# Patient Record
Sex: FEMALE | Race: BLACK OR AFRICAN AMERICAN | Employment: UNEMPLOYED | URBAN - METROPOLITAN AREA
[De-identification: names, ages, dates, MRNs, and addresses within clinical notes are randomized per-mention and may not be internally consistent; named-entity substitution may affect disease eponyms.]

---

## 2018-05-31 ENCOUNTER — TELEPHONE (OUTPATIENT)
Dept: OBGYN CLINIC | Facility: CLINIC | Age: 33
End: 2018-05-31

## 2018-07-24 ENCOUNTER — TELEPHONE (OUTPATIENT)
Dept: OBGYN CLINIC | Facility: CLINIC | Age: 33
End: 2018-07-24

## 2018-07-24 ENCOUNTER — OFFICE VISIT (OUTPATIENT)
Dept: OBGYN CLINIC | Facility: CLINIC | Age: 33
End: 2018-07-24
Payer: MEDICAID

## 2018-07-24 VITALS
BODY MASS INDEX: 36.82 KG/M2 | HEIGHT: 65 IN | WEIGHT: 221 LBS | TEMPERATURE: 98 F | SYSTOLIC BLOOD PRESSURE: 120 MMHG | DIASTOLIC BLOOD PRESSURE: 80 MMHG

## 2018-07-24 DIAGNOSIS — Z12.39 ENCOUNTER FOR SCREENING BREAST EXAMINATION: ICD-10-CM

## 2018-07-24 DIAGNOSIS — Z11.3 SCREEN FOR SEXUALLY TRANSMITTED DISEASES: ICD-10-CM

## 2018-07-24 DIAGNOSIS — L76.82 INCISIONAL PAIN: ICD-10-CM

## 2018-07-24 DIAGNOSIS — N84.0 UTERINE POLYP: ICD-10-CM

## 2018-07-24 DIAGNOSIS — Z12.4 SCREENING FOR MALIGNANT NEOPLASM OF CERVIX: ICD-10-CM

## 2018-07-24 DIAGNOSIS — N92.6 IRREGULAR MENSES: ICD-10-CM

## 2018-07-24 DIAGNOSIS — Z01.419 WELL WOMAN EXAM WITH ROUTINE GYNECOLOGICAL EXAM: Primary | ICD-10-CM

## 2018-07-24 PROCEDURE — 88175 CYTOPATH C/V AUTO FLUID REDO: CPT | Performed by: NURSE PRACTITIONER

## 2018-07-24 PROCEDURE — 87491 CHLMYD TRACH DNA AMP PROBE: CPT | Performed by: NURSE PRACTITIONER

## 2018-07-24 PROCEDURE — 87624 HPV HI-RISK TYP POOLED RSLT: CPT | Performed by: NURSE PRACTITIONER

## 2018-07-24 PROCEDURE — 99385 PREV VISIT NEW AGE 18-39: CPT | Performed by: NURSE PRACTITIONER

## 2018-07-24 PROCEDURE — 87591 N.GONORRHOEAE DNA AMP PROB: CPT | Performed by: NURSE PRACTITIONER

## 2018-07-24 NOTE — TELEPHONE ENCOUNTER
Submitted prior auth for transvaginal and pelvic ultrasounds     [id number] approved [id number]  [id number] approved [id number]    Please schedule ultrasound

## 2018-07-24 NOTE — PROGRESS NOTES
HPI:   Jared Turcios is a 34year old  who presents for an annual gynecological exam.   Patient has h/o 1  and 3 c-sections. Had uterine prolapse after last c/s with repair at hospital. (states OB MD \"pushed her uterus back in her body\").  Had tub heartburn  : denies dysuria, vaginal discharge or itching,   MUSCULOSKELETAL: denies back pain, muscle or joint aches  NEUROLOGIC: denies headaches  PSYCHIATRIC: denies depression, mood is good  HEMATOLOGIC: denies history of anemia  ENDOCRINE: denies th her diet to ensure adequate calcium intake, specifically with yogurt, milk, and cheeses.   - I encouraged pt to maintain taking a daily women's vitamin.  - I encouraged monthly self breast exams; pt was told to perform these in the shower; she was instructe

## 2018-07-25 LAB
C TRACH DNA SPEC QL NAA+PROBE: NEGATIVE
N GONORRHOEA DNA SPEC QL NAA+PROBE: NEGATIVE

## 2018-07-25 NOTE — TELEPHONE ENCOUNTER
Appt. Scheduled    Future Appointments  Date Time Provider Farhan Jeanie   8/1/2018 9:30 AM [facility] Yovani Villasenor

## 2018-07-31 LAB — HPV I/H RISK 1 DNA SPEC QL NAA+PROBE: NEGATIVE

## 2020-02-23 ENCOUNTER — APPOINTMENT (OUTPATIENT)
Dept: CT IMAGING | Age: 35
End: 2020-02-23
Attending: EMERGENCY MEDICINE

## 2020-02-23 ENCOUNTER — HOSPITAL ENCOUNTER (EMERGENCY)
Age: 35
Discharge: HOME | End: 2020-02-23
Attending: EMERGENCY MEDICINE

## 2020-02-23 VITALS
RESPIRATION RATE: 16 BRPM | DIASTOLIC BLOOD PRESSURE: 59 MMHG | HEIGHT: 66 IN | BODY MASS INDEX: 38.12 KG/M2 | OXYGEN SATURATION: 100 % | SYSTOLIC BLOOD PRESSURE: 113 MMHG | TEMPERATURE: 98.2 F | WEIGHT: 237.22 LBS | HEART RATE: 87 BPM

## 2020-02-23 DIAGNOSIS — N83.202 OVARIAN CYST, LEFT: ICD-10-CM

## 2020-02-23 DIAGNOSIS — R11.2 NON-INTRACTABLE VOMITING WITH NAUSEA, UNSPECIFIED VOMITING TYPE: ICD-10-CM

## 2020-02-23 DIAGNOSIS — R10.32 LEFT LOWER QUADRANT ABDOMINAL PAIN: Primary | ICD-10-CM

## 2020-02-23 LAB
ALBUMIN SERPL-MCNC: 3.5 G/DL (ref 3.6–5.1)
ALBUMIN/GLOB SERPL: 0.8 {RATIO} (ref 1–2.4)
ALP SERPL-CCNC: 107 UNITS/L (ref 45–117)
ALT SERPL-CCNC: 17 UNITS/L
ANION GAP SERPL CALC-SCNC: 9 MMOL/L (ref 10–20)
APPEARANCE UR: ABNORMAL
APPEARANCE UR: ABNORMAL
AST SERPL-CCNC: 10 UNITS/L
BASOPHILS # BLD AUTO: 0 K/MCL (ref 0–0.3)
BASOPHILS NFR BLD AUTO: 0 %
BILIRUB SERPL-MCNC: 0.3 MG/DL (ref 0.2–1)
BILIRUB UR QL STRIP: NEGATIVE
BILIRUB UR QL STRIP: NEGATIVE
BUN SERPL-MCNC: 6 MG/DL (ref 6–20)
BUN/CREAT SERPL: 11 (ref 7–25)
CALCIUM SERPL-MCNC: 9 MG/DL (ref 8.4–10.2)
CHLORIDE SERPL-SCNC: 105 MMOL/L (ref 98–107)
CO2 SERPL-SCNC: 26 MMOL/L (ref 21–32)
COLOR UR: YELLOW
COLOR UR: YELLOW
CREAT SERPL-MCNC: 0.56 MG/DL (ref 0.51–0.95)
DIFFERENTIAL METHOD BLD: ABNORMAL
EOSINOPHIL # BLD AUTO: 0 K/MCL (ref 0.1–0.5)
EOSINOPHIL NFR SPEC: 0 %
ERYTHROCYTE [DISTWIDTH] IN BLOOD: 17.7 % (ref 11–15)
GLOBULIN SER-MCNC: 4.3 G/DL (ref 2–4)
GLUCOSE SERPL-MCNC: 104 MG/DL (ref 65–99)
GLUCOSE UR STRIP-MCNC: NEGATIVE MG/DL
GLUCOSE UR STRIP-MCNC: NEGATIVE MG/DL
HCG UR QL: NEGATIVE
HCT VFR BLD CALC: 33.9 % (ref 36–46.5)
HGB BLD-MCNC: 9.9 G/DL (ref 12–15.5)
HGB UR QL STRIP: ABNORMAL
HGB UR QL STRIP: NEGATIVE
IMM GRANULOCYTES # BLD AUTO: 0.1 K/MCL (ref 0–0.2)
IMM GRANULOCYTES NFR BLD: 1 %
KETONES UR STRIP-MCNC: NEGATIVE MG/DL
KETONES UR STRIP-MCNC: NEGATIVE MG/DL
LEUKOCYTE ESTERASE UR QL STRIP: NEGATIVE
LEUKOCYTE ESTERASE UR QL STRIP: NEGATIVE
LIPASE SERPL-CCNC: 59 UNITS/L (ref 73–393)
LYMPHOCYTES # BLD MANUAL: 2.2 K/MCL (ref 1–4.8)
LYMPHOCYTES NFR BLD MANUAL: 15 %
MCH RBC QN AUTO: 21.8 PG (ref 26–34)
MCHC RBC AUTO-ENTMCNC: 29.2 G/DL (ref 32–36.5)
MCV RBC AUTO: 74.7 FL (ref 78–100)
MONOCYTES # BLD MANUAL: 0.5 K/MCL (ref 0.3–0.9)
MONOCYTES NFR BLD MANUAL: 4 %
NEUTROPHILS # BLD: 11.3 K/MCL (ref 1.8–7.7)
NEUTROPHILS NFR BLD AUTO: 80 %
NITRITE UR QL STRIP: NEGATIVE
NITRITE UR QL STRIP: NEGATIVE
NRBC BLD MANUAL-RTO: 0 /100 WBC
PH UR STRIP: 8 UNITS (ref 5–7)
PH UR STRIP: 8.5 UNITS (ref 5–7)
PLATELET # BLD: 403 K/MCL (ref 140–450)
POTASSIUM SERPL-SCNC: 3.7 MMOL/L (ref 3.4–5.1)
PROT SERPL-MCNC: 7.8 G/DL (ref 6.4–8.2)
PROT UR STRIP-MCNC: ABNORMAL MG/DL
PROT UR STRIP-MCNC: NEGATIVE MG/DL
RBC # BLD: 4.54 MIL/MCL (ref 4–5.2)
SODIUM SERPL-SCNC: 136 MMOL/L (ref 135–145)
SP GR UR STRIP: 1.02 (ref 1–1.03)
SP GR UR STRIP: >1.03 (ref 1–1.03)
SPECIMEN SOURCE: ABNORMAL
UROBILINOGEN UR STRIP-MCNC: 0.2 MG/DL (ref 0–1)
UROBILINOGEN UR STRIP-MCNC: 1 MG/DL (ref 0–1)
WBC # BLD: 14 K/MCL (ref 4.2–11)

## 2020-02-23 PROCEDURE — 83690 ASSAY OF LIPASE: CPT

## 2020-02-23 PROCEDURE — 74177 CT ABD & PELVIS W/CONTRAST: CPT

## 2020-02-23 PROCEDURE — 10002807 HB RX 258: Performed by: EMERGENCY MEDICINE

## 2020-02-23 PROCEDURE — 96375 TX/PRO/DX INJ NEW DRUG ADDON: CPT

## 2020-02-23 PROCEDURE — 96374 THER/PROPH/DIAG INJ IV PUSH: CPT

## 2020-02-23 PROCEDURE — 85025 COMPLETE CBC W/AUTO DIFF WBC: CPT

## 2020-02-23 PROCEDURE — 87491 CHLMYD TRACH DNA AMP PROBE: CPT

## 2020-02-23 PROCEDURE — 84703 CHORIONIC GONADOTROPIN ASSAY: CPT

## 2020-02-23 PROCEDURE — 99284 EMERGENCY DEPT VISIT MOD MDM: CPT

## 2020-02-23 PROCEDURE — 10002805 HB CONTRAST AGENT: Performed by: EMERGENCY MEDICINE

## 2020-02-23 PROCEDURE — 81003 URINALYSIS AUTO W/O SCOPE: CPT

## 2020-02-23 PROCEDURE — 10002800 HB RX 250 W HCPCS: Performed by: EMERGENCY MEDICINE

## 2020-02-23 PROCEDURE — 10002803 HB RX 637: Performed by: EMERGENCY MEDICINE

## 2020-02-23 PROCEDURE — 80053 COMPREHEN METABOLIC PANEL: CPT

## 2020-02-23 RX ORDER — KETOROLAC TROMETHAMINE 10 MG/1
10 TABLET, FILM COATED ORAL EVERY 6 HOURS PRN
Qty: 15 TABLET | Refills: 0 | Status: SHIPPED | OUTPATIENT
Start: 2020-02-23

## 2020-02-23 RX ORDER — HYOSCYAMINE SULFATE 0.12 MG/1
0.12 TABLET SUBLINGUAL 3 TIMES DAILY PRN
Qty: 20 TABLET | Refills: 1 | Status: SHIPPED | OUTPATIENT
Start: 2020-02-23

## 2020-02-23 RX ORDER — ONDANSETRON 8 MG/1
8 TABLET, ORALLY DISINTEGRATING ORAL EVERY 8 HOURS PRN
Qty: 12 TABLET | Refills: 1 | Status: SHIPPED | OUTPATIENT
Start: 2020-02-23

## 2020-02-23 RX ORDER — KETOROLAC TROMETHAMINE 10 MG/1
10 TABLET, FILM COATED ORAL EVERY 6 HOURS PRN
Qty: 15 TABLET | Refills: 0 | Status: SHIPPED | OUTPATIENT
Start: 2020-02-23 | End: 2020-02-23 | Stop reason: SDUPTHER

## 2020-02-23 RX ORDER — ONDANSETRON 2 MG/ML
4 INJECTION INTRAMUSCULAR; INTRAVENOUS ONCE
Status: COMPLETED | OUTPATIENT
Start: 2020-02-23 | End: 2020-02-23

## 2020-02-23 RX ADMIN — ONDANSETRON 4 MG: 2 INJECTION INTRAMUSCULAR; INTRAVENOUS at 21:45

## 2020-02-23 RX ADMIN — MORPHINE SULFATE 4 MG: 4 INJECTION INTRAVENOUS at 21:45

## 2020-02-23 RX ADMIN — IOHEXOL 80 ML: 350 INJECTION, SOLUTION INTRAVENOUS at 20:30

## 2020-02-23 RX ADMIN — HYOSCYAMINE SULFATE 0.12 MG: 0.12 TABLET SUBLINGUAL at 22:22

## 2020-02-23 RX ADMIN — KETOROLAC TROMETHAMINE 30 MG: 30 INJECTION, SOLUTION INTRAMUSCULAR; INTRAVENOUS at 21:45

## 2020-02-23 RX ADMIN — SODIUM CHLORIDE 1000 ML: 0.9 INJECTION, SOLUTION INTRAVENOUS at 21:46

## 2020-02-23 ASSESSMENT — PAIN DESCRIPTION - PAIN TYPE: TYPE: ACUTE PAIN

## 2020-02-23 ASSESSMENT — PAIN SCALES - GENERAL
PAINLEVEL_OUTOF10: 4
PAINLEVEL_OUTOF10: 10
PAINLEVEL_OUTOF10: 10

## 2020-02-24 LAB
C TRACH RRNA SPEC QL NAA+PROBE: NEGATIVE
N GONORRHOEA RRNA SPEC QL NAA+PROBE: NEGATIVE
SPECIMEN SOURCE: NORMAL

## 2020-03-08 ENCOUNTER — HOSPITAL ENCOUNTER (EMERGENCY)
Facility: HOSPITAL | Age: 35
Discharge: HOME | End: 2020-03-08
Attending: EMERGENCY MEDICINE
Payer: MEDICAID

## 2020-03-08 VITALS
HEART RATE: 85 BPM | RESPIRATION RATE: 16 BRPM | OXYGEN SATURATION: 100 % | BODY MASS INDEX: 37 KG/M2 | TEMPERATURE: 98 F | WEIGHT: 220.44 LBS | SYSTOLIC BLOOD PRESSURE: 135 MMHG | DIASTOLIC BLOOD PRESSURE: 83 MMHG

## 2020-03-08 DIAGNOSIS — J01.00 ACUTE MAXILLARY SINUSITIS, RECURRENCE NOT SPECIFIED: Primary | ICD-10-CM

## 2020-03-08 PROCEDURE — 99283 EMERGENCY DEPT VISIT LOW MDM: CPT

## 2020-03-08 RX ORDER — AZITHROMYCIN 250 MG/1
250 TABLET, FILM COATED ORAL DAILY
Qty: 1 PACKAGE | Refills: 0 | Status: SHIPPED | OUTPATIENT
Start: 2020-03-08 | End: 2021-03-25

## 2020-03-08 RX ORDER — AZITHROMYCIN 250 MG/1
500 TABLET, FILM COATED ORAL ONCE
Status: COMPLETED | OUTPATIENT
Start: 2020-03-08 | End: 2020-03-08

## 2020-03-09 NOTE — ED PROVIDER NOTES
Patient Seen in: [facility] Emergency Department      History   Patient presents with:  Cough/URI    Stated Complaint: c/o nasal congestion, \"think it's sinus infection. \" Denies cough, no GEE    HPI    72-year-old female with a history of depressi clinical history is consistent with acute sinusitis. Patient will be discharged home with a prescription for Zithromax. Recommend follow-up with her primary care physician. MDM     Patient was screened and evaluated during this visit.    As a treat

## 2021-04-28 ENCOUNTER — HOSPITAL ENCOUNTER (EMERGENCY)
Facility: HOSPITAL | Age: 36
Discharge: HOME | End: 2021-04-28
Attending: PEDIATRICS
Payer: MEDICAID

## 2021-04-28 VITALS
RESPIRATION RATE: 18 BRPM | BODY MASS INDEX: 40.14 KG/M2 | SYSTOLIC BLOOD PRESSURE: 145 MMHG | HEIGHT: 65 IN | WEIGHT: 240.94 LBS | DIASTOLIC BLOOD PRESSURE: 88 MMHG | HEART RATE: 105 BPM | OXYGEN SATURATION: 98 % | TEMPERATURE: 98 F

## 2021-04-28 DIAGNOSIS — B34.9 VIRAL SYNDROME: Primary | ICD-10-CM

## 2021-04-28 PROCEDURE — 99283 EMERGENCY DEPT VISIT LOW MDM: CPT

## 2021-04-29 NOTE — ED PROVIDER NOTES
Patient Seen in: [facility] Emergency Department      History   Patient presents with:  Cough/URI    Stated Complaint: sinus congestion    HPI/Subjective:   HPI    82-year-old female history of of hypertension and diabetes who is here with 3-day his 04/28/21 1955 145/88   Pulse 04/28/21 1955 105   Resp 04/28/21 1955 16   Temp 04/28/21 1955 98 °F (36.7 °C)   Temp src 04/28/21 1955 Temporal   SpO2 04/28/21 1955 98 %   O2 Device 04/28/21 1952 None (Room air)       Current:/88   Pulse 105   Temp 97. General: Skin is warm. Capillary Refill: Capillary refill takes less than 2 seconds. Coloration: Skin is not pale. Findings: No erythema or rash. Neurological:      General: No focal deficit present.       Mental Status: She is alert and o emergency department. Instructed to return to emergency department or contact PCP for persistent, recurrent, or worsening symptoms.                        Disposition and Plan     Clinical Impression:  Viral syndrome  (primary encounter diagnosis)     Dispo

## 2023-02-11 ENCOUNTER — HOSPITAL ENCOUNTER (EMERGENCY)
Facility: HOSPITAL | Age: 38
Discharge: HOME | End: 2023-02-12
Attending: EMERGENCY MEDICINE
Payer: MEDICAID

## 2023-02-11 ENCOUNTER — APPOINTMENT (OUTPATIENT)
Dept: CT IMAGING | Facility: HOSPITAL | Age: 38
End: 2023-02-11
Attending: EMERGENCY MEDICINE
Payer: MEDICAID

## 2023-02-11 DIAGNOSIS — M54.16 LUMBAR RADICULOPATHY: Primary | ICD-10-CM

## 2023-02-11 LAB
ALBUMIN SERPL-MCNC: 3.4 G/DL (ref 3.4–5)
ALBUMIN/GLOB SERPL: 0.8 {RATIO} (ref 1–2)
ALP LIVER SERPL-CCNC: 111 U/L
ALT SERPL-CCNC: 21 U/L
ANION GAP SERPL CALC-SCNC: 7 MMOL/L (ref 0–18)
AST SERPL-CCNC: 13 U/L (ref 15–37)
B-HCG UR QL: NEGATIVE
BASOPHILS # BLD AUTO: 0.03 X10(3) UL (ref 0–0.2)
BASOPHILS NFR BLD AUTO: 0.3 %
BILIRUB SERPL-MCNC: 0.2 MG/DL (ref 0.1–2)
BILIRUB UR QL STRIP.AUTO: NEGATIVE
BUN BLD-MCNC: 9 MG/DL (ref 7–18)
CALCIUM BLD-MCNC: 9 MG/DL (ref 8.5–10.1)
CHLORIDE SERPL-SCNC: 107 MMOL/L (ref 98–112)
CO2 SERPL-SCNC: 27 MMOL/L (ref 21–32)
COLOR UR AUTO: YELLOW
CREAT BLD-MCNC: 0.62 MG/DL
EOSINOPHIL # BLD AUTO: 0.19 X10(3) UL (ref 0–0.7)
EOSINOPHIL NFR BLD AUTO: 2 %
ERYTHROCYTE [DISTWIDTH] IN BLOOD BY AUTOMATED COUNT: 17.7 %
GFR SERPLBLD BASED ON 1.73 SQ M-ARVRAT: 119 ML/MIN/1.73M2 (ref 60–?)
GLOBULIN PLAS-MCNC: 4.1 G/DL (ref 2.8–4.4)
GLUCOSE BLD-MCNC: 113 MG/DL (ref 70–99)
GLUCOSE UR STRIP.AUTO-MCNC: NEGATIVE MG/DL
HCT VFR BLD AUTO: 31.7 %
HGB BLD-MCNC: 9.4 G/DL
IMM GRANULOCYTES # BLD AUTO: 0.02 X10(3) UL (ref 0–1)
IMM GRANULOCYTES NFR BLD: 0.2 %
KETONES UR STRIP.AUTO-MCNC: NEGATIVE MG/DL
LEUKOCYTE ESTERASE UR QL STRIP.AUTO: NEGATIVE
LIPASE SERPL-CCNC: 134 U/L (ref 73–393)
LIPASE SERPL-CCNC: 37 U/L (ref 13–75)
LYMPHOCYTES # BLD AUTO: 3.13 X10(3) UL (ref 1–4)
LYMPHOCYTES NFR BLD AUTO: 32.6 %
MCH RBC QN AUTO: 21.4 PG (ref 26–34)
MCHC RBC AUTO-ENTMCNC: 29.7 G/DL (ref 31–37)
MCV RBC AUTO: 72.2 FL
MONOCYTES # BLD AUTO: 0.51 X10(3) UL (ref 0.1–1)
MONOCYTES NFR BLD AUTO: 5.3 %
NEUTROPHILS # BLD AUTO: 5.71 X10 (3) UL (ref 1.5–7.7)
NEUTROPHILS # BLD AUTO: 5.71 X10(3) UL (ref 1.5–7.7)
NEUTROPHILS NFR BLD AUTO: 59.6 %
NITRITE UR QL STRIP.AUTO: NEGATIVE
OSMOLALITY SERPL CALC.SUM OF ELEC: 291 MOSM/KG (ref 275–295)
PH UR STRIP.AUTO: 5 [PH] (ref 5–8)
PLATELET # BLD AUTO: 367 10(3)UL (ref 150–450)
POTASSIUM SERPL-SCNC: 3.4 MMOL/L (ref 3.5–5.1)
PROT SERPL-MCNC: 7.5 G/DL (ref 6.4–8.2)
PROT UR STRIP.AUTO-MCNC: NEGATIVE MG/DL
RBC # BLD AUTO: 4.39 X10(6)UL
RBC UR QL AUTO: NEGATIVE
SARS-COV-2 RNA RESP QL NAA+PROBE: NOT DETECTED
SODIUM SERPL-SCNC: 141 MMOL/L (ref 136–145)
SP GR UR STRIP.AUTO: 1.02 (ref 1–1.03)
UROBILINOGEN UR STRIP.AUTO-MCNC: <2 MG/DL
WBC # BLD AUTO: 9.6 X10(3) UL (ref 4–11)

## 2023-02-11 PROCEDURE — 81025 URINE PREGNANCY TEST: CPT

## 2023-02-11 PROCEDURE — 99285 EMERGENCY DEPT VISIT HI MDM: CPT

## 2023-02-11 PROCEDURE — 96375 TX/PRO/DX INJ NEW DRUG ADDON: CPT

## 2023-02-11 PROCEDURE — 96361 HYDRATE IV INFUSION ADD-ON: CPT

## 2023-02-11 PROCEDURE — 81001 URINALYSIS AUTO W/SCOPE: CPT | Performed by: EMERGENCY MEDICINE

## 2023-02-11 PROCEDURE — 85025 COMPLETE CBC W/AUTO DIFF WBC: CPT | Performed by: EMERGENCY MEDICINE

## 2023-02-11 PROCEDURE — 96374 THER/PROPH/DIAG INJ IV PUSH: CPT

## 2023-02-11 PROCEDURE — 80053 COMPREHEN METABOLIC PANEL: CPT | Performed by: EMERGENCY MEDICINE

## 2023-02-11 PROCEDURE — 74177 CT ABD & PELVIS W/CONTRAST: CPT | Performed by: EMERGENCY MEDICINE

## 2023-02-11 PROCEDURE — 83690 ASSAY OF LIPASE: CPT | Performed by: EMERGENCY MEDICINE

## 2023-02-11 RX ORDER — METHYLPREDNISOLONE 4 MG/1
TABLET ORAL
Qty: 1 EACH | Refills: 0 | Status: SHIPPED | OUTPATIENT
Start: 2023-02-11 | End: 2023-02-11

## 2023-02-11 RX ORDER — OMEPRAZOLE 40 MG/1
40 CAPSULE, DELAYED RELEASE ORAL DAILY
Qty: 30 CAPSULE | Refills: 0 | Status: SHIPPED | OUTPATIENT
Start: 2023-02-11 | End: 2023-03-13

## 2023-02-11 RX ORDER — HYDROCODONE BITARTRATE AND ACETAMINOPHEN 5; 325 MG/1; MG/1
1 TABLET ORAL EVERY 6 HOURS PRN
Qty: 10 TABLET | Refills: 0 | Status: SHIPPED | OUTPATIENT
Start: 2023-02-11 | End: 2023-02-11

## 2023-02-11 RX ORDER — KETOROLAC TROMETHAMINE 15 MG/ML
15 INJECTION, SOLUTION INTRAMUSCULAR; INTRAVENOUS ONCE
Status: COMPLETED | OUTPATIENT
Start: 2023-02-11 | End: 2023-02-11

## 2023-02-11 RX ORDER — MORPHINE SULFATE 4 MG/ML
4 INJECTION, SOLUTION INTRAMUSCULAR; INTRAVENOUS ONCE
Status: COMPLETED | OUTPATIENT
Start: 2023-02-11 | End: 2023-02-11

## 2023-02-11 RX ORDER — HYDROCODONE BITARTRATE AND ACETAMINOPHEN 5; 325 MG/1; MG/1
1-2 TABLET ORAL EVERY 6 HOURS PRN
Qty: 10 TABLET | Refills: 0 | Status: SHIPPED | OUTPATIENT
Start: 2023-02-11 | End: 2023-02-16

## 2023-02-11 RX ORDER — ONDANSETRON 2 MG/ML
4 INJECTION INTRAMUSCULAR; INTRAVENOUS ONCE
Status: COMPLETED | OUTPATIENT
Start: 2023-02-11 | End: 2023-02-11

## 2023-02-12 VITALS
TEMPERATURE: 98 F | DIASTOLIC BLOOD PRESSURE: 96 MMHG | RESPIRATION RATE: 18 BRPM | BODY MASS INDEX: 38.32 KG/M2 | HEART RATE: 85 BPM | HEIGHT: 65 IN | OXYGEN SATURATION: 99 % | WEIGHT: 230 LBS | SYSTOLIC BLOOD PRESSURE: 154 MMHG

## 2023-02-12 NOTE — ED INITIAL ASSESSMENT (HPI)
Here for eval of abd pain that radiates from umbilicus to right flank. Denies urinary problems.  + nausea

## 2024-05-05 ENCOUNTER — HOSPITAL ENCOUNTER (EMERGENCY)
Facility: HOSPITAL | Age: 39
Discharge: HOME | End: 2024-05-05
Attending: EMERGENCY MEDICINE

## 2024-05-05 VITALS
BODY MASS INDEX: 35.32 KG/M2 | TEMPERATURE: 98 F | DIASTOLIC BLOOD PRESSURE: 83 MMHG | HEART RATE: 93 BPM | HEIGHT: 65 IN | SYSTOLIC BLOOD PRESSURE: 153 MMHG | RESPIRATION RATE: 18 BRPM | WEIGHT: 212 LBS | OXYGEN SATURATION: 99 %

## 2024-05-05 DIAGNOSIS — J98.01 BRONCHOSPASM: Primary | ICD-10-CM

## 2024-05-05 DIAGNOSIS — H66.002 LEFT ACUTE SUPPURATIVE OTITIS MEDIA: ICD-10-CM

## 2024-05-05 PROCEDURE — 94640 AIRWAY INHALATION TREATMENT: CPT

## 2024-05-05 PROCEDURE — 94799 UNLISTED PULMONARY SVC/PX: CPT

## 2024-05-05 PROCEDURE — 99284 EMERGENCY DEPT VISIT MOD MDM: CPT

## 2024-05-05 RX ORDER — CLARITHROMYCIN 500 MG/1
500 TABLET, COATED ORAL 2 TIMES DAILY
Qty: 20 TABLET | Refills: 0 | Status: SHIPPED | OUTPATIENT
Start: 2024-05-05 | End: 2024-05-15

## 2024-05-05 RX ORDER — IPRATROPIUM BROMIDE AND ALBUTEROL SULFATE 2.5; .5 MG/3ML; MG/3ML
3 SOLUTION RESPIRATORY (INHALATION) ONCE
Status: COMPLETED | OUTPATIENT
Start: 2024-05-05 | End: 2024-05-05

## 2024-05-05 RX ORDER — PREDNISONE 20 MG/1
20 TABLET ORAL 2 TIMES DAILY
Qty: 10 TABLET | Refills: 0 | Status: SHIPPED | OUTPATIENT
Start: 2024-05-05 | End: 2024-05-10

## 2024-05-05 RX ORDER — ALBUTEROL SULFATE 90 UG/1
2 AEROSOL, METERED RESPIRATORY (INHALATION) EVERY 4 HOURS PRN
Qty: 1 EACH | Refills: 0 | Status: SHIPPED | OUTPATIENT
Start: 2024-05-05 | End: 2024-06-04

## 2024-05-05 NOTE — DISCHARGE INSTRUCTIONS
Biaxin antibiotic.  This medication has a side effect of giving persons a metallic taste in the mouth.  This is not dangerous  Albuterol inhaler with spacer-2 puffs around-the-clock every 4-6 hours  Short course of steroid  You may continue over-the-counter cold and cough medications  Contact your primary care doctor or family doctor first thing in the morning to arrange follow-up this week  Rest and drink plenty of liquids

## 2024-05-05 NOTE — ED INITIAL ASSESSMENT (HPI)
Pt presents to ER cough, chest congestion, bilateral ear pain, weakness, no fever, yes diarrhea. Pt states symptoms not improving after 20 days. Pt is speaking clearly. Respirations equal and nonlabored. Pt is a&ox3.

## 2024-05-05 NOTE — ED PROVIDER NOTES
Patient Seen in: [facility] Emergency Department      History     Chief Complaint   Patient presents with    Cough/URI     Stated Complaint: cold x 20 days, has not been evaluated yet    Subjective:   HPI    Patient complains of cough, congestion, ear pain, and feeling generally weak.  Symptoms been going on now for more than 2 weeks.  She has not seen a doctor.  She is a non-smoker.  She states that she does have a history of wheezing when she gets sick.  She was taking Mucinex without relief of her symptoms.  She has an albuterol inhaler at home that she was using occasionally without spacer chamber.  No calf pain or swelling.  Yesterday, patient had a little discomfort in the ulnar aspect of the proximal forearm.  It is gone today.        Objective:   Past Medical History:    Anemia    Anxiety and depression    Essential hypertension    Gestational diabetes (HCC)    diet    Prediabetes    A1c 6.2    Sickle cell trait (HCC)              Past Surgical History:   Procedure Laterality Date    Anesth, section      x 3                Social History     Socioeconomic History    Marital status:    Tobacco Use    Smoking status: Some Days     Types: Cigarettes    Smokeless tobacco: Never    Tobacco comments:     3-4 cigs   Vaping Use    Vaping status: Never Used   Substance and Sexual Activity    Alcohol use: No    Drug use: No    Sexual activity: Yes     Partners: Male     Birth control/protection: Tubal Ligation   Other Topics Concern    Caffeine Concern No    Exercise Yes    Seat Belt Yes   Social History Narrative    Diet:  Healthy diet    Caffeine: 4 -5 coffee and tea    Exercise: occas              Review of Systems    Positive for stated complaint: cold x 20 days, has not been evaluated yet  Other systems are as noted in HPI.  Constitutional and vital signs reviewed.      All other systems reviewed and negative except as noted above.    Physical Exam     ED Triage Vitals [24 1806]   BP  153/83   Pulse 93   Resp 18   Temp 97.9 °F (36.6 °C)   Temp src Oral   SpO2 99 %   O2 Device None (Room air)       Current:/83   Pulse 93   Temp 97.9 °F (36.6 °C) (Oral)   Resp 18   Ht 165.1 cm (5' 5\")   Wt 96.2 kg   LMP 04/27/2024 (Exact Date)   SpO2 99%   BMI 35.28 kg/m²         Physical Exam  General: The patient is awake, alert, conversant.  She speaks with a nasal tone to voice.  She has an occasional wheezy sounding cough  Eyes: sclera white, conjunctiva pink and moist.  Lids and lashes are normal.  Throat: Posterior pharynx is mildly erythematous without exudate oromucosa lips are moist  Ears: Both TMs are dull with a streak of erythema noted on the left side.  Nose: Congested without purulent secretions or overlying sinus erythema  Lungs: There is prolonged expiration and wheeze noted.  No rhonchi  Heart: Normal S1 and S2, without murmur.    Extremities: Unremarkable.  Calves nonswollen, symmetric  Skin: Not particularly pale  Neurologic:  Mental status as above.  Patient moves all extremities with good strength and coordination.           ED Course   Labs Reviewed - No data to display                  MDM        Patient with cough and congestion now ongoing for more than 2 weeks.  Likely what started as a viral upper respiratory infection make conditions available for bacterial bronchitis or even early pneumonia.  She has definite bronchospasm on exam  Patient treated with DuoNeb.  She is given MDI and spacer chamber training.    I recommend:  Biaxin antibiotic.  This medication has a side effect of giving persons a metallic taste in the mouth.  This is not dangerous  Albuterol inhaler with spacer-2 puffs around-the-clock every 4-6 hours  Short course of steroid  You may continue over-the-counter cold and cough medications  Contact your primary care doctor or family doctor first thing in the morning to arrange follow-up this week  Rest and drink plenty of liquids                                     Medical Decision Making      Disposition and Plan     Clinical Impression:  1. Bronchospasm    2. Left acute suppurative otitis media         Disposition:  Discharge  5/5/2024  6:54 pm    Follow-up:  Leana Gardner MD  [address] [place] [address]  [address]  [facility] [address]  [phone]    Call in 1 day(s)            Medications Prescribed:  Current Discharge Medication List        START taking these medications    Details   clarithromycin 500 MG Oral Tab Take 1 tablet (500 mg total) by mouth 2 (two) times daily for 10 days.  Qty: 20 tablet, Refills: 0      albuterol 108 (90 Base) MCG/ACT Inhalation Aero Soln Inhale 2 puffs into the lungs every 4 (four) hours as needed for Wheezing.  Qty: 1 each, Refills: 0      predniSONE 20 MG Oral Tab Take 1 tablet (20 mg total) by mouth 2 (two) times daily for 5 days.  Qty: 10 tablet, Refills: 0

## 2024-08-09 ENCOUNTER — HOSPITAL ENCOUNTER (INPATIENT)
Facility: HOSPITAL | Age: 39
End: 2024-08-09
Attending: EMERGENCY MEDICINE | Admitting: HOSPITALIST
Payer: MEDICAID

## 2024-08-09 ENCOUNTER — HOSPITAL ENCOUNTER (INPATIENT)
Facility: HOSPITAL | Age: 39
LOS: 13 days | Discharge: HOME HEALTH | End: 2024-08-23
Attending: EMERGENCY MEDICINE | Admitting: HOSPITALIST
Payer: MEDICAID

## 2024-08-09 ENCOUNTER — APPOINTMENT (OUTPATIENT)
Dept: CT IMAGING | Facility: HOSPITAL | Age: 39
End: 2024-08-09
Attending: EMERGENCY MEDICINE
Payer: MEDICAID

## 2024-08-09 DIAGNOSIS — R51.9 ACUTE INTRACTABLE HEADACHE, UNSPECIFIED HEADACHE TYPE: Primary | ICD-10-CM

## 2024-08-09 LAB
ALBUMIN SERPL-MCNC: 4.8 G/DL (ref 3.2–4.8)
ALBUMIN/GLOB SERPL: 1.8 {RATIO} (ref 1–2)
ALP LIVER SERPL-CCNC: 111 U/L
ALT SERPL-CCNC: 14 U/L
ANION GAP SERPL CALC-SCNC: 6 MMOL/L (ref 0–18)
AST SERPL-CCNC: 13 U/L (ref ?–34)
BASOPHILS # BLD AUTO: 0.03 X10(3) UL (ref 0–0.2)
BASOPHILS NFR BLD AUTO: 0.2 %
BILIRUB SERPL-MCNC: 0.3 MG/DL (ref 0.3–1.2)
BUN BLD-MCNC: 9 MG/DL (ref 9–23)
CALCIUM BLD-MCNC: 9.5 MG/DL (ref 8.7–10.4)
CHLORIDE SERPL-SCNC: 109 MMOL/L (ref 98–112)
CO2 SERPL-SCNC: 22 MMOL/L (ref 21–32)
CREAT BLD-MCNC: 0.61 MG/DL
EGFRCR SERPLBLD CKD-EPI 2021: 118 ML/MIN/1.73M2 (ref 60–?)
EOSINOPHIL # BLD AUTO: 0.11 X10(3) UL (ref 0–0.7)
EOSINOPHIL NFR BLD AUTO: 0.8 %
ERYTHROCYTE [DISTWIDTH] IN BLOOD BY AUTOMATED COUNT: 18.2 %
GLOBULIN PLAS-MCNC: 2.7 G/DL (ref 2–3.5)
GLUCOSE BLD-MCNC: 163 MG/DL (ref 70–99)
HCG SERPL QL: NEGATIVE
HCT VFR BLD AUTO: 32.4 %
HGB BLD-MCNC: 9.7 G/DL
IMM GRANULOCYTES # BLD AUTO: 0.05 X10(3) UL (ref 0–1)
IMM GRANULOCYTES NFR BLD: 0.4 %
LYMPHOCYTES # BLD AUTO: 2.57 X10(3) UL (ref 1–4)
LYMPHOCYTES NFR BLD AUTO: 19.7 %
MCH RBC QN AUTO: 21.8 PG (ref 26–34)
MCHC RBC AUTO-ENTMCNC: 29.9 G/DL (ref 31–37)
MCV RBC AUTO: 72.8 FL
MONOCYTES # BLD AUTO: 0.58 X10(3) UL (ref 0.1–1)
MONOCYTES NFR BLD AUTO: 4.4 %
NEUTROPHILS # BLD AUTO: 9.71 X10 (3) UL (ref 1.5–7.7)
NEUTROPHILS # BLD AUTO: 9.71 X10(3) UL (ref 1.5–7.7)
NEUTROPHILS NFR BLD AUTO: 74.5 %
OSMOLALITY SERPL CALC.SUM OF ELEC: 286 MOSM/KG (ref 275–295)
PLATELET # BLD AUTO: 357 10(3)UL (ref 150–450)
POTASSIUM SERPL-SCNC: 3.2 MMOL/L (ref 3.5–5.1)
PROT SERPL-MCNC: 7.5 G/DL (ref 5.7–8.2)
RBC # BLD AUTO: 4.45 X10(6)UL
SARS-COV-2 RNA RESP QL NAA+PROBE: NOT DETECTED
SODIUM SERPL-SCNC: 137 MMOL/L (ref 136–145)
WBC # BLD AUTO: 13.1 X10(3) UL (ref 4–11)

## 2024-08-09 PROCEDURE — 70498 CT ANGIOGRAPHY NECK: CPT | Performed by: EMERGENCY MEDICINE

## 2024-08-09 PROCEDURE — 70496 CT ANGIOGRAPHY HEAD: CPT | Performed by: EMERGENCY MEDICINE

## 2024-08-09 RX ORDER — DIPHENHYDRAMINE HYDROCHLORIDE 50 MG/ML
25 INJECTION INTRAMUSCULAR; INTRAVENOUS ONCE
Status: COMPLETED | OUTPATIENT
Start: 2024-08-09 | End: 2024-08-09

## 2024-08-09 RX ORDER — MORPHINE SULFATE 4 MG/ML
4 INJECTION, SOLUTION INTRAMUSCULAR; INTRAVENOUS ONCE
Status: COMPLETED | OUTPATIENT
Start: 2024-08-09 | End: 2024-08-10

## 2024-08-09 RX ORDER — METOCLOPRAMIDE HYDROCHLORIDE 5 MG/ML
5 INJECTION INTRAMUSCULAR; INTRAVENOUS ONCE
Status: COMPLETED | OUTPATIENT
Start: 2024-08-09 | End: 2024-08-09

## 2024-08-10 ENCOUNTER — APPOINTMENT (OUTPATIENT)
Dept: INTERVENTIONAL RADIOLOGY/VASCULAR | Facility: HOSPITAL | Age: 39
End: 2024-08-10
Payer: MEDICAID

## 2024-08-10 ENCOUNTER — APPOINTMENT (OUTPATIENT)
Dept: CV DIAGNOSTICS | Facility: HOSPITAL | Age: 39
End: 2024-08-10
Attending: INTERNAL MEDICINE
Payer: MEDICAID

## 2024-08-10 ENCOUNTER — APPOINTMENT (OUTPATIENT)
Dept: MRI IMAGING | Facility: HOSPITAL | Age: 39
End: 2024-08-10
Attending: EMERGENCY MEDICINE
Payer: MEDICAID

## 2024-08-10 ENCOUNTER — APPOINTMENT (OUTPATIENT)
Facility: HOSPITAL | Age: 39
End: 2024-08-10
Attending: HOSPITALIST
Payer: MEDICAID

## 2024-08-10 ENCOUNTER — APPOINTMENT (OUTPATIENT)
Dept: CT IMAGING | Facility: HOSPITAL | Age: 39
End: 2024-08-10
Payer: MEDICAID

## 2024-08-10 PROBLEM — R51.9 ACUTE INTRACTABLE HEADACHE, UNSPECIFIED HEADACHE TYPE: Status: ACTIVE | Noted: 2024-08-10

## 2024-08-10 LAB
APTT PPP: 25.3 SECONDS (ref 23–36)
ATRIAL RATE: 93 BPM
BASE EXCESS BLD CALC-SCNC: -4 MMOL/L
BASOPHILS NFR CSF: 0 %
CA-I BLD-SCNC: 1.11 MMOL/L (ref 1.12–1.32)
CO2 BLD-SCNC: 21 MMOL/L (ref 22–32)
COUNT PERFORMED ON TUBE: 4
EOSINOPHIL NFR CSF: 0 %
EST. AVERAGE GLUCOSE BLD GHB EST-MCNC: 131 MG/DL (ref 68–126)
GLUCOSE BLD-MCNC: 116 MG/DL (ref 70–99)
GLUCOSE BLD-MCNC: 144 MG/DL (ref 70–99)
GLUCOSE BLD-MCNC: 88 MG/DL (ref 70–99)
GLUCOSE CSF-MCNC: 59 MG/DL (ref 40–70)
HBA1C MFR BLD: 6.2 % (ref ?–5.7)
HCG UR QL: NEGATIVE
HCO3 BLD-SCNC: 20.4 MEQ/L
HCT VFR BLD CALC: 26 %
INR BLD: 1.06 (ref 0.8–1.2)
ISTAT ACTIVATED CLOTTING TIME: 128 SECONDS (ref 74–137)
ISTAT ACTIVATED CLOTTING TIME: 177 SECONDS (ref 74–137)
ISTAT ACTIVATED CLOTTING TIME: 183 SECONDS (ref 74–137)
ISTAT ACTIVATED CLOTTING TIME: 207 SECONDS (ref 74–137)
ISTAT ACTIVATED CLOTTING TIME: 214 SECONDS (ref 74–137)
LYMPHOCYTES NFR CSF: 7 %
MONOS+MACROS NFR CSF: 9 %
MRSA DNA SPEC QL NAA+PROBE: NEGATIVE
NEUTROPHILS NFR CSF: 84 %
P AXIS: 46 DEGREES
P-R INTERVAL: 144 MS
PCO2 BLD: 32.9 MMHG
PH BLD: 7.4 [PH]
PO2 BLD: 196 MMHG
POTASSIUM BLD-SCNC: 2.9 MMOL/L (ref 3.6–5.1)
PROT PATTERN CSF ELPH-IMP: 365.8 MG/DL (ref 15–45)
PROTHROMBIN TIME: 13.8 SECONDS (ref 11.6–14.8)
Q-T INTERVAL: 384 MS
QRS DURATION: 84 MS
QTC CALCULATION (BEZET): 477 MS
R AXIS: 40 DEGREES
RBC CSF TUBE 1: NORMAL /MM3
RBC CSF: ABNORMAL /MM3 (ref ?–1)
SAO2 % BLD: 100 %
SODIUM BLD-SCNC: 142 MMOL/L (ref 136–145)
T AXIS: 43 DEGREES
TOTAL CELLS COUNTED FLD: 100
TOTAL VOLUME CSF: 3.5 ML
TROPONIN I SERPL HS-MCNC: <3 NG/L
VENTRICULAR RATE: 93 BPM
WBC # CSF: 507 /MM3 (ref 0–5)

## 2024-08-10 PROCEDURE — 70450 CT HEAD/BRAIN W/O DYE: CPT

## 2024-08-10 PROCEDURE — 99292 CRITICAL CARE ADDL 30 MIN: CPT | Performed by: INTERNAL MEDICINE

## 2024-08-10 PROCEDURE — B313YZZ FLUOROSCOPY OF RIGHT COMMON CAROTID ARTERY USING OTHER CONTRAST: ICD-10-PCS

## 2024-08-10 PROCEDURE — 99291 CRITICAL CARE FIRST HOUR: CPT | Performed by: INTERNAL MEDICINE

## 2024-08-10 PROCEDURE — 93306 TTE W/DOPPLER COMPLETE: CPT | Performed by: INTERNAL MEDICINE

## 2024-08-10 PROCEDURE — 70553 MRI BRAIN STEM W/O & W/DYE: CPT | Performed by: EMERGENCY MEDICINE

## 2024-08-10 PROCEDURE — B318YZZ FLUOROSCOPY OF BILATERAL INTERNAL CAROTID ARTERIES USING OTHER CONTRAST: ICD-10-PCS

## 2024-08-10 PROCEDURE — 03VK3DZ RESTRICTION OF RIGHT INTERNAL CAROTID ARTERY WITH INTRALUMINAL DEVICE, PERCUTANEOUS APPROACH: ICD-10-PCS

## 2024-08-10 PROCEDURE — B31DYZZ FLUOROSCOPY OF RIGHT VERTEBRAL ARTERY USING OTHER CONTRAST: ICD-10-PCS

## 2024-08-10 PROCEDURE — B31RYZZ FLUOROSCOPY OF INTRACRANIAL ARTERIES USING OTHER CONTRAST: ICD-10-PCS

## 2024-08-10 PROCEDURE — 03VG3DZ RESTRICTION OF INTRACRANIAL ARTERY WITH INTRALUMINAL DEVICE, PERCUTANEOUS APPROACH: ICD-10-PCS

## 2024-08-10 PROCEDURE — B31HYZZ FLUOROSCOPY OF RIGHT UPPER EXTREMITY ARTERIES USING OTHER CONTRAST: ICD-10-PCS

## 2024-08-10 PROCEDURE — 70544 MR ANGIOGRAPHY HEAD W/O DYE: CPT | Performed by: EMERGENCY MEDICINE

## 2024-08-10 RX ORDER — LIDOCAINE HYDROCHLORIDE 10 MG/ML
INJECTION, SOLUTION INFILTRATION; PERINEURAL
Status: DISCONTINUED
Start: 2024-08-10 | End: 2024-08-10 | Stop reason: WASHOUT

## 2024-08-10 RX ORDER — ASPIRIN 325 MG
325 TABLET, DELAYED RELEASE (ENTERIC COATED) ORAL DAILY
Status: DISCONTINUED | OUTPATIENT
Start: 2024-08-10 | End: 2024-08-10

## 2024-08-10 RX ORDER — ROCURONIUM BROMIDE 10 MG/ML
INJECTION, SOLUTION INTRAVENOUS AS NEEDED
Status: DISCONTINUED | OUTPATIENT
Start: 2024-08-10 | End: 2024-08-10 | Stop reason: SURG

## 2024-08-10 RX ORDER — MORPHINE SULFATE 2 MG/ML
2 INJECTION, SOLUTION INTRAMUSCULAR; INTRAVENOUS ONCE
Status: COMPLETED | OUTPATIENT
Start: 2024-08-10 | End: 2024-08-10

## 2024-08-10 RX ORDER — VANCOMYCIN HYDROCHLORIDE
15 EVERY 24 HOURS
Status: DISCONTINUED | OUTPATIENT
Start: 2024-08-10 | End: 2024-08-10 | Stop reason: SDUPTHER

## 2024-08-10 RX ORDER — METOCLOPRAMIDE HYDROCHLORIDE 5 MG/ML
INJECTION INTRAMUSCULAR; INTRAVENOUS AS NEEDED
Status: DISCONTINUED | OUTPATIENT
Start: 2024-08-10 | End: 2024-08-10 | Stop reason: SURG

## 2024-08-10 RX ORDER — ONDANSETRON 2 MG/ML
INJECTION INTRAMUSCULAR; INTRAVENOUS AS NEEDED
Status: DISCONTINUED | OUTPATIENT
Start: 2024-08-10 | End: 2024-08-10 | Stop reason: SURG

## 2024-08-10 RX ORDER — LEVETIRACETAM 500 MG/5ML
500 INJECTION, SOLUTION, CONCENTRATE INTRAVENOUS EVERY 12 HOURS
Status: DISCONTINUED | OUTPATIENT
Start: 2024-08-10 | End: 2024-08-12

## 2024-08-10 RX ORDER — LIDOCAINE HYDROCHLORIDE 10 MG/ML
INJECTION, SOLUTION EPIDURAL; INFILTRATION; INTRACAUDAL; PERINEURAL
Status: COMPLETED
Start: 2024-08-10 | End: 2024-08-10

## 2024-08-10 RX ORDER — LORAZEPAM 2 MG/ML
0.5 INJECTION INTRAMUSCULAR ONCE
Status: COMPLETED | OUTPATIENT
Start: 2024-08-10 | End: 2024-08-10

## 2024-08-10 RX ORDER — SODIUM CHLORIDE 9 MG/ML
INJECTION, SOLUTION INTRAVENOUS CONTINUOUS
Status: DISCONTINUED | OUTPATIENT
Start: 2024-08-10 | End: 2024-08-10

## 2024-08-10 RX ORDER — HYDROMORPHONE HYDROCHLORIDE 1 MG/ML
0.2 INJECTION, SOLUTION INTRAMUSCULAR; INTRAVENOUS; SUBCUTANEOUS EVERY 2 HOUR PRN
Status: DISCONTINUED | OUTPATIENT
Start: 2024-08-10 | End: 2024-08-11

## 2024-08-10 RX ORDER — VANCOMYCIN HYDROCHLORIDE
1500 EVERY 12 HOURS
Status: DISCONTINUED | OUTPATIENT
Start: 2024-08-10 | End: 2024-08-12

## 2024-08-10 RX ORDER — GADOTERATE MEGLUMINE 376.9 MG/ML
20 INJECTION INTRAVENOUS
Status: COMPLETED | OUTPATIENT
Start: 2024-08-10 | End: 2024-08-10

## 2024-08-10 RX ORDER — HEPARIN SODIUM 5000 [USP'U]/ML
INJECTION, SOLUTION INTRAVENOUS; SUBCUTANEOUS
Status: DISCONTINUED
Start: 2024-08-10 | End: 2024-08-10 | Stop reason: WASHOUT

## 2024-08-10 RX ORDER — NALOXONE HYDROCHLORIDE 0.4 MG/ML
0.08 INJECTION, SOLUTION INTRAMUSCULAR; INTRAVENOUS; SUBCUTANEOUS AS NEEDED
Status: DISCONTINUED | OUTPATIENT
Start: 2024-08-10 | End: 2024-08-11

## 2024-08-10 RX ORDER — ESMOLOL HYDROCHLORIDE 10 MG/ML
INJECTION INTRAVENOUS AS NEEDED
Status: DISCONTINUED | OUTPATIENT
Start: 2024-08-10 | End: 2024-08-10 | Stop reason: SURG

## 2024-08-10 RX ORDER — MORPHINE SULFATE 4 MG/ML
4 INJECTION, SOLUTION INTRAMUSCULAR; INTRAVENOUS ONCE
Status: COMPLETED | OUTPATIENT
Start: 2024-08-10 | End: 2024-08-10

## 2024-08-10 RX ORDER — LABETALOL HYDROCHLORIDE 5 MG/ML
20 INJECTION, SOLUTION INTRAVENOUS ONCE
Status: COMPLETED | OUTPATIENT
Start: 2024-08-10 | End: 2024-08-10

## 2024-08-10 RX ORDER — HEPARIN SODIUM 1000 [USP'U]/ML
INJECTION, SOLUTION INTRAVENOUS; SUBCUTANEOUS AS NEEDED
Status: DISCONTINUED | OUTPATIENT
Start: 2024-08-10 | End: 2024-08-10 | Stop reason: SURG

## 2024-08-10 RX ORDER — NIMODIPINE 30 MG/1
60 CAPSULE, LIQUID FILLED ORAL
Status: DISCONTINUED | OUTPATIENT
Start: 2024-08-10 | End: 2024-08-23

## 2024-08-10 RX ORDER — ACETAMINOPHEN 325 MG/1
650 TABLET ORAL EVERY 4 HOURS PRN
Status: DISCONTINUED | OUTPATIENT
Start: 2024-08-10 | End: 2024-08-12

## 2024-08-10 RX ORDER — ONDANSETRON 2 MG/ML
4 INJECTION INTRAMUSCULAR; INTRAVENOUS EVERY 4 HOURS PRN
Status: DISCONTINUED | OUTPATIENT
Start: 2024-08-10 | End: 2024-08-10

## 2024-08-10 RX ORDER — LABETALOL HYDROCHLORIDE 5 MG/ML
10 INJECTION, SOLUTION INTRAVENOUS EVERY 10 MIN PRN
Status: DISCONTINUED | OUTPATIENT
Start: 2024-08-10 | End: 2024-08-23

## 2024-08-10 RX ORDER — CEFAZOLIN SODIUM 1 G/3ML
INJECTION, POWDER, FOR SOLUTION INTRAMUSCULAR; INTRAVENOUS AS NEEDED
Status: DISCONTINUED | OUTPATIENT
Start: 2024-08-10 | End: 2024-08-10 | Stop reason: SURG

## 2024-08-10 RX ORDER — NITROGLYCERIN 20 MG/100ML
INJECTION INTRAVENOUS
Status: COMPLETED
Start: 2024-08-10 | End: 2024-08-10

## 2024-08-10 RX ORDER — MORPHINE SULFATE 2 MG/ML
1 INJECTION, SOLUTION INTRAMUSCULAR; INTRAVENOUS EVERY 2 HOUR PRN
Status: DISCONTINUED | OUTPATIENT
Start: 2024-08-10 | End: 2024-08-14

## 2024-08-10 RX ORDER — LABETALOL HYDROCHLORIDE 5 MG/ML
INJECTION, SOLUTION INTRAVENOUS AS NEEDED
Status: DISCONTINUED | OUTPATIENT
Start: 2024-08-10 | End: 2024-08-10 | Stop reason: SURG

## 2024-08-10 RX ORDER — HEPARIN SODIUM 5000 [USP'U]/ML
INJECTION, SOLUTION INTRAVENOUS; SUBCUTANEOUS
Status: COMPLETED
Start: 2024-08-10 | End: 2024-08-10

## 2024-08-10 RX ORDER — VERAPAMIL HYDROCHLORIDE 2.5 MG/ML
INJECTION, SOLUTION INTRAVENOUS
Status: COMPLETED
Start: 2024-08-10 | End: 2024-08-10

## 2024-08-10 RX ORDER — SODIUM CHLORIDE 9 MG/ML
125 INJECTION, SOLUTION INTRAVENOUS CONTINUOUS
Status: DISCONTINUED | OUTPATIENT
Start: 2024-08-10 | End: 2024-08-10

## 2024-08-10 RX ORDER — VANCOMYCIN 2 GRAM/500 ML IN 0.9 % SODIUM CHLORIDE INTRAVENOUS
2000 ONCE
Status: COMPLETED | OUTPATIENT
Start: 2024-08-10 | End: 2024-08-10

## 2024-08-10 RX ORDER — ASPIRIN 300 MG/1
300 SUPPOSITORY RECTAL ONCE
Status: COMPLETED | OUTPATIENT
Start: 2024-08-10 | End: 2024-08-10

## 2024-08-10 RX ORDER — HEPARIN SODIUM 1000 [USP'U]/ML
INJECTION, SOLUTION INTRAVENOUS; SUBCUTANEOUS
Status: COMPLETED
Start: 2024-08-10 | End: 2024-08-10

## 2024-08-10 RX ORDER — ASPIRIN 325 MG
325 TABLET, DELAYED RELEASE (ENTERIC COATED) ORAL ONCE
Status: COMPLETED | OUTPATIENT
Start: 2024-08-10 | End: 2024-08-10

## 2024-08-10 RX ORDER — LIDOCAINE/PRILOCAINE 2.5 %-2.5%
CREAM (GRAM) TOPICAL
Status: COMPLETED
Start: 2024-08-10 | End: 2024-08-10

## 2024-08-10 RX ORDER — ASPIRIN 300 MG/1
300 SUPPOSITORY RECTAL DAILY
Status: DISCONTINUED | OUTPATIENT
Start: 2024-08-10 | End: 2024-08-10

## 2024-08-10 RX ORDER — IODIXANOL 320 MG/ML
350 INJECTION, SOLUTION INTRAVASCULAR
Status: COMPLETED | OUTPATIENT
Start: 2024-08-10 | End: 2024-08-10

## 2024-08-10 RX ORDER — ONDANSETRON 2 MG/ML
4 INJECTION INTRAMUSCULAR; INTRAVENOUS EVERY 6 HOURS PRN
Status: DISCONTINUED | OUTPATIENT
Start: 2024-08-10 | End: 2024-08-23

## 2024-08-10 RX ORDER — MIDAZOLAM HYDROCHLORIDE 1 MG/ML
2 INJECTION INTRAMUSCULAR; INTRAVENOUS
Status: DISCONTINUED | OUTPATIENT
Start: 2024-08-10 | End: 2024-08-23

## 2024-08-10 RX ORDER — ACETAMINOPHEN 10 MG/ML
1000 INJECTION, SOLUTION INTRAVENOUS EVERY 6 HOURS PRN
Status: DISCONTINUED | OUTPATIENT
Start: 2024-08-10 | End: 2024-08-12

## 2024-08-10 RX ORDER — MIDAZOLAM HYDROCHLORIDE 1 MG/ML
INJECTION INTRAMUSCULAR; INTRAVENOUS
Status: DISCONTINUED
Start: 2024-08-10 | End: 2024-08-10 | Stop reason: WASHOUT

## 2024-08-10 RX ORDER — ACETAMINOPHEN 650 MG/1
650 SUPPOSITORY RECTAL EVERY 4 HOURS PRN
Status: DISCONTINUED | OUTPATIENT
Start: 2024-08-10 | End: 2024-08-12

## 2024-08-10 RX ORDER — HYDROMORPHONE HYDROCHLORIDE 1 MG/ML
0.4 INJECTION, SOLUTION INTRAMUSCULAR; INTRAVENOUS; SUBCUTANEOUS EVERY 2 HOUR PRN
Status: DISCONTINUED | OUTPATIENT
Start: 2024-08-10 | End: 2024-08-11

## 2024-08-10 RX ORDER — HYDRALAZINE HYDROCHLORIDE 20 MG/ML
10 INJECTION INTRAMUSCULAR; INTRAVENOUS EVERY 2 HOUR PRN
Status: DISCONTINUED | OUTPATIENT
Start: 2024-08-10 | End: 2024-08-23

## 2024-08-10 RX ORDER — ASPIRIN 81 MG/1
81 TABLET, CHEWABLE ORAL DAILY
Status: DISCONTINUED | OUTPATIENT
Start: 2024-08-11 | End: 2024-08-21

## 2024-08-10 RX ORDER — METOPROLOL TARTRATE 1 MG/ML
INJECTION, SOLUTION INTRAVENOUS AS NEEDED
Status: DISCONTINUED | OUTPATIENT
Start: 2024-08-10 | End: 2024-08-10 | Stop reason: SURG

## 2024-08-10 RX ORDER — ONDANSETRON 2 MG/ML
4 INJECTION INTRAMUSCULAR; INTRAVENOUS EVERY 6 HOURS PRN
Status: DISCONTINUED | OUTPATIENT
Start: 2024-08-10 | End: 2024-08-10

## 2024-08-10 RX ORDER — SODIUM CHLORIDE, SODIUM LACTATE, POTASSIUM CHLORIDE, CALCIUM CHLORIDE 600; 310; 30; 20 MG/100ML; MG/100ML; MG/100ML; MG/100ML
INJECTION, SOLUTION INTRAVENOUS CONTINUOUS
Status: DISCONTINUED | OUTPATIENT
Start: 2024-08-10 | End: 2024-08-14

## 2024-08-10 RX ORDER — HYDROMORPHONE HYDROCHLORIDE 1 MG/ML
0.8 INJECTION, SOLUTION INTRAMUSCULAR; INTRAVENOUS; SUBCUTANEOUS EVERY 2 HOUR PRN
Status: DISCONTINUED | OUTPATIENT
Start: 2024-08-10 | End: 2024-08-11

## 2024-08-10 RX ORDER — MORPHINE SULFATE 2 MG/ML
2 INJECTION, SOLUTION INTRAMUSCULAR; INTRAVENOUS EVERY 2 HOUR PRN
Status: DISCONTINUED | OUTPATIENT
Start: 2024-08-10 | End: 2024-08-14

## 2024-08-10 RX ORDER — MORPHINE SULFATE 4 MG/ML
INJECTION, SOLUTION INTRAMUSCULAR; INTRAVENOUS
Status: DISCONTINUED
Start: 2024-08-10 | End: 2024-08-10

## 2024-08-10 RX ORDER — METOCLOPRAMIDE HYDROCHLORIDE 5 MG/ML
10 INJECTION INTRAMUSCULAR; INTRAVENOUS EVERY 8 HOURS PRN
Status: DISCONTINUED | OUTPATIENT
Start: 2024-08-10 | End: 2024-08-23

## 2024-08-10 RX ADMIN — ROCURONIUM BROMIDE 10 MG: 10 INJECTION, SOLUTION INTRAVENOUS at 20:53:00

## 2024-08-10 RX ADMIN — HEPARIN SODIUM 2000 UNITS: 1000 INJECTION, SOLUTION INTRAVENOUS; SUBCUTANEOUS at 20:14:00

## 2024-08-10 RX ADMIN — SODIUM CHLORIDE: 9 INJECTION, SOLUTION INTRAVENOUS at 21:15:00

## 2024-08-10 RX ADMIN — METOCLOPRAMIDE HYDROCHLORIDE 10 MG: 5 INJECTION INTRAMUSCULAR; INTRAVENOUS at 21:30:00

## 2024-08-10 RX ADMIN — ROCURONIUM BROMIDE 10 MG: 10 INJECTION, SOLUTION INTRAVENOUS at 19:11:00

## 2024-08-10 RX ADMIN — ROCURONIUM BROMIDE 10 MG: 10 INJECTION, SOLUTION INTRAVENOUS at 19:44:00

## 2024-08-10 RX ADMIN — HEPARIN SODIUM 2 UNITS: 1000 INJECTION, SOLUTION INTRAVENOUS; SUBCUTANEOUS at 19:40:00

## 2024-08-10 RX ADMIN — ROCURONIUM BROMIDE 40 MG: 10 INJECTION, SOLUTION INTRAVENOUS at 17:44:00

## 2024-08-10 RX ADMIN — ROCURONIUM BROMIDE 10 MG: 10 INJECTION, SOLUTION INTRAVENOUS at 20:14:00

## 2024-08-10 RX ADMIN — ROCURONIUM BROMIDE 40 MG: 10 INJECTION, SOLUTION INTRAVENOUS at 18:25:00

## 2024-08-10 RX ADMIN — ESMOLOL HYDROCHLORIDE 70 MG: 10 INJECTION INTRAVENOUS at 17:33:00

## 2024-08-10 RX ADMIN — METOPROLOL TARTRATE 2.5 MG: 1 INJECTION, SOLUTION INTRAVENOUS at 17:37:00

## 2024-08-10 RX ADMIN — ONDANSETRON 4 MG: 2 INJECTION INTRAMUSCULAR; INTRAVENOUS at 21:30:00

## 2024-08-10 RX ADMIN — ROCURONIUM BROMIDE 10 MG: 10 INJECTION, SOLUTION INTRAVENOUS at 17:33:00

## 2024-08-10 RX ADMIN — LABETALOL HYDROCHLORIDE 10 MG: 5 INJECTION, SOLUTION INTRAVENOUS at 21:19:00

## 2024-08-10 RX ADMIN — HEPARIN SODIUM 2000 UNITS: 1000 INJECTION, SOLUTION INTRAVENOUS; SUBCUTANEOUS at 20:53:00

## 2024-08-10 RX ADMIN — CEFAZOLIN SODIUM 2 G: 1 INJECTION, POWDER, FOR SOLUTION INTRAMUSCULAR; INTRAVENOUS at 17:55:00

## 2024-08-10 NOTE — ED INITIAL ASSESSMENT (HPI)
Pt arrives via emd w c/o neck and back pain. Pt reports it started 20 min before ems arrived and \"it feels like hot oil is being poured down her back\". Ems gave pt 1100 mcg of fentanyl pta. Pt denies relief w meds. Pt also reporting headache and nausea in triage

## 2024-08-10 NOTE — CONSULTS
[facility]   part of [facility] Infectious Disease Consult    Greg Uriarte Patient Status:  Inpatient    1985 MRN [id number]   Location [facility] 6NE-A Attending Cheryle Martinez MD   Hosp Day # 0 PCP Leana Gardner MD     Reason for Consultation:  To help with infection and treatment,requested by Dr. 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 Aneurysm,   Also had abn LP, Started on IV abx,   Patient groggy most of the history taken from  at the bedside,   ID is asked to help with infection and treatment,       History:  Past Medical History:    Anemia    Anxiety and depression    Essential hypertension    Gestational diabetes (HCC)    diet    Prediabetes    A1c 6.2    Sickle cell trait (HCC)     Past Surgical History:   Procedure Laterality Date    Anesth, section      x 3     Family History   Problem Relation Age of Onset    Diabetes Mother     High Blood Pressure Mother     Other (heart murmur) Mother     Other (sarcoma) Brother     Other (neurofibromatosis) Brother     Depression Brother     Anxiety Brother       reports that she has been smoking cigarettes. She has never used smokeless tobacco. She reports that she does not drink alcohol and does not use drugs.    Allergies:  Allergies   Allergen Reactions    Amoxicillin RASH    Latex HIVES       Medications:    Current Facility-Administered Medications:     sodium chloride 0.9% infusion, 125 mL/hr,  Intravenous, Continuous    acyclovir (Zovirax) 600 mg in sodium chloride 0.9% 100 mL IVPB, 10 mg/kg (Ideal), Intravenous, Q8H    sodium chloride 0.9% infusion, , Intravenous, Continuous    labetalol (Trandate) 5 mg/mL injection 10 mg, 10 mg, Intravenous, Q10 Min PRN    hydrALAzine (Apresoline) 20 mg/mL injection 10 mg, 10 mg, Intravenous, Q2H PRN    acetaminophen (Tylenol) tab 650 mg, 650 mg, Oral, Q4H PRN **OR** acetaminophen (Tylenol) rectal suppository 650 mg, 650 mg, Rectal, Q4H PRN    ondansetron (Zofran) 4 MG/2ML injection 4 mg, 4 mg, Intravenous, Q6H PRN **OR** metoclopramide (Reglan) 5 mg/mL injection 10 mg, 10 mg, Intravenous, Q8H PRN    levETIRAcetam (Keppra) 500 mg/5mL injection 500 mg, 500 mg, Intravenous, q12h    niMODipine (Nimotop) cap 60 mg, 60 mg, Oral, 6 times per day    midazolam (Versed) 2 MG/2ML injection 2 mg, 2 mg, Intravenous, Q15 Min PRN    HYDROmorphone (Dilaudid) 1 MG/ML injection 0.2 mg, 0.2 mg, Intravenous, Q2H PRN **OR** HYDROmorphone (Dilaudid) 1 MG/ML injection 0.4 mg, 0.4 mg, Intravenous, Q2H PRN **OR** HYDROmorphone (Dilaudid) 1 MG/ML injection 0.8 mg, 0.8 mg, Intravenous, Q2H PRN    acetaminophen (Ofirmev) 10 mg/mL infusion premix 1,000 mg, 1,000 mg, Intravenous, Q6H PRN    cefTRIAXone (Rocephin) 2 g in sodium chloride 0.9% 100 mL IVPB-ADDV, 2 g, Intravenous, Q24H    vancomycin (Vancocin) 1.5 g in sodium chloride 0.9% 250mL IVPB premix, 15 mg/kg, Intravenous, Q24H    Review of Systems:   Constitutional: Negative for anorexia, chills, fatigue, fevers, malaise, night sweats and weight loss.  Eyes: Negative for visual disturbance, irritation and redness.  Ears, nose, mouth, throat, and face: Negative for hearing loss, tinnitus, nasal congestion, snoring, sore throat, hoarseness and voice change.  Respiratory: Negative for cough, sputum, hemoptysis, chest pain, wheezing, dyspnea on exertion, or stridor.  Cardiovascular: Negative for chest pain, palpitations, irregular heart beats,  syncope, fatigue, orthopnea, paroxysmal nocturnal dyspnea, lower extremity edema.  Gastrointestinal: Negative for dysphagia, odynophagia, reflux symptoms, nausea, vomiting, change in bowel habits, diarrhea, constipation and abdominal pain.  Integument/breast: Negative for rash, skin lesions, and pruritus.  Hematologic/lymphatic: Negative for easy bruising, bleeding, and lymphadenopathy.  Musculoskeletal: Negative for myalgias, arthralgias, muscle weakness.  Neurological: Negative for headaches, dizziness, seizures, memory problems, trouble swallowing, speech problems, gait problems and weakness.  Behavioral/Psych: Negative for active tobacco use.  Endocrine: No history of of diabetes, thyroid disorder.  Unable to obtain,     Vital signs in last 24 hours:  Patient Vitals for the past 24 hrs:   BP Temp Temp src Pulse Resp SpO2 Weight   08/10/24 1130 146/76 -- -- 78 18 -- --   08/10/24 1100 (!) 142/91 -- -- 98 21 -- --   08/10/24 1030 140/79 -- -- 80 11 -- --   08/10/24 1015 133/81 -- -- 74 15 -- --   08/10/24 1000 135/86 -- -- 74 20 -- --   08/10/24 0945 144/82 -- -- 81 14 -- --   08/10/24 0935 150/87 98.1 °F (36.7 °C) Temporal 82 14 95 % --   08/10/24 0834 135/73 -- -- 83 16 99 % --   08/10/24 0814 126/70 -- -- 95 16 -- --   08/10/24 0804 146/76 -- -- -- -- -- --   08/10/24 0517 141/77 -- -- 106 19 100 % --   08/09/24 2355 151/85 -- -- 89 19 98 % --   08/09/24 2148 128/78 -- -- -- -- -- --   08/09/24 2054 120/75 -- -- -- -- -- --   08/09/24 2053 -- 97 °F (36.1 °C) Temporal 101 16 100 % 213 lb 13.5 oz (97 kg)       Physical Exam:   General: alert, cooperative, oriented.  No respiratory distress.   Head: Normocephalic, without obvious abnormality, atraumatic.   Eyes: Conjunctivae/corneas clear.  No scleral icterus.  No conjunctival     hemorrhage.   Nose: Nares normal.   Throat: Lips, mucosa, and tongue normal.  No thrush noted.   Neck: painful movement of neck,trachea midline, no adenopathy, no thyromegaly.   Lungs:  CTAB, normal and equal bilateral chest rise   Chest wall: No tenderness or deformity.   Heart: Regular rate and rhythm, normal S1S2, no murmur.   Abdomen: soft, non-tender, non-distended, no masses, no guarding, no     rebound, positive BS.   Extremity: no edema, no cyanosis   Skin: No rashes or lesions.   Neurological: Alert, interactive, no focal deficits    Labs:  Lab Results   Component Value Date    WBC 13.1 08/09/2024    HGB 9.7 08/09/2024    HCT 32.4 08/09/2024    .0 08/09/2024    CREATSERUM 0.61 08/09/2024    BUN 9 08/09/2024     08/09/2024    K 3.2 08/09/2024     08/09/2024    CO2 22.0 08/09/2024     08/09/2024    CA 9.5 08/09/2024    ALB 4.8 08/09/2024    ALKPHO 111 08/09/2024    BILT 0.3 08/09/2024    TP 7.5 08/09/2024    AST 13 08/09/2024    ALT 14 08/09/2024       Radiology:  Reviewed,       Cultures:  Reviewed,     Assessment and Plan:    1.  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 Will follow IR input,   - Continue IV Vanc, Ceftriaxone and Acyclovir, 8/10.   - supportive care of headache, neuro checks,     2.    Leukocytosis; sec to above,     3.   Disposition: in house an middle aged female with severe headache of a day duration with no fever, chills. Bloody LP, imaging with possible aneurysm,   - Follow IR, NS input,   - Follow Pending cul,   - Continue IV Vanc, Ceftriaxone and Acyclovir,   - Pain management per PCP,     Discussed with spouse, RN, all questions answered, further recommendations to follow, Thanks,   Thank you for consulting [facility] for Greg Uriarte.  If you have  any questions or concerns please call [facility] Infectious Disease at [phone].     Magdalene Gurrola MD  8/10/2024  12:36 PM

## 2024-08-10 NOTE — ANESTHESIA PREPROCEDURE EVALUATION
PRE-OP EVALUATION    Patient Name: Greg Uriarte    Admit Diagnosis: Acute intractable headache, unspecified headache type [R51.9]    Pre-op Diagnosis: * No pre-op diagnosis entered *        Anesthesia Procedure: IR ANGIOGRAM    * No surgeons found in log *    Pre-op vitals reviewed.  Temp: 97.8 °F (36.6 °C)  Pulse: 78  Resp: 19  BP: 139/83  SpO2: 93 %  Body mass index is 35.59 kg/m².    Current medications reviewed.  Hospital Medications:   [COMPLETED] morphINE PF 2 MG/ML injection 2 mg  2 mg Intravenous Once    [COMPLETED] gadoterate meglumine (Dotarem) 10 MMOL/20ML injection 20 mL  20 mL Intravenous ONCE PRN    sodium chloride 0.9% infusion  125 mL/hr Intravenous Continuous    [COMPLETED] morphINE PF 4 MG/ML injection 4 mg  4 mg Intravenous Once    [] ondansetron (Zofran) 4 MG/2ML injection 4 mg  4 mg Intravenous Q4H PRN    [COMPLETED] cefTRIAXone (Rocephin) 2 g in sodium chloride 0.9% 100 mL IVPB-ADDV  2 g Intravenous Once    [COMPLETED] morphINE PF 4 MG/ML injection 4 mg  4 mg Intravenous Once    [COMPLETED] vancomycin (Vancocin) 2 g in sodium chloride 0.9% 500mL IVPB premix  2,000 mg Intravenous Once    acyclovir (Zovirax) 600 mg in sodium chloride 0.9% 100 mL IVPB  10 mg/kg (Ideal) Intravenous Q8H    [COMPLETED] labetalol (Trandate) 5 mg/mL injection 20 mg  20 mg Intravenous Once    sodium chloride 0.9% infusion   Intravenous Continuous    labetalol (Trandate) 5 mg/mL injection 10 mg  10 mg Intravenous Q10 Min PRN    hydrALAzine (Apresoline) 20 mg/mL injection 10 mg  10 mg Intravenous Q2H PRN    acetaminophen (Tylenol) tab 650 mg  650 mg Oral Q4H PRN    Or    acetaminophen (Tylenol) rectal suppository 650 mg  650 mg Rectal Q4H PRN    ondansetron (Zofran) 4 MG/2ML injection 4 mg  4 mg Intravenous Q6H PRN    Or    metoclopramide (Reglan) 5 mg/mL injection 10 mg  10 mg Intravenous Q8H PRN    levETIRAcetam (Keppra) 500 mg/5mL injection 500 mg  500 mg Intravenous q12h    niMODipine (Nimotop) cap 60 mg  60  mg Oral 6 times per day    midazolam (Versed) 2 MG/2ML injection 2 mg  2 mg Intravenous Q15 Min PRN    HYDROmorphone (Dilaudid) 1 MG/ML injection 0.2 mg  0.2 mg Intravenous Q2H PRN    Or    HYDROmorphone (Dilaudid) 1 MG/ML injection 0.4 mg  0.4 mg Intravenous Q2H PRN    Or    HYDROmorphone (Dilaudid) 1 MG/ML injection 0.8 mg  0.8 mg Intravenous Q2H PRN    acetaminophen (Ofirmev) 10 mg/mL infusion premix 1,000 mg  1,000 mg Intravenous Q6H PRN    cefTRIAXone (Rocephin) 2 g in sodium chloride 0.9% 100 mL IVPB-ADDV  2 g Intravenous q12h    [COMPLETED] Perflutren Lipid Microsphere (DEFINITY) 6.52 MG/ML injection 1.5 mL  1.5 mL Intravenous ONCE PRN    Vancomycin: PHARMACY DOSING  1 each Intravenous See Admin Instructions (RX holding)    [COMPLETED] LORazepam (Ativan) 2 mg/mL injection 0.5 mg  0.5 mg Intravenous Once    vancomycin (Vancocin) 1.5 g in sodium chloride 0.9% 250mL IVPB premix  1,500 mg Intravenous Q12H    [COMPLETED] lidocaine PF (Xylocaine-MPF) 1 % injection        [COMPLETED] heparin (Porcine) 1000 UNIT/ML injection        [COMPLETED] heparin (Porcine) 5000 UNIT/ML injection        [COMPLETED] heparin (Porcine) 5000 UNIT/ML injection        [COMPLETED] lidocaine (Xylocaine) 1 % injection        [COMPLETED] fentaNYL (Sublimaze) 50 mcg/mL injection        [COMPLETED] midazolam (Versed) 2 MG/2ML injection        [COMPLETED] niCARdipine in sodium chloride 0.86% (carDENE) 20 mg/200mL infusion premix        [COMPLETED] diphenhydrAMINE (Benadryl) 50 mg/mL  injection 25 mg  25 mg Intravenous Once    [COMPLETED] metoclopramide (Reglan) 5 mg/mL injection 5 mg  5 mg Intravenous Once    [COMPLETED] iopamidol 76% (ISOVUE-370) injection for power injector  75 mL Intravenous ONCE PRN    [COMPLETED] morphINE PF 4 MG/ML injection 4 mg  4 mg Intravenous Once       Outpatient Medications:     Medications Prior to Admission   Medication Sig Dispense Refill Last Dose    [] clarithromycin 500 MG Oral Tab Take 1 tablet (500  mg total) by mouth 2 (two) times daily for 10 days. 20 tablet 0     [] albuterol 108 (90 Base) MCG/ACT Inhalation Aero Soln Inhale 2 puffs into the lungs every 4 (four) hours as needed for Wheezing. 1 each 0     tranexamic acid 650 MG Oral Tab Take 2 tablets (1,300 mg total) by mouth in the morning, at noon, and at bedtime. (Patient not taking: Reported on 2024) 90 tablet 3     sertraline 50 MG Oral Tab Take 1 tablet (50 mg total) by mouth daily. (Patient not taking: Reported on 2024) 30 tablet 2        Allergies: Amoxicillin, Latex, and Banana      Anesthesia Evaluation    Patient summary reviewed.    Anesthetic Complications  (-) history of anesthetic complications         GI/Hepatic/Renal    Negative GI/hepatic/renal ROS.                             Cardiovascular                (+) obesity  (+) hypertension                                     Endo/Other  Comment: Sickle cell trait    (+) diabetes  gestational,       (+) anemia                   Pulmonary    Negative pulmonary ROS.                       Neuro/Psych    Negative neuro/psych ROS.                          Acute intractable headache, unspecified headache type Essential hypertension  Gestational diabetes (HCC) Incisional pain  Irregular menses Prediabetes  Sickle cell trait (HCC) Well woman exam with routine gynecological exam            Procedure Laterality Date    Anesth, section      x 3     Social History     Socioeconomic History    Marital status:    Tobacco Use    Smoking status: Some Days     Types: Cigarettes    Smokeless tobacco: Never    Tobacco comments:     3-4 cigs   Vaping Use    Vaping status: Never Used   Substance and Sexual Activity    Alcohol use: No    Drug use: No    Sexual activity: Yes     Partners: Male     Birth control/protection: Tubal Ligation   Other Topics Concern    Caffeine Concern No    Exercise Yes    Seat Belt Yes     History   Drug Use No     Available pre-op labs reviewed.  Lab  Results   Component Value Date    WBC 13.1 (H) 08/09/2024    RBC 4.45 08/09/2024    HGB 9.7 (L) 08/09/2024    HCT 32.4 (L) 08/09/2024    MCV 72.8 (L) 08/09/2024    MCH 21.8 (L) 08/09/2024    MCHC 29.9 (L) 08/09/2024    RDW 18.2 08/09/2024    .0 08/09/2024     Lab Results   Component Value Date     08/09/2024    K 3.2 (L) 08/09/2024     08/09/2024    CO2 22.0 08/09/2024    BUN 9 08/09/2024    CREATSERUM 0.61 08/09/2024     (H) 08/09/2024    CA 9.5 08/09/2024            Airway      Mallampati: III  Mouth opening: 3 FB  TM distance: 4 - 6 cm  Neck ROM: full Cardiovascular    Cardiovascular exam normal.         Dental  Comment: Normal wear/minor imperfections and discoloration noted. No grossly weakened or damaged teeth on exam.           Pulmonary    Pulmonary exam normal.                 Other findings        ASA: 2 and emergent  Plan: general  NPO status verified and patient meets guidelines.        Comment: We discussed general anesthesia as the primary form of anesthesia for this procedure. General anesthesia involves the use a breathing tube to help the patient breathe and deliver anesthetic gasses. Several intravenous medications will be used to help keep the patient safe and comfortable.  Common risks with general anesthesia include nausea, vomiting, scratched eye, sore throat and dental damage. The risk of dental damage is higher for weakened or damaged teeth. Rare but serious risks can occur. Some of these serious complications include brain injury, nerve injury, blindness, and death. Risks of these serious injury are small, but never zero. I asked the patient if they had any questions about the consent form and what was written on it. The patient was given the opportunity to ask questions in the pre op area and in the operating room before going to sleep. All questions were answered.  We also discussed mily and possible post op intubation.                Present on  Admission:  **None**

## 2024-08-10 NOTE — CONSULTS
[facility]  Interventional Neuroradiology Consult Note      Date of Service: 8/10/2024     History of Present Illness: Greg Uriarte i is a 39 year old female with past medical history of active smoking, hypertension, pre-diabetes, anxiety/depression, sickle cell trait, and family history of neurofibromatosis presented to the [facility] ER in the late evening of 8/9/2024 after acute onset of severe occipital headache and pressure radiating to the vertex with associated nausea/vomiting and photophobia.  She describes her headache as atypical from her occasional migraine headaches without improvement overnight in the emergency room.  Although no definitive intracranial hemorrhage was identified on initial CT head study, there appears to be mild interval ventriculomegaly in comparison to prior CT head study in 2016.  Subsequent CTA head/neck and MRI brain imaging studies identified a 4 mm cerebral aneurysm arising from the supraclinoid right internal carotid artery terminus projecting posteriorly as well punctate foci of T1 hyperintensity or hemorrhage in the left temporal lobe, but no definitive subarachnoid hemorrhage adjacent to the aneurysm in the suprasellar cistern, as further evaluated today by MRA/MR vessel wall imaging.  Lumbar puncture was performed in the ER with CSF analysis demonstrating a compromised bloody tap with > 50,000 RBCs in the 4th tube (non-diagnostic for SAH), elevated WBCs, and markedly elevated protein.  Due to inability to entirely exclude sentinel headache or occult subarachnoid hemorrhage form ruptured cerebral aneurysm with equivocal clinical and imaging criteria, our neurointerventional surgery service was consulted for emergent endovascular treatment by the neurocritical care and neurosurgery services.    Review of Systems:  +persisting headache, partial relief with tylenol  + nausea, photophobia  The patient denies cranial nerve symptoms such as blurred vision, diplopia,  ptosis, facial weakness/paresthesias, hearing loss, vertigo, tinnitus, hoarseness, dysphagia, or alternations in taste.  The patient denies upper/lower extremity weakness or paresthesias.  The patient denies dizziness, imbalance, falls, difficulty with ambulation, coordination, dysarthria, or speech expression/comprehension.     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     Family History:  Family History   Problem Relation Age of Onset    Diabetes Mother     High Blood Pressure Mother     Other (heart murmur) Mother     Sarcoma Brother     Neurofibromatosis ? NF-1 Brother     Depression Brother     Anxiety Brother    No family history of intracranial/aortic aneurysms, connective tissue or collagen vascular diseases    Medications:   acyclovir  10 mg/kg (Ideal) Intravenous Q8H    levETIRAcetam  500 mg Intravenous q12h    niMODipine  60 mg Oral 6 times per day    cefTRIAXone  2 g Intravenous q12h    Vancomycin IV  1 each Intravenous See Admin Instructions (RX holding)    vancomycin  1,500 mg Intravenous Q12H     Allergies:   Allergen Reactions    Amoxicillin RASH    Latex HIVES    Banana ITCHING     Physical Exam:  /87   Pulse 88   Temp 97.8 °F (36.6 °C) (Temporal)   Resp 15   Wt 213 lb 13.5 oz (97 kg)   LMP 2024 (Exact Date)   SpO2 99%   BMI 35.59 kg/m²   GENERAL:  The patient was pleasant and cooperative throughout the examination.    HEENT/NEURO:  The patient's head is normocephalic with anicteric sclerae, moist mucous membranes, and nasal cavities.  There is no evidence of carotid  bruits, lymphadenopathy, or masses. The thyroid is midline.  The patient is  alert, awake, and oriented with normal intelligence, memory, language, and judgement.  On cranial nerve examination, full visual fields are noted by confrontational testing.  The patient's pupils are equally responsive and reactive to light.  Extraocular muscles are intact.  There is no evidence of afferent pupillary defect, anisocoria, myosis, or ptosis.  Facial expression and sensation are symmetric and intact bilaterally in the V1-V3 distributions.  The patient's hearing is grossly intact.  The palate elevates symmetrically and the voice is normal.  Sternocleidomastoid and trapezius muscle strength are intact.  The tongue demonstrates protrusion in the midline.  The patient's strength and sensation are intact in bilateral upper and lower extremities. There is no pronator drift on [place] testing.  Romberg testing is negative.  There is no evidence of nystagmus, ataxia, dysarthria, or dysmetria with finger-to-nose testing.      Assessment/Plan:  Greg Uriarte is a right handed 39 year old female with multiple cerebrovascular risk factors of smoking, hypertension, pre-diabetes, sickle cell trait, and family history of neurofibromatosis presents with acute onset of severe atypical headache without resolution since yesterday evening.  Subsequent CTA head/neck and MRI brain imaging identified 4 mm supraclinoid right ICA terminus aneurysm, punctate intracranial hemorrhage foci in the left temporal lobe, and interval development of mild ventriculomegaly/possibly early hydrocephalus.  Since lumbar puncture was compromised with a bloody tap, occult subarachnoid hemorrhage or sentinel headache related to ruptured or symptomatic cerebral aneurysm cannot be entirely excluded. We counseled the patient that it would be prudent to urgently treat this high risk cerebral aneurysm in a patient with several risk factors for aneurysm rupture such as  hypertension and smoking, since we cannot exclude aneurysm rupture or symptomatic instability.     NeuroICU monitoring / Neurochecks q1h  Vasospasm prophylaxis: nimodiopine, euvolemia, daily TCDs  EVD deferred by Neurosurgery, mild ventriculomegaly    Cerebral angiography for  endovascular coil embolization with ballloon/stent-assisted techniques versus microsurgical treatment planning  Patient consent obtained after extensive discussion of the indications, benefits, risks/complications, and alternatives.  General endotracheal anesthesia  Antibiotic prophylaxis  Arterial line monitoring with SBP goals 110-150 mm Hg        Lan Collins MD, PhD  Department of Radiology, Neurology, and Neurological Surgery  [facility] [place]    8/10/2024 5:28 PM

## 2024-08-10 NOTE — ANESTHESIA PROCEDURE NOTES
Airway  Date/Time: 8/10/2024 5:36 PM  Urgency: elective    Airway not difficult    General Information and Staff    Patient location during procedure: OR  Anesthesiologist: Abran Chavez MD  Performed: anesthesiologist   Performed by: Abran Chavez MD  Authorized by: Abran Chavez MD      Indications and Patient Condition  Indications for airway management: anesthesia  Sedation level: deep  Preoxygenated: yes  Patient position: sniffing  Mask difficulty assessment: 1 - vent by mask    Final Airway Details  Final airway type: endotracheal airway      Successful airway: ETT  Cuffed: yes   Successful intubation technique: direct laryngoscopy  Facilitating devices/methods: rapid sequence intubation, intubating stylet and cricoid pressure  Endotracheal tube insertion site: oral  Blade: GlideScope  Blade size: #3  ETT size (mm): 7.5    Cormack-Lehane Classification: grade I - full view of glottis  Placement verified by: capnometry   Measured from: lips  ETT to lips (cm): 22  Number of attempts at approach: 1

## 2024-08-10 NOTE — PROGRESS NOTES
[facility] Pharmacy Dosing Service      Initial Pharmacokinetic Consult for Vancomycin Dosing     Greg Uriarte is a 39 year old female who is being initiated on vancomycin therapy for possible CNS infection.  Pharmacy has been asked to dose vancomycin by Dr. Martinez.  The initial treatment and monitoring approach will be steady state AUC strategy.        Weight and Temperature:    Wt Readings from Last 1 Encounters:   08/10/24 97 kg (213 lb 13.5 oz)        Temp Readings from Last 1 Encounters:   08/10/24 98 °F (36.7 °C) (Temporal)      Labs:   Recent Labs   Lab 24  2156   CREATSERUM 0.61      Estimated Creatinine Clearance: 113.6 mL/min (based on SCr of 0.61 mg/dL).     Recent Labs   Lab 24  2156   WBC 13.1*          The Pharmacokinetic Target is:     to 600 mg-h/L and trough <=15 mg/L    Renal Dosing Considerations:    None     Assessment/Plan:   Initial/Loading dose: Has received 2000 mg IV (20 mg/kg, capped at 2250 mg) x 1 initial dose.      Maintenance dose: Pharmacy will dose vancomycin at 1500 mg IV every 12 hours.    Monitorin) Plan for vancomycin peak and trough to be obtained at steady state.    2) Pharmacy will order SCr as clinically indicated to assess renal function.    3) Pharmacy will monitor for toxicity and efficacy, adjust vancomycin dose and/or frequency, and order vancomycin levels as appropriate per the Pharmacy and Therapeutics Committee approved protocol until discontinuation of the medication.       We appreciate the opportunity to assist in the care of this patient.     Rachel Short, PharmD  8/10/2024  4:10 PM  [facility]  Pharmacy Extension: [phone]
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
## 2024-08-10 NOTE — PLAN OF CARE
Received patient from ED at 0935. Neuros Q1-intact. See flowsheets for full assessment. MRA completed. Monitor shows sinus rhythm. SBP goal < 140 maintained. NPO for possible procedure. External urinary catheter in place. Patient complained of headache, see MAR.     Dr. Collins at bedside, discussed MRA results. Plan for cerebral angiogram this evening. Consent signed, placed in patient chart. Patient transported to  at 1700.

## 2024-08-10 NOTE — PROGRESS NOTES
[facility] Pharmacy Dosing Service      Initial Pharmacokinetic Consult for Vancomycin Dosing     Greg Uriarte is a 39 year old female who is being initiated on vancomycin therapy for possible CNS infection.  Pharmacy has been asked to dose vancomycin by Dr. Martinez.  The initial treatment and monitoring approach will be steady state AUC strategy.        Weight and Temperature:    Wt Readings from Last 1 Encounters:   08/10/24 97 kg (213 lb 13.5 oz)        Temp Readings from Last 1 Encounters:   08/10/24 98 °F (36.7 °C) (Temporal)      Labs:   Recent Labs   Lab 24  2156   CREATSERUM 0.61      Estimated Creatinine Clearance: 113.6 mL/min (based on SCr of 0.61 mg/dL).     Recent Labs   Lab 24  2156   WBC 13.1*          The Pharmacokinetic Target is:     to 600 mg-h/L and trough <=15 mg/L    Renal Dosing Considerations:    None     Assessment/Plan:   Initial/Loading dose: Has received 2000 mg IV (20 mg/kg, capped at 2250 mg) x 1 initial dose.      Maintenance dose: Pharmacy will dose vancomycin at 1500 mg IV every 12 hours. Give this first maintenance dose about 10 hours after the initial dose (i.e., give it at 1800 today) since the initial dose was about 20% below a full load of 2500 mg.    Monitorin) Plan for vancomycin peak and trough to be obtained at steady state.    2) Pharmacy will order SCr as clinically indicated to assess renal function.    3) Pharmacy will monitor for toxicity and efficacy, adjust vancomycin dose and/or frequency, and order vancomycin levels as appropriate per the Pharmacy and Therapeutics Committee approved protocol until discontinuation of the medication.       We appreciate the opportunity to assist in the care of this patient.     Rachel Short, PharmD  8/10/2024  4:10 PM  [facility]  Pharmacy Extension: [phone]

## 2024-08-10 NOTE — CONSULTS
[facility]  Neurocritical Care Consult Note    Greg Uriarte Patient Status:  Inpatient    1985 MRN [id number]   Location [facility] 6NE-A Attending Cheryle Martinez MD   Hosp Day # 0 PCP Leana Gardner MD       Reason for Consultation:   HA    HPI:   Patient is a 39 year old female with a h/o htn, prediabetes, anxiety and depression p/w severe HA c/f sentinel sah . Pt noted onset of severe HA and neck pain/n/v thus came to ED where w/u revealed ct/cta with ventricular enlargement compared to prior and mri with possible aneurysm thus underwent LP and admitted for further eval.     Past Medical History:    Anemia    Anxiety and depression    Essential hypertension    Gestational diabetes (HCC)    diet    Prediabetes    A1c 6.2    Sickle cell trait (HCC)       Past Surgical History:   Procedure Laterality Date    Anesth, section      x 3       Medications Prior to Admission   Medication Sig Dispense Refill Last Dose    [] clarithromycin 500 MG Oral Tab Take 1 tablet (500 mg total) by mouth 2 (two) times daily for 10 days. 20 tablet 0     [] albuterol 108 (90 Base) MCG/ACT Inhalation Aero Soln Inhale 2 puffs into the lungs every 4 (four) hours as needed for Wheezing. 1 each 0     tranexamic acid 650 MG Oral Tab Take 2 tablets (1,300 mg total) by mouth in the morning, at noon, and at bedtime. (Patient not taking: Reported on 2024) 90 tablet 3     sertraline 50 MG Oral Tab Take 1 tablet (50 mg total) by mouth daily. (Patient not taking: Reported on 2024) 30 tablet 2      Allergies   Allergen Reactions    Amoxicillin RASH    Latex HIVES     Social History     Socioeconomic History    Marital status:    Tobacco Use    Smoking status: Some Days     Types: Cigarettes    Smokeless tobacco: Never    Tobacco comments:     3-4 cigs   Vaping Use    Vaping status: Never Used   Substance and Sexual Activity    Alcohol use: No    Drug use: No    Sexual activity: Yes      Partners: Male     Birth control/protection: Tubal Ligation   Other Topics Concern    Caffeine Concern No    Exercise Yes    Seat Belt Yes     Family History   Problem Relation Age of Onset    Diabetes Mother     High Blood Pressure Mother     Other (heart murmur) Mother     Other (sarcoma) Brother     Other (neurofibromatosis) Brother     Depression Brother     Anxiety Brother        Current Meds:  Current Facility-Administered Medications   Medication Dose Route Frequency    sodium chloride 0.9% infusion  125 mL/hr Intravenous Continuous    ondansetron (Zofran) 4 MG/2ML injection 4 mg  4 mg Intravenous Q4H PRN    acyclovir (Zovirax) 600 mg in sodium chloride 0.9% 100 mL IVPB  10 mg/kg (Ideal) Intravenous Q8H    sodium chloride 0.9% infusion   Intravenous Continuous    labetalol (Trandate) 5 mg/mL injection 10 mg  10 mg Intravenous Q10 Min PRN    hydrALAzine (Apresoline) 20 mg/mL injection 10 mg  10 mg Intravenous Q2H PRN    acetaminophen (Tylenol) tab 650 mg  650 mg Oral Q4H PRN    Or    acetaminophen (Tylenol) rectal suppository 650 mg  650 mg Rectal Q4H PRN    ondansetron (Zofran) 4 MG/2ML injection 4 mg  4 mg Intravenous Q6H PRN    Or    metoclopramide (Reglan) 5 mg/mL injection 10 mg  10 mg Intravenous Q8H PRN    levETIRAcetam (Keppra) 500 mg/5mL injection 500 mg  500 mg Intravenous q12h    niMODipine (Nimotop) cap 60 mg  60 mg Oral 6 times per day    midazolam (Versed) 2 MG/2ML injection 2 mg  2 mg Intravenous Q15 Min PRN    HYDROmorphone (Dilaudid) 1 MG/ML injection 0.2 mg  0.2 mg Intravenous Q2H PRN    Or    HYDROmorphone (Dilaudid) 1 MG/ML injection 0.4 mg  0.4 mg Intravenous Q2H PRN    Or    HYDROmorphone (Dilaudid) 1 MG/ML injection 0.8 mg  0.8 mg Intravenous Q2H PRN    acetaminophen (Ofirmev) 10 mg/mL infusion premix 1,000 mg  1,000 mg Intravenous Q6H PRN    cefTRIAXone (Rocephin) 2 g in sodium chloride 0.9% 100 mL IVPB-ADDV  2 g Intravenous Q24H    vancomycin (Vancocin) 1.5 g in sodium chloride  0.9% 250mL IVPB premix  15 mg/kg Intravenous Q24H       Review of Systems:     Constitutional:    Denies unusual weight loss or weight gain, fever/chills or night sweats.  HEENT:                Denies changes in vision or difficulty swallowing.  Pulm:                    Denies dyspnea, cough, or sputum production  Cardiac:               Denies chest pain, palpitations or lower extremity edema.  GI:                         Denies constipation, heartburn or melena.  :                       Denies dysuria or hematuria.  Skin:                     Denies rashes or open areas.  Neuro:                  As per HPI    All other systems were reviewed and are negative.    Vitals:   Temp:  [97 °F (36.1 °C)-98.1 °F (36.7 °C)] 98.1 °F (36.7 °C)  Pulse:  [] 81  Resp:  [14-19] 14  BP: (120-151)/(70-87) 144/82  SpO2:  [95 %-100 %] 95 %  Body mass index is 35.59 kg/m².    Intake/Output:    Intake/Output Summary (Last 24 hours) at 8/10/2024 1051  Last data filed at 8/10/2024 0824  Gross per 24 hour   Intake 1100 ml   Output --   Net 1100 ml       Physical Examination:   General- No acute distress  CV- RRR  Resp- CTA Bilat  Neuro-  Mental status- awake and alert, regards and follows commands, oriented x3, speech fluent  Cranial nerves- pupils equally round and reactive to light, extraocular muscles intact, face symmetric, visual fields full  Motor- 5/5 throughout  Sens-  Intact to light touch    Diagnostics:   CTA BRAIN + CTA CAROTIDS (CPT=70496/67057)    Result Date: 8/9/2024  CONCLUSION:  The CTA appears normal, but the caliber of the lateral and 3rd ventricles has increased compared to the prior.  The patient is young, and the rest of the brain does not appear atrophied, and the ventricular size change does not appear to be secondary to brain volume loss and could reflect developing hydrocephalus.  Furthermore there is suggestion of effacement of the basilar cisterns.  Consider MRI follow-up.  Note is also made of prominent  adenoid and tonsillar soft tissues.   LOCATION:  [facility]   Dictated by (CST): Dereje Moore MD on 8/09/2024 at 11:36 PM     Finalized by (CST): Dereje Moore MD on 8/09/2024 at 11:44 PM        Lab Review     Recent Labs   Lab 08/09/24 2156      K 3.2*      CO2 22.0   *   BUN 9   CREATSERUM 0.61     Recent Labs   Lab 08/09/24 2156   WBC 13.1*   HGB 9.7*   .0       Assesment/Plan:     Neuro:  HA/n/v- in setting of ventricular enlargement and possible aneurysm, c/f sentinel sah vs infectious process.   Will check mra brain and cerebral angio per PADILLA for further eval.   Cont sah protocol with neurochecks, keppra, nimotop and TCDs.   PADILLA and NS on c/s  Cardiac:  Htn- sbp goal 100-150  Pulmonary:  Stable on RA  Renal:  IVFs, monitor BUN/Cr  GI:  Npo for angio  Heme/ID:  Afebrile, trend leukocytosis. ID on c/s given csf leukocytosis. Cont empiric abx and f/u cultures.   Endocrine/Rheum:  Monitor glucose, sliding scale insulin prn  DVT Prophylaxis:  SCD’s    Goals of the Day: neurochecks, mra, angio   A total of 80 minutes of critical care time (exclusive of billable procedures) was administered to manage and/or prevent neurologic instability. This involved direct patient intervention, complex decision making, and/or extensive discussions with the patient, family, and clinical staff.    Thank you for allowing me to participate in the care of this patient.     Jo Winters MD, [facility]  Medical Director of System Neurosciences  Chief, Section of Neurocritical Care  [facility]

## 2024-08-10 NOTE — ED INITIAL ASSESSMENT (HPI)
Pt arrives via emd w c/o neck and back pain. Pt reports it started 20 min before ems arrived and \"it feels like hot oil is being poured down her back\". Ems gave pt 1100 mcg of fentanyl pta. Pt denies relief w meds.

## 2024-08-10 NOTE — ANESTHESIA PROCEDURE NOTES
Arterial Line    Date/Time: 8/10/2024 5:50 PM    Performed by: Abran Chavez MD  Authorized by: Abran Chavez MD    General Information and Staff    Procedure Start:  8/10/2024 5:50 PM  Procedure End:  8/10/2024 5:55 PM  Anesthesiologist:  Abran Chavez MD  Performed By:  Anesthesiologist  Patient Location:  OR  Indication: continuous blood pressure monitoring and blood sampling needed    Site Identification: real time ultrasound guided and surface landmarks    Preanesthetic Checklist: 2 patient identifiers, IV checked, risks and benefits discussed, monitors and equipment checked, pre-op evaluation, timeout performed, anesthesia consent and sterile technique used    Procedure Details    Catheter Size:  20 G  Catheter Length:  1 and 3/4 inch  Catheter Type:  Arrow  Seldinger Technique?: Yes    Laterality:  Left  Site:  Radial artery  Site Prep: chlorhexidine    Line Secured:  Wrist Brace, tape and Tegaderm    Assessment    Events: patient tolerated procedure well with no complications      Medications  8/10/2024 5:50 PM      Additional Comments

## 2024-08-10 NOTE — PROGRESS NOTES
[facility] Note:  Renal Adjustment for ceftriaxone (ROCEPHIN)    Greg Uriarte is a 39 year old patient who has been prescribed ceftriaxone (ROCEPHIN) 2 g every 24 hrs.      The estimated creatinine clearance is 113.6 mL/min (based on SCr of 0.61 mg/dL).     The dose has been adjusted to ceftriaxone (ROCEPHIN) 2 g every 12 hrs per hospital renal dose adjustment protocol for treatment of a possible CNS infection.    Thank you,  Rachel Short, PharmD  8/10/2024  12:52 PM

## 2024-08-10 NOTE — ED QUICK NOTES
Orders for admission, patient is aware of plan and ready to go upstairs. Any questions, please call ED RN Radha at extension [phone].     Patient Covid vaccination status: Unvaccinated     COVID Test Ordered in ED: Rapid SARS-CoV-2 by PCR    COVID Suspicion at Admission: N/A    Running Infusions:    sodium chloride 125 mL/hr (08/10/24 0450)        Mental Status/LOC at time of transport: A&O x4    Other pertinent information:   CIWA score: N/A   NIH score:  N/A

## 2024-08-10 NOTE — SLP NOTE
Discussed with RN who reports pt to be going for procedure today.  Will hold Swallow study until tomorrow per RN.

## 2024-08-10 NOTE — H&P
.CC:   Chief Complaint   Patient presents with    Pain        PCP: Leana Gardner MD    History of Present Illness: Patient is a 39 year old female with PMH sig for htn, headaches (migraines, takes ibuprofen) who presented with severe HA with feeling of hot liquid going down back that started yesterday afternoon. 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 Never had anything like this before. No fevers/chills, no gi/gu symptoms. She has been vomiting since coming in here and has been in ED since last night with w/u including CTA head/neck showing slight ventricular enlargement, MRI brain with ?aneurysm (and on CTA as well in retrospect), LP with WBCs, protein, and sig RBCs but improving in subsequent collection tubes.     Neuro/neuroir/neurosurg, id all consulted. On arrival to NeuroICU pt c/o recurrence of pain. Morphine has helped for brief periods (a few minutes). Feels sore along neck as well as in eyes. Denies any numbness/tingling/weakness in extremities      PMH  Past Medical History:    Anemia    Anxiety and depression    Essential hypertension    Gestational diabetes (HCC)    diet    Prediabetes    A1c 6.2    Sickle cell trait (HCC)        PSH  Past Surgical History:   Procedure Laterality Date    Anesth, section      x 3        ALL:  Allergies   Allergen Reactions    Amoxicillin RASH    Latex HIVES        Home Medications:  No outpatient medications have been marked as taking for the 24 encounter (Hospital Encounter).         Soc Hx  Social History     Tobacco Use    Smoking status: Some Days     Types: Cigarettes    Smokeless tobacco: Never    Tobacco comments:     3-4 cigs   Substance Use Topics    Alcohol use: No        Fam Hx  Family History   Problem Relation Age of Onset    Diabetes Mother     High Blood Pressure Mother     Other (heart murmur) Mother     Other (sarcoma) Brother     Other (neurofibromatosis)  Brother     Depression Brother     Anxiety Brother        Review of Systems  Comprehensive ROS reviewed and negative except for what's stated above.       OBJECTIVE:  /77   Pulse 106   Temp 97 °F (36.1 °C) (Temporal)   Resp 19   Wt 213 lb 13.5 oz (97 kg)   LMP 04/27/2024 (Exact Date)   SpO2 100%   BMI 35.59 kg/m²     Gen- mildly uncomfortable, appears stated age  Heent- anicteric  Neck- ttp lida along L, did not test rom as pt uncomfortable  CV- RRR no murmurs. No FINN  Lungs- CTAB, good respiratory effort  Abd- soft, ntnd, no organomegaly, BS+  Derm- no rashes  MSK- good muscle strength and tone, no joint swelling  Neuro- alert and oriented X 3, moving all extremities, speech fluent      Diagnostic Data:    CBC/Chem  Recent Labs   Lab 08/09/24 2156   WBC 13.1*   HGB 9.7*   MCV 72.8*   .0       Recent Labs   Lab 08/09/24 2156      K 3.2*      CO2 22.0   BUN 9   CREATSERUM 0.61   *   CA 9.5       Recent Labs   Lab 08/09/24 2156   ALT 14   AST 13   ALB 4.8       No results for input(s): \"TROP\" in the last 168 hours.    Additional Diagnostics: personally reviewed EKG-  Nsr, no st/t abn      Radiology: CTA BRAIN + CTA CAROTIDS (CPT=70496/88554)    Result Date: 8/9/2024  PROCEDURE:  CTA BRAIN + CTA CAROTIDS (CPT=70496/08084)  COMPARISON:  [facility] , CT, CT BRAIN OR HEAD (57774), 2/17/2016, 10:13 AM.  INDICATIONS:  sudden  onset neck and back pain no trauma  TECHNIQUE:  CT angiography of the head and neck were obtained with non-ionic contrast.  3D volume rendering images were obtained by the technologist as well as the radiologist on an independent workstation.  Multiplanar 3D reformatted imaging including multiplanar MIP images were obtained.  Curved planar reformats were performed through the carotid and vertebral arteries. All measurements obtained in this exam were performed using NASCET criteria.  Dose reduction techniques were used. Dose information is transmitted to the ACR  (American College of Radiology) NRDR (National Radiology Data Registry) which includes the Dose Index Registry.  PATIENT STATED HISTORY:(As transcribed by Technologist)  Patient has head and back pain   CONTRAST USED:  75cc of Isovue 370  FINDINGS:   Compared with prior CT examination from 2016, lateral and 3rd ventricles are of greater volume.  This includes all horns of the lateral ventricles.  Fourth ventricle stable size.  There is also less well visualized basilar cisterns which may be effaced.  For example series 5, image 26. Note that on the CTA head and neck examination, brain imaging displayed on axial images, without coronal/sagittal noncontrast brain images.  Images of the upper lungs included on this examination show no acute process in the upper lungs.  There is no acute disease process identified involving the spine structures visualized.  Prominent adenoid and tonsillar soft tissues.  CTA:  Extracranial CTA: CTA of the neck shows patency of the right and left vertebral arteries, the right and left common carotid, internal carotid and external carotid arteries and carotid bulb without sign of dissection, occlusion or acute thrombosis of these vessels. No hemodynamically significant stenosis is identified   Intracranial CTA: CTA of the intracranial circulation shows patency of the distal right and left vertebral arteries, the distal right and left cervical ICA, the intracranial ICA, and the anterior, middle, and posterior cerebral arteries bilaterally.  The  basilar artery is also patent.  No flow limiting stenosis seen.             CONCLUSION:  The CTA appears normal, but the caliber of the lateral and 3rd ventricles has increased compared to the prior.  The patient is young, and the rest of the brain does not appear atrophied, and the ventricular size change does not appear to be secondary to brain volume loss and could reflect developing hydrocephalus.  Furthermore there is suggestion of effacement  of the basilar cisterns.  Consider MRI follow-up.  Note is also made of prominent adenoid and tonsillar soft tissues.   LOCATION:  [facility]   Dictated by (CST): Dereje Moore MD on 8/09/2024 at 11:36 PM     Finalized by (CST): Dereje Moore MD on 8/09/2024 at 11:44 PM          Available outpatient records reviewed--    ASSESSMENT / PLAN:     40 yo woman with h/o htn and migraines who presented with severe HA with sensation of hot liquid down back of head. In course of ED w/u, diagnoses of meningitis, sah/aneurysm considered.    1) headache- concern for sah/aneurysm v meningitis  - neurology, neurosurgery, neuroIR, ID all on consult  - further w/u being planned, MRA brain, transcranial dopplers  - f/u CSF culture  - will give iv tylenol and iv dilaudid for now for HA    2) HTN-  per chart but does not appear to be on meds for this. Bp parameters per neuro ICU    SCDs          Cheryle Martinez MD  [facility] Hospitalist  Pager [phone]  Answering Service number: [phone]

## 2024-08-10 NOTE — CONSULTS
[facility]  [facility] Neurosurgery Consult    Greg Uriarte Patient Status:  Inpatient    1985 MRN [id number]   Location [facility] 6NE-A Attending Cheryel Martinez MD   Hosp Day # 0 PCP Leana Gardner MD     REASON FOR CONSULTATION:  Headache     HISTORY OF PRESENT ILLNESS     Greg Uriarte is a pleasant 39 year old female with PMH of HTN, DM, sickle cell trait, anemia and anxiety/depression who presented to ED with severe headache. Pt endorses onset of symptoms yesterday afternoon upon removing her head wrap. Denies a thunderclap headache but endorsed gradual onset of a diffuse, bandlike headache with a sensation of \"rushing hot liquid pooling within the head, toward the right ear and down the spine.\" Onset of headache was followed by nausea and emesis. She endorses associated photophobia, sensitivity to sound, neck pain and nuchal rigidity. Pt reports a ~7 year episodic history of severe headaches and states this headache feels different and has persisted longer than previous. She took ibuprofen, to no relief, thus presented to ED.  Cranial imaging workup suggests mild interval increase in ventricular size when compared to previous imaging from 2016, as well as concern for possible intracranial aneurysm. Pt underwent LP in ED which is noted to be traumatic. She has been admitted to the CNICU for continued close monitoring and further work-up.    On exam, patient continues to report severe headache poorly managed by medications administered in ED. She reports ongoing neck stiffness and head pressure. Denies dizziness, visual disturbance, changes in speech, numbness, paresthesias, or focal weakness. She does not take any blood thinners nor antiplatelet agents.     PAST MEDICAL HISTORY     Past Medical History:    Anemia    Anxiety and depression    Essential hypertension    Gestational diabetes (HCC)    diet    Prediabetes    A1c 6.2    Sickle cell trait (HCC)     PAST SURGICAL HISTORY:  Past Surgical  History:   Procedure Laterality Date    Anesth, section      x 3     FAMILY HISTORY:  family history includes Anxiety in her brother; Depression in her brother; Diabetes in her mother; High Blood Pressure in her mother; heart murmur in her mother; neurofibromatosis in her brother; sarcoma in her brother.    SOCIAL HISTORY:   reports that she has been smoking cigarettes. She has never used smokeless tobacco. She reports that she does not drink alcohol and does not use drugs.    ALLERGIES     Allergies   Allergen Reactions    Amoxicillin RASH    Latex HIVES     MEDICATIONS     Medications Prior to Admission   Medication Sig Dispense Refill Last Dose    [] clarithromycin 500 MG Oral Tab Take 1 tablet (500 mg total) by mouth 2 (two) times daily for 10 days. 20 tablet 0     [] albuterol 108 (90 Base) MCG/ACT Inhalation Aero Soln Inhale 2 puffs into the lungs every 4 (four) hours as needed for Wheezing. 1 each 0     tranexamic acid 650 MG Oral Tab Take 2 tablets (1,300 mg total) by mouth in the morning, at noon, and at bedtime. (Patient not taking: Reported on 2024) 90 tablet 3     sertraline 50 MG Oral Tab Take 1 tablet (50 mg total) by mouth daily. (Patient not taking: Reported on 2024) 30 tablet 2      Current Facility-Administered Medications   Medication Dose Route Frequency    sodium chloride 0.9% infusion  125 mL/hr Intravenous Continuous    ondansetron (Zofran) 4 MG/2ML injection 4 mg  4 mg Intravenous Q4H PRN    acyclovir (Zovirax) 600 mg in sodium chloride 0.9% 100 mL IVPB  10 mg/kg (Ideal) Intravenous Q8H    sodium chloride 0.9% infusion   Intravenous Continuous    labetalol (Trandate) 5 mg/mL injection 10 mg  10 mg Intravenous Q10 Min PRN    hydrALAzine (Apresoline) 20 mg/mL injection 10 mg  10 mg Intravenous Q2H PRN    acetaminophen (Tylenol) tab 650 mg  650 mg Oral Q4H PRN    Or    acetaminophen (Tylenol) rectal suppository 650 mg  650 mg Rectal Q4H PRN    ondansetron (Zofran) 4  MG/2ML injection 4 mg  4 mg Intravenous Q6H PRN    Or    metoclopramide (Reglan) 5 mg/mL injection 10 mg  10 mg Intravenous Q8H PRN    levETIRAcetam (Keppra) 500 mg/5mL injection 500 mg  500 mg Intravenous q12h    niMODipine (Nimotop) cap 60 mg  60 mg Oral 6 times per day    midazolam (Versed) 2 MG/2ML injection 2 mg  2 mg Intravenous Q15 Min PRN    HYDROmorphone (Dilaudid) 1 MG/ML injection 0.2 mg  0.2 mg Intravenous Q2H PRN    Or    HYDROmorphone (Dilaudid) 1 MG/ML injection 0.4 mg  0.4 mg Intravenous Q2H PRN    Or    HYDROmorphone (Dilaudid) 1 MG/ML injection 0.8 mg  0.8 mg Intravenous Q2H PRN    acetaminophen (Ofirmev) 10 mg/mL infusion premix 1,000 mg  1,000 mg Intravenous Q6H PRN    cefTRIAXone (Rocephin) 2 g in sodium chloride 0.9% 100 mL IVPB-ADDV  2 g Intravenous Q24H    vancomycin (Vancocin) 1.5 g in sodium chloride 0.9% 250mL IVPB premix  15 mg/kg Intravenous Q24H       REVIEW OF SYSTEMS     Comprehensive Review of Systems obtained, and is negative other than that mentioned in the History of Present Illness.      PHYSICAL EXAMINATION     VITALS: BP (!) 142/91   Pulse 98   Temp 98.1 °F (36.7 °C) (Temporal)   Resp 21   Wt 213 lb 13.5 oz (97 kg)   LMP 04/27/2024 (Exact Date)   SpO2 95%   BMI 35.59 kg/m²     GENERAL:  No acute distress, non-toxic appearing, speech fluent, mood appropriate    HEENT:  Normocephalic, atraumatic    RESP: Non-labored, easy, even    CV: NSR on tele    NEUROLOGICAL:  Alert and oriented x3.  Speech fluent. Comprehension intact.  PERRLA +3 brisk,  EOMI.  VFF.  Face is symmetrical. Tongue is midline.  Uvula and palate elevate symmetrically. Shoulder shrug normal bilaterally. Remaining CN's GI.  Sensation to light touch is intact bilaterally.  Motor: No drift.  CHAVEZ X 4. Gait deferred.       DIAGNOSTIC DATA     Lab Results   Component Value Date    WBC 13.1 08/09/2024    HGB 9.7 08/09/2024    HCT 32.4 08/09/2024    .0 08/09/2024    CREATSERUM 0.61 08/09/2024    BUN 9  08/09/2024     08/09/2024    K 3.2 08/09/2024     08/09/2024    CO2 22.0 08/09/2024     08/09/2024    CA 9.5 08/09/2024    ALB 4.8 08/09/2024    ALKPHO 111 08/09/2024    BILT 0.3 08/09/2024    TP 7.5 08/09/2024    AST 13 08/09/2024    ALT 14 08/09/2024       IMAGING     CTA BRAIN + CTA CAROTIDS (CPT=70496/84171)    Result Date: 8/9/2024  CONCLUSION:  The CTA appears normal, but the caliber of the lateral and 3rd ventricles has increased compared to the prior.  The patient is young, and the rest of the brain does not appear atrophied, and the ventricular size change does not appear to be secondary to brain volume loss and could reflect developing hydrocephalus.  Furthermore there is suggestion of effacement of the basilar cisterns.  Consider MRI follow-up.  Note is also made of prominent adenoid and tonsillar soft tissues.   LOCATION:  [facility]   Dictated by (CST): Dereje Moore MD on 8/09/2024 at 11:36 PM     Finalized by (CST): Dereje Moore MD on 8/09/2024 at 11:44 PM         ASSESSMENT & PLAN     ASSESSMENT:  1. Acute intractable headache, unspecified headache type    2.       Ventricular enlargement  3.       Concern for possible intracranial aneurysm     PLAN:  No acute surgical intervention is indicated at this time  Neuro checks q1h   MRA brain pending  TCDs pending  Cerebral angiogram per PADILLA   Critical care management, TCDs, AEDs and BP management per NeuroCC  Medical management per hospitalist    Thank you very much for the consult. Will continue to follow.     Barbara Bartholomew, JOSEPHINE-NP  [facility]  8/10/2024    Total visit time: 30 minutes; More than 50% spent coordinating care, counseling, reviewing imaging and discussing medication therapy.   Is this a shared or split note between Advanced Practice Provider and Physician? Yes

## 2024-08-11 ENCOUNTER — APPOINTMENT (OUTPATIENT)
Dept: ULTRASOUND IMAGING | Facility: HOSPITAL | Age: 39
End: 2024-08-11
Attending: INTERNAL MEDICINE
Payer: MEDICAID

## 2024-08-11 ENCOUNTER — APPOINTMENT (OUTPATIENT)
Dept: CT IMAGING | Facility: HOSPITAL | Age: 39
End: 2024-08-11
Payer: MEDICAID

## 2024-08-11 PROBLEM — I60.7 CEREBRAL ANEURYSM RUPTURE (HCC): Status: ACTIVE | Noted: 2024-08-11

## 2024-08-11 PROBLEM — I60.8 ANEURYSMAL SUBARACHNOID HEMORRHAGE (HCC): Status: ACTIVE | Noted: 2024-08-11

## 2024-08-11 LAB
ANION GAP SERPL CALC-SCNC: 7 MMOL/L (ref 0–18)
BUN BLD-MCNC: <5 MG/DL (ref 9–23)
CALCIUM BLD-MCNC: 8.9 MG/DL (ref 8.7–10.4)
CHLORIDE SERPL-SCNC: 114 MMOL/L (ref 98–112)
CO2 SERPL-SCNC: 22 MMOL/L (ref 21–32)
CREAT BLD-MCNC: 0.62 MG/DL
EGFRCR SERPLBLD CKD-EPI 2021: 118 ML/MIN/1.73M2 (ref 60–?)
ERYTHROCYTE [DISTWIDTH] IN BLOOD BY AUTOMATED COUNT: 18.4 %
GLUCOSE BLD-MCNC: 109 MG/DL (ref 70–99)
GLUCOSE BLD-MCNC: 127 MG/DL (ref 70–99)
GLUCOSE BLD-MCNC: 150 MG/DL (ref 70–99)
GLUCOSE BLD-MCNC: 168 MG/DL (ref 70–99)
HCT VFR BLD AUTO: 29.9 %
HGB BLD-MCNC: 9 G/DL
MAGNESIUM SERPL-MCNC: 1.9 MG/DL (ref 1.6–2.6)
MCH RBC QN AUTO: 21.6 PG (ref 26–34)
MCHC RBC AUTO-ENTMCNC: 30.1 G/DL (ref 31–37)
MCV RBC AUTO: 71.7 FL
PLATELET # BLD AUTO: 313 10(3)UL (ref 150–450)
POTASSIUM SERPL-SCNC: 3.4 MMOL/L (ref 3.5–5.1)
RBC # BLD AUTO: 4.17 X10(6)UL
SODIUM SERPL-SCNC: 143 MMOL/L (ref 136–145)
WBC # BLD AUTO: 9.3 X10(3) UL (ref 4–11)

## 2024-08-11 PROCEDURE — 99291 CRITICAL CARE FIRST HOUR: CPT | Performed by: STUDENT IN AN ORGANIZED HEALTH CARE EDUCATION/TRAINING PROGRAM

## 2024-08-11 PROCEDURE — 99291 CRITICAL CARE FIRST HOUR: CPT | Performed by: INTERNAL MEDICINE

## 2024-08-11 PROCEDURE — 93886 INTRACRANIAL COMPLETE STUDY: CPT | Performed by: INTERNAL MEDICINE

## 2024-08-11 PROCEDURE — 70450 CT HEAD/BRAIN W/O DYE: CPT

## 2024-08-11 RX ORDER — POTASSIUM CHLORIDE 1500 MG/1
40 TABLET, EXTENDED RELEASE ORAL ONCE
Status: COMPLETED | OUTPATIENT
Start: 2024-08-11 | End: 2024-08-11

## 2024-08-11 RX ORDER — DEXAMETHASONE SODIUM PHOSPHATE 4 MG/ML
4 VIAL (ML) INJECTION EVERY 6 HOURS
Status: COMPLETED | OUTPATIENT
Start: 2024-08-11 | End: 2024-08-12

## 2024-08-11 RX ORDER — DEXAMETHASONE SODIUM PHOSPHATE 4 MG/ML
4 VIAL (ML) INJECTION EVERY 8 HOURS
Status: COMPLETED | OUTPATIENT
Start: 2024-08-12 | End: 2024-08-13

## 2024-08-11 RX ORDER — MAGNESIUM SULFATE HEPTAHYDRATE 40 MG/ML
2 INJECTION, SOLUTION INTRAVENOUS ONCE
Status: COMPLETED | OUTPATIENT
Start: 2024-08-11 | End: 2024-08-11

## 2024-08-11 NOTE — ANESTHESIA POSTPROCEDURE EVALUATION
[facility]    Greg Uriarte Patient Status:  Inpatient   Age/Gender 39 year old female MRN [id number]   Location [facility] 6NE-A Attending Cheryle Martinez MD   Hosp Day # 0 PCP Leana Gardner MD       Anesthesia Post-op Note        Procedure Summary       Date: 08/10/24 Room / Location: [facility] Interventional Suites    Anesthesia Start: 1726 Anesthesia Stop: 2146    Procedure: IR HEAD AND NECK EMBOLIZATION Diagnosis: (anuerysm)    Scheduled Providers:  Anesthesiologist: Jose Luis Cao MD    Anesthesia Type: general ASA Status: 2 - Emergent            Anesthesia Type: general    Vitals Value Taken Time   /55 08/10/24 2146   Temp 98.5 °F (36.9 °C) 08/10/24 2146   Pulse 107 08/10/24 2146   Resp 30 08/10/24 2146   SpO2 97 % 08/10/24 2146   Vitals shown include unfiled device data.    Patient Location: ICU    Anesthesia Type: general    Airway Patency: patent and extubated    Postop Pain Control: adequate    Mental Status: mildly sedated but able to meaningfully participate in the post-anesthesia evaluation    Nausea/Vomiting: none    Cardiopulmonary/Hydration status: stable euvolemic    Complications: no apparent anesthesia related complications    Postop vital signs: stable    Dental Exam: Unchanged from Preop    Sign out given to ICU staff.

## 2024-08-11 NOTE — PROGRESS NOTES
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 Headache has improved. States she was confused after returning from coiling last night but is now back to baseline. She denies  dizziness, visual disturbance, changes in speech, numbness, paresthesias, or focal weakness. Denies neck pain, nausea, or vomiting.       OBJECTIVE/PHYSICAL EXAM     VITALS:  /82   Pulse 99   Temp 99 °F (37.2 °C) (Temporal)   Resp 21   Wt 231 lb 14.8 oz (105.2 kg)   LMP 2024 (Exact Date)   SpO2 97%   BMI 38.59 kg/m²     GENERAL: No acute distress, non-toxic appearing, mood appropriate    HEENT: Normocephalic, atraumatic    RESP:  Respirations non-labored, even    CV:  NSR on tele    NEURO: Alert and oriented x3.  Able to follow commands.  Comprehension intact.  Speech clear, fluent.  Face is symmetric.  PERRLA +3 brisk, EOMI.  CN 2-12 grossly intact.  Sensation to light touch is intact bilaterally.  Finger-to-nose coordination is intact.  No pronator drift.  Strength 5/5 in all extremities. CHAVEZ x 4. Gait deferred.     LABS     Lab Results   Component Value Date    WBC 9.3 2024    HGB 9.0 2024    HCT 29.9 2024    .0 2024    CREATSERUM 0.62 2024    BUN <5 2024     2024    K 3.4 2024     2024    CO2 22.0 2024     2024    CA 8.9 2024    PTT 25.3 08/10/2024    INR 1.06 08/10/2024    PTP 13.8 08/10/2024    MG 1.9 2024    PGLU 150 2024       IMAGING     CT BRAIN OR HEAD (CPT=70450)    Result Date: 8/10/2024  CONCLUSION:  Interval coiling of the right PCOM aneurysm.    LOCATION:  [facility]    Dictated by (CST): Calin Griffith MD on 8/10/2024 at 10:43 PM     Finalized by (CST): Calin Griffith MD on 8/10/2024 at 10:46 PM       MRA BRAIN (WHG=15689)    Result Date: 8/10/2024  CONCLUSION:  1. 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 Hoskins.    JOSEPHINE Hadley-NP  [facility]  8/11/2024, 8:20 AM      A total of 10 minutes of visit time (exclusible of billable procedures) was administered.  >50 % of time spent counseling/coordinating care.  Is this a shared or split note between Advanced Practice Provider and Physician? Yes

## 2024-08-11 NOTE — SLP NOTE
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 Headache has improved. States she was confused after returning from coiling last night but is now back to baseline. Imaging results reviewed and noted below.  Currently on  RA with adequate SPO2 saturations.   PMH is significant for anxiety and depression which may impact care. Patient resides at home with spouse and is the mother of 4 children.   Prior to this hospitalization, patient was consuming a regular diet with thin liquids.  Patient indicates chief complaint of headache \"when thinking\" (sitting upright) and memory. Passed RN swallow screening. Patient is NPO. Spoke with RN who approved this visit and for patient to sit upright.    ASSESSMENT:   Patient was awake and alert, in ICU, PPD #1.  C/o headache (pain level=2/10) when seated upright. Able to maintain midline position with adequate trunk and head control when upright in bed. Oral motor mechanism exam was unremarkable. Patient presented with WFL oropharyngeal dysphagia with absent signs/symptoms of aspiration during the controlled conditions of this exam. Slow yet functional mastication skills.  Please see below objective section for details.  No dysphagia management warranted. Plan to follow-up with cognitive-communication  evaluation to assess memory, attention, language and executive functions. Slow, yet accurate responses to basic biographical questions and tearful this visit. Patient asked \"Is there anything to help me with my memory?\"      RECOMMENDATIONS/PLAN OF CARE   -  Recommend initiate diet with implementation of general swallow support strategies outlined below.   Diet Recommendations - Solids: Regular  Diet Recommendations - Liquids: Thin Liquids  Aspiration  Precautions: Upright position;Slow rate;Small bites and sips  Medication Administration Recommendations: One pill at a time    Treatment Plan/Recommendations: Aspiration precautions;Communication evaluation  - Cognitive-linguistic evaluation to be completed.   - Patient/family education on role of ST, results of upcoming evaluation    HISTORY   MEDICAL HISTORY  Reason for Referral: Stroke protocol    Problem List  Principal Problem:    Acute intractable headache, unspecified headache type  Active Problems:    Aneurysmal subarachnoid hemorrhage (HCC)    Cerebral aneurysm rupture (HCC)      Past Medical History  Past Medical History:    Anemia    Anxiety and depression    Essential hypertension    Gestational diabetes (HCC)    diet    Prediabetes    A1c 6.2    Sickle cell trait (HCC)       Prior Living Situation: Home with spouse (mother of 4 children)  Diet Prior to Admission: Regular;Thin liquids  Precautions: None;Seizure    Patient/Family Goals: to improve memory    SWALLOWING HISTORY  Current Diet Consistency: NPO  Dysphagia History: none      IMAGING   CT BRAIN OR HEADCONCLUSION:    1. No acute intracranial abnormality.  Right PCOM aneurysm clip is noted      LOCATION:  [facility]       Dictated by (CST): Robert Robles MD on 8/11/2024 at 10:32 AM       Finalized by (CST): Robert Robles MD on 8/11/2024 at 10:36 AM        OBJECTIVE   ORAL MOTOR EXAMINATION  Dentition: Natural  Symmetry: Within Functional Limits  Strength: Within Functional Limits     Range of Motion: Within Functional Limits  Rate of Motion: Within Functional Limits    Voice Quality: Weak  Respiratory Status: Unlabored  Consistencies Trialed: Thin liquids;Puree;Hard solid  Method of Presentation: Self presentation;Spoon;Cup;Straw;Single sips  Patient Positioning: Upright;Midline    Oral Phase of Swallow: Within Functional Limits                      Pharyngeal Phase of Swallow: Within Functional Limits           (Please note: Silent aspiration cannot  be evaluated clinically. Videofluoroscopic Swallow Study is required to rule-out silent aspiration.)    Esophageal Phase of Swallow: No complaints consistent with possible esophageal involvement  Comments:               GOALS  Goal #1 Cognitive-linguistic evaluation  Goal Established           FOLLOW UP  Treatment Plan/Recommendations: Aspiration precautions;Communication evaluation  Number of Visits to Meet Established Goals: 5  Follow Up Needed (Documentation Required): Yes  SLP Follow-up Date: 08/12/24    Thank you for your referral.   If you have any questions, please contact LYLY Traylor

## 2024-08-11 NOTE — PROGRESS NOTES
.[facility] note    PCP: Leana Gardner MD    Chief Complaint:  F/u headache    SUBJECTIVE:  Went for coil embolization of R ICA/JARED aneurysm last night  Afterwards was confused  Today, still feels a little foggy but improving as day has gone on.    OBJECTIVE:  Temp:  [97.3 °F (36.3 °C)-99 °F (37.2 °C)] 97.3 °F (36.3 °C)  Pulse:  [] 90  Resp:  [11-26] 11  BP: (108-163)/(50-87) 136/79  SpO2:  [93 %-100 %] 98 %  AO: ()/(20-75) 150/72    Intake/Output:    Intake/Output Summary (Last 24 hours) at 8/11/2024 1310  Last data filed at 8/11/2024 1200  Gross per 24 hour   Intake 7176 ml   Output 5675 ml   Net 1501 ml       Last 3 Weights   08/11/24 0700 231 lb 14.8 oz (105.2 kg)   08/10/24 1326 213 lb 13.5 oz (97 kg)   08/09/24 2053 213 lb 13.5 oz (97 kg)   05/05/24 1806 212 lb (96.2 kg)   02/11/23 1826 230 lb (104.3 kg)       Exam  Gen: No acute distress  HEENT: anicteric sclera, MMM  Pulm: Lungs clear, normal respiratory effort  CV: Heart with regular rate and rhythm, no peripheral edema  Abd: Abdomen soft, nontender, nondistended, no organomegaly, bowel sounds present  Skin: no rashes or lesions    Data Review:         Labs:     Recent Labs   Lab 08/09/24  2156 08/10/24  1640 08/11/24  0415   WBC 13.1*  --  9.3   HGB 9.7*  --  9.0*   MCV 72.8*  --  71.7*   .0  --  313.0   NE 9.71*  --   --    LYMABS 2.57  --   --    INR  --  1.06  --        Recent Labs   Lab 08/09/24 2156 08/11/24  0415    143   K 3.2* 3.4*    114*   CO2 22.0 22.0   BUN 9 <5*   CREATSERUM 0.61 0.62   CA 9.5 8.9   MG  --  1.9   * 109*       Recent Labs   Lab 08/09/24  2156   ALT 14   AST 13   ALB 4.8       No results for input(s): \"TROP\", \"CK\", \"PBNP\", \"PCT\" in the last 168 hours.    No results for input(s): \"CRP\", \"EVER\", \"LDH\", \"DDIMER\" in the last 168 hours.    Recent Labs   Lab 08/10/24  1137 08/10/24  2202 08/11/24  0650 08/11/24  1226   PGLU 116* 144* 150* 168*       Meds:   Scheduled Medication:   dexamethasone   4 mg Intravenous Q6H    Followed by    [START ON 8/12/2024] dexamethasone  4 mg Intravenous Q8H    acyclovir  10 mg/kg (Ideal) Intravenous Q8H    levETIRAcetam  500 mg Intravenous q12h    niMODipine  60 mg Oral 6 times per day    cefTRIAXone  2 g Intravenous q12h    Vancomycin IV  1 each Intravenous See Admin Instructions (RX holding)    vancomycin  1,500 mg Intravenous Q12H    aspirin  81 mg Oral Daily     Continuous Infusing Medication:   lactated ringers 100 mL/hr at 08/11/24 0837    niCARdipine Stopped (08/11/24 0000)     PRN Medication:  labetalol    hydrALAzine    acetaminophen **OR** acetaminophen    [DISCONTINUED] ondansetron **OR** metoclopramide    midazolam    acetaminophen    morphINE **OR** morphINE    ondansetron    niCARdipine       Microbiology:    Hospital Encounter on 08/09/24   1. CSF culture     Status: None (Preliminary result)    Collection Time: 08/10/24  6:13 AM    Specimen: CSF, lumbar puncture; Cerebral spinal fluid   Result Value Ref Range    CSF Culture Result No Growth at 18-24 hrs. N/A    CSF Smear 3+ WBCs seen N/A    CSF Smear No organisms seen N/A    CSF Smear This is a cytocentrifuged smear. N/A       Lab Results   Component Value Date    COVID19 Not Detected 08/09/2024    COVID19 Not Detected 02/11/2023    COVID19 DETECTED (A) 01/05/2022        Assessment/Plan:       38 yo woman with h/o htn and migraines who presented with severe HA with sensation of hot liquid down back of head. In course of ED w/u, diagnoses of meningitis, sah/aneurysm considered.     1) headache- 2/2 R ICA/JARED aneurysm rupture, SAH now s/p coil embolization. Meningitis was also considered  - repeat head ct neg and ultrasounds without evidence of vasospasm  - on nimodipine, keppra  - seems to be clinically improving.  - neurology, neurosurgery, neuroIR, ID all on consult  - csf culture neg, defer to ID re: continuing abx.  - cont iv tylenol and prn iv morphine for pain.     2) HTN-  per chart but does not appear  to be on meds for this. Bp parameters per neuro ICU. Nicardipine gtt.     SCDs           Cheryle Martinez MD  [facility]  Pager: [phone]

## 2024-08-11 NOTE — PROGRESS NOTES
[facility]  Neurocritical Care Progress Note     Greg Uriarte Patient Status:  Inpatient    1985 MRN [id number]   Location [facility] 6NE-A Attending Cheryle Martinez MD   Hosp Day # 1 PCP Leana Gardner MD     Subjective:   Patient is a 39 year old female h/o htn, prediabetes, anxiety and depression p/w severe HA c/f sentinel sah    Hospital course significant for noted onset of severe HA and neck pain/n/v thus came to ED where w/u revealed ct/cta with ventricular enlargement compared to prior and mri with possible aneurysm thus underwent LP and admitted for further eval.     No acute events overnight.    Vitals:   Temp:  [97.8 °F (36.6 °C)-99 °F (37.2 °C)] 99 °F (37.2 °C)  Pulse:  [] 99  Resp:  [11-26] 21  BP: (108-158)/(50-91) 148/82  SpO2:  [93 %-100 %] 97 %  AO: ()/(20-69) 125/67  Body mass index is 38.59 kg/m².    Intake/Output:    Intake/Output Summary (Last 24 hours) at 2024 0833  Last data filed at 2024 0600  Gross per 24 hour   Intake 6352 ml   Output 4975 ml   Net 1377 ml     Current Meds:  Current Facility-Administered Medications   Medication Dose Route Frequency    dexamethasone (Decadron) 4 MG/ML injection 4 mg  4 mg Intravenous Q6H    Followed by    [START ON 2024] dexamethasone (Decadron) 4 MG/ML injection 4 mg  4 mg Intravenous Q8H    potassium chloride (Klor-Con M20) tab 40 mEq  40 mEq Oral Once    acyclovir (Zovirax) 600 mg in sodium chloride 0.9% 100 mL IVPB  10 mg/kg (Ideal) Intravenous Q8H    labetalol (Trandate) 5 mg/mL injection 10 mg  10 mg Intravenous Q10 Min PRN    hydrALAzine (Apresoline) 20 mg/mL injection 10 mg  10 mg Intravenous Q2H PRN    acetaminophen (Tylenol) tab 650 mg  650 mg Oral Q4H PRN    Or    acetaminophen (Tylenol) rectal suppository 650 mg  650 mg Rectal Q4H PRN    metoclopramide (Reglan) 5 mg/mL injection 10 mg  10 mg Intravenous Q8H PRN    levETIRAcetam (Keppra) 500 mg/5mL injection 500 mg  500 mg Intravenous  q12h    niMODipine (Nimotop) cap 60 mg  60 mg Oral 6 times per day    midazolam (Versed) 2 MG/2ML injection 2 mg  2 mg Intravenous Q15 Min PRN    acetaminophen (Ofirmev) 10 mg/mL infusion premix 1,000 mg  1,000 mg Intravenous Q6H PRN    cefTRIAXone (Rocephin) 2 g in sodium chloride 0.9% 100 mL IVPB-ADDV  2 g Intravenous q12h    Vancomycin: PHARMACY DOSING  1 each Intravenous See Admin Instructions (RX holding)    vancomycin (Vancocin) 1.5 g in sodium chloride 0.9% 250mL IVPB premix  1,500 mg Intravenous Q12H    lactated ringers infusion   Intravenous Continuous    morphINE PF 2 MG/ML injection 1 mg  1 mg Intravenous Q2H PRN    Or    morphINE PF 2 MG/ML injection 2 mg  2 mg Intravenous Q2H PRN    ondansetron (Zofran) 4 MG/2ML injection 4 mg  4 mg Intravenous Q6H PRN    niCARdipine in sodium chloride 0.86% (carDENE) 20 mg/200mL infusion premix  5-15 mg/hr Intravenous Continuous PRN    aspirin chewable tab 81 mg  81 mg Oral Daily     Physical Examination:   General- No acute distress  CV- RRR  Resp- CTA Bilat  Neuro-  Mental status- awake and alert, regards and follows commands, oriented x3, speech fluent  Cranial nerves- pupils equally round and reactive to light, extraocular muscles intact, face symmetric, visual fields full  Motor- 5/5 throughout  Sens-  Intact to light touch    Diagnostics:   CT BRAIN OR HEAD (CPT=70450)    Result Date: 8/10/2024  CONCLUSION:  Interval coiling of the right PCOM aneurysm.    LOCATION:  [facility]   Dictated by (CST): Calin Griffith MD on 8/10/2024 at 10:43 PM     Finalized by (CST): Calin Griffith MD on 8/10/2024 at 10:46 PM       MRA BRAIN (TBT=31371)    Result Date: 8/10/2024  CONCLUSION:  1. Findings compatible with a right posterior communicating artery aneurysm measuring up to 0.5 x 0.4 cm   LOCATION:  [facility]   Dictated by (CST): Robert Robles MD on 8/10/2024 at 3:41 PM     Finalized by (CST): Robert Robles MD on 8/10/2024 at 3:49 PM      Lab Review     Recent Labs   Lab 08/09/24  4456  08/11/24  0415    143   K 3.2* 3.4*    114*   CO2 22.0 22.0   * 109*   BUN 9 <5*   CREATSERUM 0.61 0.62     Recent Labs   Lab 08/09/24  2156 08/11/24  0415   WBC 13.1* 9.3   HGB 9.7* 9.0*   .0 313.0     Assesment/Plan:     Neuro:  HA/n/v- in setting of ventricular enlargement and cerebral aneurysm, c/f sentinel/occult sah vs infectious process.   S/p successful coil embolization of supraclinoid R ica terminus/A1 aneurysm per PADILLA 8/10.   Cont sah protocol with neurochecks, keppra, nimotop and TCDs.   PADILLA and NS on c/s.   Cardiac:  Htn- sbp goal 120-160 per PADILLA  Pulmonary:  Stable on RA  Renal:  IVFs  GI:  PO as tolerated  Heme/ID:  Afebrile, no leukocytosis  Endocrine/Rheum:  Monitor glucose, sliding scale insulin prn  DVT Prophylaxis:  SCD’s    Goals of the Day: neurochecks   A total of 40 minutes of critical care time (exclusive of billable procedures) was administered to manage and/or prevent neurologic instability. This involved direct patient intervention, complex decision making, and/or extensive discussions with the patient, family, and clinical staff.    Thank you for allowing me to participate in the care of this patient.     Jo Winters MD, [facility]  Medical Director of System Neurosciences  Chief, Section of Neurocritical Care  [facility]

## 2024-08-11 NOTE — PLAN OF CARE
Assumed patient care at 0730. Patient resting in bed, alert/oriented. Neuros Q1- intact, see flowsheets for full assessment. VSS. Tolerating diet. Huerta removed, able to void. Patient complained of headache, see MAR. Up to chair with steady assist and walker. Patient and family updated with plan of care, all questions.

## 2024-08-11 NOTE — PLAN OF CARE
Pt received from  lab s/p aneurysm coiling x2. Pt very lethargic upon arrival to the unit. Pt's speech was slurred and pt was only oriented to person. Pt was following some simple commands but was inconsistent in her neuro exam. STAT post procedure CT done as ordered. Results discussed with Dr. Collins at bedside. When returning from the scan, pt was disoriented x 4. When asked to state her name, pt stated \"I don't know.\" Pt did not recognize her  at bedside, know where she was or why she was here. LUE weakness was also noted. Pt would grasp hand to command but would not lift LUE off of the bed. Pt complained of severe headache and had photosensitivity. BP was maintained within ordered parameters on Cardene gtt. Critical Care APN notified of pt's condition post procedure including complaints of severe pain and memory loss. See orders received. Pt has continued to become more alert throughout the shift. She is currently disoriented to time and situation; although pt does know she came to the hospital because of her headache. Pt is able to state her full name and the name of her  who is at the bedside. Pt is also following all commands and is raising her left arm off the bed. Cardene gtt weaned off. IVP Labetolol given x 1. HR 's NSR/ST. POC discussed with pt and pt's . All questions answered. See Epic for complete assessment.

## 2024-08-11 NOTE — OCCUPATIONAL THERAPY NOTE
OCCUPATIONAL THERAPY NEURO EVALUATION - INPATIENT     Room Number: 6607/6607-A  Evaluation Date: 8/11/2024  Type of Evaluation: Initial  Presenting Problem: R PCA aneurysm s/p coiling 8/10    Physician Order: IP Consult to Occupational Therapy  Reason for Therapy: ADL/IADL Dysfunction and Discharge Planning    OCCUPATIONAL THERAPY ASSESSMENT   Patient is currently functioning below baseline with ADL and mobility. Prior to admission, patient's baseline is independent.  Patient is requiring physical assist as a result of the following impairments: LUE strength/coordination, sitting balance, nausea/dizziness/pain, medical status, LE strength, standing balance, processing speed, tolerance of visual input. Occupational Therapy will continue to follow for duration of hospitalization.    Patient will benefit from continued skilled OT Services to facilitate return to prior level of function as patient demonstrates high motivation with excellent tolerance to an intensive therapy program      History Related to Current Admission: Patient is a 39 year old female admitted on 8/9/2024 with Presenting Problem: R PCA aneurysm s/p coiling 8/10. Co-Morbidities : HTN, HA, anxiety, depression      Recommendations for nursing staff:   Transfers: 2 assist with RW  Toileting location: bedside commode vs bed level  Positioning: pillow LUE  Other: encourage use of LUE    EVALUATION SESSION:  Patient Start of Session: semi supine, mom, spouse, and 2 daughters present  FUNCTIONAL TRANSFER ASSESSMENT  Sit to Stand: Edge of Bed  Edge of Bed: -- (mod of 2 RW)    BED MOBILITY  Supine to Sit : Minimal Assist  Scooting: Min    BALANCE ASSESSMENT  Static Sitting: -- (SBA-CGA)  Sitting Bilateral: Contact Guard Assist  Static Standing: Moderate Assist (L lean)    FUNCTIONAL ADL ASSESSMENT  LB Dressing Seated: Dependent (socks)      ACTIVITY TOLERANCE: SBP via A line, 140s-160s throughout session. HA accompanies SBP >160 (outside of parameter). SBP  does not sustain >160.                          O2 SATURATIONS       COGNITION  Attention Span:  difficulty dividing attention  Following Commands:  follows one step commands consistently  Awareness of Deficits:  fully aware of deficits  Decreased processing speed  COGNITION ASSESSMENTS       VISION  Pt with c/o dizziness exacerbated by head movement and oculomotor movement. Oculomotor testing deferred. Denies diplopia    PERCEPTION  Overall Perception Status:   to be further assessed    Communication: wfl    Behavioral/Emotional/Social: pleasant and cooperative, supportive family    UPPER EXTREMITY  ROM: within functional limits   Strength: is within functional limits except for the following;  L shoulder 3/5, L elbow 4/5, L grasp weaker than R.   Coordination  Gross motor: impaired LUE  Fine motor: impaired LUE  Sensation: Light touch:  intact      Neurological findings:  Neurological Findings: Coordination - Rapid Alternating Movement;Coordination - Finger Opposition     Coordination - Rapid Alternating Movement: Left decreased speed;Left decreased accuracy  Coordination - Finger Opposition: Not tested       EDUCATION PROVIDED  Patient: Role of Occupational Therapy; Plan of Care; Discharge Recommendations; Functional Transfer Techniques; Fall Prevention; Compensatory ADL Techniques; UE HEP for ROM; Proper Body Mechanics  Patient's Response to Education: -- (jarred)  Family/Caregiver: -- (same)  Family/Caregiver's Response to Education: Verbalized Understanding    Equipment used: RW  Demonstrates functional use, Would benefit from additional trial      Therapist comments: Extended time needed for rest breaks during all activity secondary to nausea and pain. Despite this pt remains very motivated to continue session.     Min to EOB. Occasional CGA EOB. Frequently closes eyes due to dizziness. Unable to tolerate oculomotor testing. Assist to don socks. Sit to stand mod of 2 via RW, completed 2 trials. L side lean with  mod (A) standing balance. On second standing trial, able to complete chair transfer with mod of 2. Provided education RE neuroplasticity and positioning.     Pt/family discussing stressful workplace safety situation and inappropriate response of pt's employers. Support provided, as well as redirection when conversation impacted pt's BP.     Pt/family with questions RE pt's acute back pain; relayed concerns to RN.    Patient End of Session: Up in chair;Needs met;Call light within reach;RN aware of session/findings;All patient questions and concerns addressed;Alarm set;Family present    OCCUPATIONAL PROFILE    HOME SITUATION  Type of Home: Apartment  Home Layout: Able to live on main level (bi level, kids' bedrooms/bathroom is upstairs, pt's bedroom/bathroom is on main level. option of elevator or spiral staircase to get upstairs)  Lives With: Spouse;Family (4 children ages 17 to 7 years old)    Toilet and Equipment: Standard height toilet  Shower/Tub and Equipment: Tub-shower combo       Occupation/Status: CNA  Hand Dominance: Right  Drives: No  Patient Regularly Uses: Glasses (doesn't wear)    Prior Level of Function: Pt reports independence with ADL/IADL and ambulation without AD prior to admit.      SUBJECTIVE   Pt reports her back pain started after her lumbar puncture.     PAIN ASSESSMENT  Rating: Unable to rate  Location: low back >RLE, head, neck  Management Techniques: Activity promotion;Relaxation;Repositioning;Nurse notified    OBJECTIVE  Precautions:  (-160)  Fall Risk: High fall risk      ASSESSMENTS  AM-PAC ‘6-Clicks’ Inpatient Daily Activity Short Form  -   Putting on and taking off regular lower body clothing?: Total  -   Bathing (including washing, rinsing, drying)?: A Lot  -   Toileting, which includes using toilet, bedpan or urinal? : A Lot  -   Putting on and taking off regular upper body clothing?: A Lot  -   Taking care of personal grooming such as brushing teeth?: A Little  -   Eating  meals?: A Little    AM-PAC Score:  Score: 13  Approx Degree of Impairment: 63.03%  Standardized Score (AM-PAC Scale): 32.03    ADDITIONAL TESTS     NEUROLOGICAL FINDINGS  Coordination - Rapid Alternating Movement: Left decreased speed; Left decreased accuracy  Coordination - Finger Opposition: Not tested       PLAN  OT Treatment Plan: Balance activities;ADL training;Functional transfer training;Visual perceptual training;UE strengthening/ROM;Endurance training;Cognitive reorientation;Patient/Family education;Patient/Family training;Equipment eval/education;Compensatory technique education;Fine motor coordination activities;Neuromuscluar reeducation  Rehab Potential : Good  Frequency: 3-5x/week  Number of Visits to Meet Established Goals: 8    ADL Goals   Patient will perform grooming: with supervision  Patient will perform lower body dressing:  with min assist  Patient will perform toileting: with min assist    Functional Transfer Goals  Patient will transfer from sit to stand:  with min assist  Patient will transfer to toilet:  with min assist    UE Exercise Program Goal  Patient will be supervision with left AROM HEP (home exercise program).    Additional Goals  Pt will stand 2 minutes CGA for ADL task    Therapist Goals  Complete visual perceptual screening, as tolerated  Complete PhQ9    Patient Evaluation Complexity Level:   Occupational Profile/Medical History LOW - Brief history including review of medical or therapy records    Specific performance deficits impacting engagement in ADL/IADL LOW  1 - 3 performance deficits    Client Assessment/Performance Deficits LOW - No comorbidities nor modifications of tasks    Clinical Decision Making LOW - Analysis of occupational profile, problem-focused assessments, limited treatment options    Overall Complexity LOW     OT Session Time: 60 minutes  Self-Care Home Management:  minutes  Therapeutic Activity: 30 minutes  Neuromuscular Re-education:  minutes  Therapeutic  Exercise:  minutes  Cognitive Skills:  minutes  Sensory Integrative:  minutes  Orthotic Management and Training:  minutes  Can add/delete any of these

## 2024-08-11 NOTE — PROCEDURES
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    Post-Op Diagnosis: s/p coil embolization    Surgeon: Lan Collins MD, PhD    Technique/Findings:    6F Right Radial Artery Glidesheath Slender  6F Benchmark 071 / 5F Flores 130cm Select    SL-10  / Synchro Select .014 in soft  Scepter 4 x 11 mm XC balloon / Synchro Select .014 in standard  Hydroframe 4mm x 5cm and Hydrosoft 3mm x 6cm    Successful coil embolization of supraclinoid right ICA terminus / A1 right JARED aneurysm resulting in  near complete occlusion    TR band compression    Full report to follow      Anesthesia:  General    Complications:  None    Medications:  Ancef 2g IV  Heparin 4000 units IA / 6000 units IV  Verapamil 15mg IA  Nitroglycerin 200 micrograms IA    Blood Loss:  < 50 mL     Assessment/Plan:  39F with PMH smoking, hypertension, pre-diabetes, sickle cell trait, and family history of neurofibromatosis presents with acute onset of severe atypical headache without resolution. Subsequent CTA head/neck and MRI brain imaging identified 4 mm supraclinoid right ICA terminus aneurysm, punctate intracranial hemorrhage foci in the left temporal lobe, and interval development of mild ventriculomegaly/possibly early hydrocephalus.     s/p coil embolization of ruptured vs symptomatic 4mm supraclinoid right ICA terminus / A1 JARED aneurysm resulting in near complete occlusion without complications    Neurologically remains intact -   Neuro ICU monitoring  / Neurochecks q1h    Seizure prophylaxis - levetiracetam  Vasospasm prophylaxis:   Nimodipine  Normotensive SBP goals 120-160 mm Hg  Euvolemia / IVFs, assess for cerebral salt wasting vs SIADH  Daily TCDs     ASA 325mg antiplatelet loading / ASA 81 mg  antiplatelet therapy to prevent any subacute thromboembolic complications   Plan for MRA brain study day 7 to assess for early coil compaction/aneurysm recanalization and cerebral vasospasm    Discussed with Nhan Winters / Neurocritical Care and [place]/Neurosurgery        Lan Collins MD, PhD  8/10/2024  9:25 PM

## 2024-08-11 NOTE — PROGRESS NOTES
[facility]   part of [facility] Infectious Disease Consult    Greg Uriarte Patient Status:  Inpatient    1985 MRN [id number]   Location [facility] 6NE-A Attending Cheryle Martinez MD   Hosp Day # 1 PCP MD Greg Friedman seen and examined,   Sitting up,   Feels much better,   Mild headache,   No photophobia,   Family at the bed side,       History:  Past Medical History:    Anemia    Anxiety and depression    Essential hypertension    Gestational diabetes (HCC)    diet    Prediabetes    A1c 6.2    Sickle cell trait (HCC)     Past Surgical History:   Procedure Laterality Date    Anesth, section      x 3     Family History   Problem Relation Age of Onset    Diabetes Mother     High Blood Pressure Mother     Other (heart murmur) Mother     Other (sarcoma) Brother     Other (neurofibromatosis) Brother     Depression Brother     Anxiety Brother       reports that she has been smoking cigarettes. She has never used smokeless tobacco. She reports that she does not drink alcohol and does not use drugs.    Allergies:  Allergies   Allergen Reactions    Amoxicillin RASH    Latex HIVES    Banana ITCHING       Medications:    Current Facility-Administered Medications:     dexamethasone (Decadron) 4 MG/ML injection 4 mg, 4 mg, Intravenous, Q6H **FOLLOWED BY** [START ON 2024] dexamethasone (Decadron) 4 MG/ML injection 4 mg, 4 mg, Intravenous, Q8H    acyclovir (Zovirax) 600 mg in sodium chloride 0.9% 100 mL IVPB, 10 mg/kg (Ideal), Intravenous, Q8H    labetalol (Trandate) 5 mg/mL injection 10 mg, 10 mg, Intravenous, Q10 Min PRN    hydrALAzine (Apresoline) 20 mg/mL injection 10 mg, 10 mg, Intravenous, Q2H PRN    acetaminophen (Tylenol) tab 650 mg, 650 mg, Oral, Q4H PRN **OR** acetaminophen (Tylenol) rectal suppository 650 mg, 650 mg, Rectal, Q4H PRN    [DISCONTINUED] ondansetron (Zofran) 4 MG/2ML injection 4 mg, 4 mg, Intravenous, Q6H PRN **OR** metoclopramide (Reglan) 5  mg/mL injection 10 mg, 10 mg, Intravenous, Q8H PRN    levETIRAcetam (Keppra) 500 mg/5mL injection 500 mg, 500 mg, Intravenous, q12h    niMODipine (Nimotop) cap 60 mg, 60 mg, Oral, 6 times per day    midazolam (Versed) 2 MG/2ML injection 2 mg, 2 mg, Intravenous, Q15 Min PRN    acetaminophen (Ofirmev) 10 mg/mL infusion premix 1,000 mg, 1,000 mg, Intravenous, Q6H PRN    cefTRIAXone (Rocephin) 2 g in sodium chloride 0.9% 100 mL IVPB-ADDV, 2 g, Intravenous, q12h    Vancomycin: PHARMACY DOSING, 1 each, Intravenous, See Admin Instructions (RX holding)    vancomycin (Vancocin) 1.5 g in sodium chloride 0.9% 250mL IVPB premix, 1,500 mg, Intravenous, Q12H    lactated ringers infusion, , Intravenous, Continuous    morphINE PF 2 MG/ML injection 1 mg, 1 mg, Intravenous, Q2H PRN **OR** morphINE PF 2 MG/ML injection 2 mg, 2 mg, Intravenous, Q2H PRN    ondansetron (Zofran) 4 MG/2ML injection 4 mg, 4 mg, Intravenous, Q6H PRN    niCARdipine in sodium chloride 0.86% (carDENE) 20 mg/200mL infusion premix, 5-15 mg/hr, Intravenous, Continuous PRN    aspirin chewable tab 81 mg, 81 mg, Oral, Daily    Review of Systems:   Constitutional: Negative for anorexia, chills, fatigue, fevers, malaise, night sweats and weight loss.  Eyes: Negative for visual disturbance, irritation and redness.  Ears, nose, mouth, throat, and face: Negative for hearing loss, tinnitus, nasal congestion, snoring, sore throat, hoarseness and voice change.  Respiratory: Negative for cough, sputum, hemoptysis, chest pain, wheezing, dyspnea on exertion, or stridor.  Cardiovascular: Negative for chest pain, palpitations, irregular heart beats, syncope, fatigue, orthopnea, paroxysmal nocturnal dyspnea, lower extremity edema.  Gastrointestinal: Negative for dysphagia, odynophagia, reflux symptoms, nausea, vomiting, change in bowel habits, diarrhea, constipation and abdominal pain.  Integument/breast: Negative for rash, skin lesions, and pruritus.  Hematologic/lymphatic:  Negative for easy bruising, bleeding, and lymphadenopathy.  Musculoskeletal: Negative for myalgias, arthralgias, muscle weakness.  Neurological: Negative for headaches, dizziness, seizures, memory problems, trouble swallowing, speech problems, gait problems and weakness.  Behavioral/Psych: Negative for active tobacco use.  Endocrine: No history of of diabetes, thyroid disorder.      Vital signs in last 24 hours:  Patient Vitals for the past 24 hrs:   BP Temp Temp src Pulse Resp SpO2 Weight   08/10/24 1130 146/76 -- -- 78 18 -- --   08/10/24 1100 (!) 142/91 -- -- 98 21 -- --   08/10/24 1030 140/79 -- -- 80 11 -- --   08/10/24 1015 133/81 -- -- 74 15 -- --   08/10/24 1000 135/86 -- -- 74 20 -- --   08/10/24 0945 144/82 -- -- 81 14 -- --   08/10/24 0935 150/87 98.1 °F (36.7 °C) Temporal 82 14 95 % --   08/10/24 0834 135/73 -- -- 83 16 99 % --   08/10/24 0814 126/70 -- -- 95 16 -- --   08/10/24 0804 146/76 -- -- -- -- -- --   08/10/24 0517 141/77 -- -- 106 19 100 % --   08/09/24 2355 151/85 -- -- 89 19 98 % --   08/09/24 2148 128/78 -- -- -- -- -- --   08/09/24 2054 120/75 -- -- -- -- -- --   08/09/24 2053 -- 97 °F (36.1 °C) Temporal 101 16 100 % 213 lb 13.5 oz (97 kg)       Physical Exam:   General: alert, cooperative, oriented.  No respiratory distress.   Head: Normocephalic, without obvious abnormality, atraumatic.   Eyes: Conjunctivae/corneas clear.  No scleral icterus.  No conjunctival     hemorrhage.   Nose: Nares normal.   Throat: Lips, mucosa, and tongue normal.  No thrush noted.   Neck: painful movement of neck,trachea midline, no adenopathy, no thyromegaly.   Lungs: CTAB, normal and equal bilateral chest rise   Chest wall: No tenderness or deformity.   Heart: Regular rate and rhythm, normal S1S2, no murmur.   Abdomen: soft, non-tender, non-distended, no masses, no guarding, no     rebound, positive BS.   Extremity: no edema, no cyanosis   Skin: No rashes or lesions.   Neurological: Alert, interactive, no focal  deficits    Labs:  Lab Results   Component Value Date    WBC 9.3 08/11/2024    HGB 9.0 08/11/2024    HCT 29.9 08/11/2024    .0 08/11/2024    CREATSERUM 0.62 08/11/2024    BUN <5 08/11/2024     08/11/2024    K 3.4 08/11/2024     08/11/2024    CO2 22.0 08/11/2024     08/11/2024    CA 8.9 08/11/2024    PTT 25.3 08/10/2024    INR 1.06 08/10/2024    MG 1.9 08/11/2024       Radiology:  Reviewed,       Cultures:  Reviewed,     Assessment and Plan:    1.  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  - CTA of brain with R post communicating artery aneurysm,   - S/P Coil embolization of R ICA/JARED aneurysm on 8/10.   - Continue IV Vanc, Ceftriaxone and Acyclovir, 8/10- present, follow pending cul and MEP,      2.    Leukocytosis; sec to above,     3.   Disposition: in house an middle aged female with severe headache of a day duration with no fever, chills. Bloody LP, imaging with possible aneurysm, clinically appears to be due to aneurysmal leak, will dc IV abx once cul are finalized,   - Follow IR, NS input,   - Follow Pending cul, MEP.  - Continue IV Vanc, Ceftriaxone and Acyclovir,   - Pain management per PCP,     Discussed with spouse, RN, all questions answered, further recommendations to follow, Thanks,   Thank you for consulting [facility] for Greg Uriarte.  If you have any questions or concerns please call [facility] at [phone].     Magdalene Gurrola MD  8/10/2024  12:36 PM

## 2024-08-11 NOTE — PHYSICAL THERAPY NOTE
PHYSICAL THERAPY EVALUATION - INPATIENT     Room Number: 6607/6607-A  Evaluation Date: 8/11/2024  Type of Evaluation: Initial  Physician Order: PT Eval and Treat    Presenting Problem: neck and back pain  Co-Morbidities : HTN, HA, anxiety, depression  Reason for Therapy: Mobility Dysfunction and Discharge Planning    PHYSICAL THERAPY ASSESSMENT   Patient is currently functioning below baseline with bed mobility, transfers, and gait.  Prior to admission, patient's baseline is independent.  Patient is requiring moderate assist and maximum assist as a result of the following impairments: decreased functional strength, decreased endurance/aerobic capacity, pain, impaired static and dynamic balance, decreased muscular endurance, and medical status. Physical Therapy will continue to follow for duration of hospitalization.      Patient will benefit from continued skilled PT Services to facilitate return to prior level of function as patient demonstrates high motivation with excellent tolerance to an intensive therapy program .    PLAN  PT Treatment Plan: Bed mobility;Body mechanics;Endurance;Energy conservation;Patient education;Family education;Gait training;Strengthening;Transfer training;Balance training  Rehab Potential : Good  Frequency (Obs): 3-5x/week  Number of Visits to Meet Established Goals: 8      CURRENT GOALS    Goal #1 Patient is able to demonstrate supine - sit EOB @ level: supervision     Goal #2 Patient is able to demonstrate transfers Sit to/from Stand at assistance level: supervision     Goal #3 Patient is able to ambulate 150 feet with assist device: walker - rolling at assistance level: supervision     Goal #4    Goal #5    Goal #6    Goal Comments: Goals established on 8/11/2024      PHYSICAL THERAPY MEDICAL/SOCIAL HISTORY  History related to current admission: Patient is a 39 year old female admitted on 8/9/2024 from home for neck and back pain.  Pt diagnosed with ruptured supraclinoid R ICA/JARED  aneurysm s/p cord embolization 8/10/24.      HOME SITUATION  Type of Home: Apartment   Home Layout: Two level;Able to live on main level  Stairs to Enter : 0   Stairs to Bedroom: 0   Lives With: Spouse;Family (4 children ages 17 to 7 years old)  Drives: No  Patient Owned Equipment: None  Patient Regularly Uses: Glasses    Prior Level of [place]: Pt reports living in a 1st floor apt with spouse and children. Pt reports she is able to live on main level and kids are on the 2nd floor accessible by a spiral staircase or an elevator if needed to get up to 2nd floor. 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     SUBJECTIVE  \" I want to try but I still have the headache\"      OBJECTIVE  Precautions: None  Fall Risk: High fall risk    WEIGHT BEARING RESTRICTION  Weight Bearing Restriction: None          PAIN ASSESSMENT  Rating: Unable to rate  Location: head, back and R LE  Management Techniques: Activity promotion;Body mechanics;Breathing techniques;Relaxation;Repositioning    COGNITION  Overall Cognitive Status:  WFL - within functional limits    RANGE OF MOTION AND STRENGTH ASSESSMENT  Upper extremity ROM and strength - see OT eval for details    Lower extremity ROM is within functional limits     Lower extremity strength is within functional limits       BALANCE  Static Sitting: Fair  Dynamic Sitting: Fair -  Static Standing: Fair -  Dynamic Standing: Poor +      ACTIVITY TOLERANCE  Pulse: 96          AM-PAC '6-Clicks' INPATIENT SHORT FORM - BASIC MOBILITY  How much difficulty does the patient currently have...  Patient Difficulty: Turning over in bed (including adjusting bedclothes, sheets and blankets)?: A Little   Patient Difficulty: Sitting down on and standing up from a chair with arms (e.g., wheelchair, bedside commode, etc.): A Lot   Patient Difficulty: Moving from lying on back to sitting on the side of the bed?: A Little   How much help from another person does the patient currently need...    Help from Another: Moving to and from a bed to a chair (including a wheelchair)?: A Lot   Help from Another: Need to walk in hospital room?: A Lot   Help from Another: Climbing 3-5 steps with a railing?: A Lot       AM-PAC Score:  Raw Score: 14   Approx Degree of Impairment: 61.29%   Standardized Score (AM-PAC Scale): 38.1   CMS Modifier (G-Code): CL    FUNCTIONAL ABILITY STATUS  Gait Assessment   Functional Mobility/Gait Assessment  Gait Assistance: Maximum assistance (mod A x 2)  Distance (ft): 2  Assistive Device: None (HHA)  Pattern: Shuffle;Comment (decreased heel-toe pattern, decreased stride length)    Skilled Therapy Provided     Bed Mobility:  Rolling: min A  Supine to sit: min A   Sit to supine: not tested     Transfer Mobility:  Sit to stand: mod A x 2   Stand to sit: mod A x 2  Gait = mod A x 2 / HHA    Therapist's Comments: RN cleared pt for session. Pt received elevated supine in bed, pt and family in room agreeable to session. Pt performed above functional mobility with increased time required to complete. Pt required increased time to rest in between transitional movements. Pt tolerated sitting EOB approx 35 mins with CGA to SBA. Pt with continued c/o headache, stabbing pain on L eye, burning pain on R LE and tightness on L LE upon standing. Pt performed x 2 sit to stand trials and on the 2nd trial, pt was able to ambulate from bed to chair. Educated pt on roles of PT, goals of care, to sit up on chair for meals and to perform LE exercises while up in chair and in bed. Pt and family verbalized understanding and agreement to all teaching. RN made aware of session and recommendations.     Exercise/Education Provided:  Bed mobility  Body mechanics  Energy conservation  Functional activity tolerated  Gait training  Posture  Lower therapeutic exercise:  Alternating marching  Ankle pumps  LAQ  Transfer training    Patient End of Session: Up in chair;Needs met;Call light within reach;RN aware of  session/findings;All patient questions and concerns addressed;Alarm set;Family present      Patient Evaluation Complexity Level:  History High - 3 or more personal factors and/or co-morbidities   Examination of body systems High - addressing a total of 4 or more elements   Clinical Presentation  Moderate - Evolving   Clinical Decision Making High Complexity       PT Session Time: 60 minutes  Gait Training:  minutes  Therapeutic Activity: 45 minutes  Neuromuscular Re-education:  minutes  Therapeutic Exercise:  minutes

## 2024-08-12 ENCOUNTER — APPOINTMENT (OUTPATIENT)
Dept: ULTRASOUND IMAGING | Facility: HOSPITAL | Age: 39
End: 2024-08-12
Attending: INTERNAL MEDICINE
Payer: MEDICAID

## 2024-08-12 LAB
ANION GAP SERPL CALC-SCNC: 7 MMOL/L (ref 0–18)
BUN BLD-MCNC: 9 MG/DL (ref 9–23)
CALCIUM BLD-MCNC: 9.9 MG/DL (ref 8.7–10.4)
CHLORIDE SERPL-SCNC: 110 MMOL/L (ref 98–112)
CO2 SERPL-SCNC: 23 MMOL/L (ref 21–32)
CREAT BLD-MCNC: 0.57 MG/DL
EGFRCR SERPLBLD CKD-EPI 2021: 120 ML/MIN/1.73M2 (ref 60–?)
ERYTHROCYTE [DISTWIDTH] IN BLOOD BY AUTOMATED COUNT: 18.6 %
GLUCOSE BLD-MCNC: 131 MG/DL (ref 70–99)
GLUCOSE BLD-MCNC: 156 MG/DL (ref 70–99)
GLUCOSE BLD-MCNC: 174 MG/DL (ref 70–99)
GLUCOSE BLD-MCNC: 192 MG/DL (ref 70–99)
GLUCOSE BLD-MCNC: 193 MG/DL (ref 70–99)
GLUCOSE BLD-MCNC: 215 MG/DL (ref 70–99)
GLUCOSE BLD-MCNC: 511 MG/DL (ref 70–99)
HCT VFR BLD AUTO: 30.7 %
HGB BLD-MCNC: 9.2 G/DL
MAGNESIUM SERPL-MCNC: 2 MG/DL (ref 1.6–2.6)
MCH RBC QN AUTO: 21.9 PG (ref 26–34)
MCHC RBC AUTO-ENTMCNC: 30 G/DL (ref 31–37)
MCV RBC AUTO: 72.9 FL
OSMOLALITY SERPL CALC.SUM OF ELEC: 292 MOSM/KG (ref 275–295)
PLATELET # BLD AUTO: 339 10(3)UL (ref 150–450)
POTASSIUM SERPL-SCNC: 4.2 MMOL/L (ref 3.5–5.1)
POTASSIUM SERPL-SCNC: 4.2 MMOL/L (ref 3.5–5.1)
RBC # BLD AUTO: 4.21 X10(6)UL
SODIUM SERPL-SCNC: 140 MMOL/L (ref 136–145)
WBC # BLD AUTO: 13.4 X10(3) UL (ref 4–11)

## 2024-08-12 PROCEDURE — 99291 CRITICAL CARE FIRST HOUR: CPT | Performed by: INTERNAL MEDICINE

## 2024-08-12 PROCEDURE — 93886 INTRACRANIAL COMPLETE STUDY: CPT | Performed by: INTERNAL MEDICINE

## 2024-08-12 PROCEDURE — 99231 SBSQ HOSP IP/OBS SF/LOW 25: CPT | Performed by: NEUROLOGICAL SURGERY

## 2024-08-12 RX ORDER — HYDROCODONE BITARTRATE AND ACETAMINOPHEN 10; 325 MG/1; MG/1
1 TABLET ORAL EVERY 6 HOURS PRN
Status: DISCONTINUED | OUTPATIENT
Start: 2024-08-12 | End: 2024-08-13

## 2024-08-12 RX ORDER — BISACODYL 10 MG
10 SUPPOSITORY, RECTAL RECTAL
Status: DISCONTINUED | OUTPATIENT
Start: 2024-08-12 | End: 2024-08-23

## 2024-08-12 RX ORDER — SODIUM PHOSPHATE, DIBASIC AND SODIUM PHOSPHATE, MONOBASIC 7; 19 G/230ML; G/230ML
1 ENEMA RECTAL ONCE AS NEEDED
Status: DISCONTINUED | OUTPATIENT
Start: 2024-08-12 | End: 2024-08-23

## 2024-08-12 RX ORDER — POLYETHYLENE GLYCOL 3350 17 G/17G
17 POWDER, FOR SOLUTION ORAL DAILY PRN
Status: DISCONTINUED | OUTPATIENT
Start: 2024-08-12 | End: 2024-08-23

## 2024-08-12 RX ORDER — ENOXAPARIN SODIUM 100 MG/ML
40 INJECTION SUBCUTANEOUS DAILY
Status: DISCONTINUED | OUTPATIENT
Start: 2024-08-12 | End: 2024-08-23

## 2024-08-12 RX ORDER — HYDROCODONE BITARTRATE AND ACETAMINOPHEN 10; 325 MG/1; MG/1
2 TABLET ORAL EVERY 6 HOURS PRN
Status: DISCONTINUED | OUTPATIENT
Start: 2024-08-12 | End: 2024-08-13

## 2024-08-12 RX ORDER — DOCUSATE SODIUM 100 MG/1
100 CAPSULE, LIQUID FILLED ORAL 2 TIMES DAILY
Status: DISCONTINUED | OUTPATIENT
Start: 2024-08-12 | End: 2024-08-23

## 2024-08-12 NOTE — PROGRESS NOTES
Infectious Disease Progress Note      Date of admission: 8/9/2024  9:38 PM     Reason for consult: Question of CNS infection    Subjective: Patient is feeling better.  Headache and neck pain are improving.  No nausea or vomiting.  No diarrhea.    The rest of the systems were reviewed and found to be negative except was mentioned above    Interval events: This is a 39-year-old female patient, admitted here with severe headaches along with grogginess, now found to have a ruptured supraclinoid right ICA/JARED aneurysm, status post: Localization  Status post lumbar puncture, cultures with no growth to date.  ME panel was not sent.    Medications:    insulin aspart    enoxaparin    HYDROcodone-acetaminophen **OR** HYDROcodone-acetaminophen    docusate sodium    polyethylene glycol (PEG 3350)    magnesium hydroxide    bisacodyl    fleet enema    [COMPLETED] dexamethasone **FOLLOWED BY** dexamethasone    acyclovir    labetalol    hydrALAzine    [DISCONTINUED] ondansetron **OR** metoclopramide    niMODipine    midazolam    cefTRIAXone    vancomycin    lactated ringers    morphINE **OR** morphINE    ondansetron    niCARdipine    aspirin     Allergies:  Allergies   Allergen Reactions    Amoxicillin RASH    Latex HIVES    Banana ITCHING       Physical Exam:  Vitals:    08/12/24 1000   BP: 140/73   Pulse: 96   Resp: 20   Temp:      Vitals signs and nursing note reviewed.   Constitutional:       Appearance: Normal appearance.   HENT:      Head: Normocephalic and atraumatic.      Mouth: Mucous membranes are moist.   Neck:      Musculoskeletal: Neck supple.   Cardiovascular:      Rate and Rhythm: Normal rate.      Heart sounds: Normal heart sounds. No murmur. No friction rub. No gallop.    Pulmonary:      Effort: Pulmonary effort is normal. No respiratory distress.      Breath sounds: Normal breath sounds. No stridor. No wheezing, rhonchi or rales.   Chest:      Chest wall: No tenderness.   Abdominal:      General: Abdomen is  flat. There is no distension.      Palpations: Abdomen is soft. There is no mass.      Tenderness: There is no tenderness. There is no guarding or rebound.      Hernia: No hernia is present.   Musculoskeletal:      Right lower leg: No edema.      Left lower leg: No edema.   Skin:     General: Skin is warm and dry.   Neurological:      General: No focal deficit present.      Mental Status: Alert and oriented to person, place, and time.       Laboratory data:  I have reviewed all the lab results independently.  Lab Results   Component Value Date    WBC 13.4 08/12/2024    HGB 9.2 08/12/2024    HCT 30.7 08/12/2024    .0 08/12/2024    CREATSERUM 0.57 08/12/2024    BUN 9 08/12/2024     08/12/2024    K 4.2 08/12/2024    K 4.2 08/12/2024     08/12/2024    CO2 23.0 08/12/2024     08/12/2024    CA 9.9 08/12/2024    MG 2.0 08/12/2024      Recent Labs   Lab 08/09/24  2156 08/11/24  0415 08/12/24  0512   RBC 4.45   < > 4.21   HGB 9.7*   < > 9.2*   HCT 32.4*   < > 30.7*   MCV 72.8*   < > 72.9*   MCH 21.8*   < > 21.9*   MCHC 29.9*   < > 30.0*   RDW 18.2   < > 18.6   NEPRELIM 9.71*  --   --    WBC 13.1*   < > 13.4*   .0   < > 339.0    < > = values in this interval not displayed.      Microbiology data:  Hospital Encounter on 08/09/24   1. CSF culture     Status: None (Preliminary result)    Collection Time: 08/10/24  6:13 AM    Specimen: CSF, lumbar puncture; Cerebral spinal fluid   Result Value Ref Range    CSF Culture Result No Growth 2 Days N/A    CSF Smear 3+ WBCs seen N/A    CSF Smear No organisms seen N/A    CSF Smear This is a cytocentrifuged smear. N/A      Impression:  Greg Uriarte is a 39 year old female with    Severe headaches in the setting of a ruptured supraclinoid right ICA/JARED aneurysm  Status post embolization  Unlikely infectious process  CSF cultures with no growth to date  Her pleocytosis could be explained by the bleed  ME panel was not sent; however, unlikely viral  encephalitis      Recommendations:    Discontinue IV antibiotics at this point  Management of bleed as per neurosurgery  ID will sign off, and follow peripherally, please call us with any questions or changes status.  Thank you for this consultation.    The plan of care was discussed with the primary hospital team, Cheryle Martinez MD     Recommendations were also discussed with the patient; all questions were answered.     Thank you for this consultation. Please don't hesitate to call the ID team for questions or any acute changes in patient's clinical condition.    Please note that this report has been produced using speech recognition software and may contain errors related to that system including, but not limited to, errors in grammar, punctuation, and spelling, as well as words and phrases that possibly may have been recognized inappropriately.  If there are any questions or concerns, contact the dictating provider for clarification.    The 21st Century Cures Act makes medical notes like these available to patients in the interest of transparency. Please be advised this is a medical document. Medical documents are intended to carry relevant information, facts as evident, and the clinical opinion of the practitioner. The medical note is intended as peer to peer communication and may appear blunt or direct. It is written in medical language and may contain abbreviations or verbiage that are unfamiliar.     Marquis Chan MD  [facility]. Tel: [phone]. Fax: [phone].     Greg ROBERTS Chapito : 1985 MRN: [id number] CSN: [id number]

## 2024-08-12 NOTE — DISCHARGE PLANNING
Patient follow-up appointment information:     Visit Type: Stroke follow up  Date of TIA/Stroke: 8/9/2024  Type of Stroke: Hemorrhagic  Patient to follow-up: 3 months  OP Neurologist: N/A  New patient to neurology service: yes  Anticipated discharge (if known): TBD  Discharge disposition (if known): Acute rehab      RN Stroke Navigator team  [phone]

## 2024-08-12 NOTE — OCCUPATIONAL THERAPY NOTE
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    Patient continues to function near baseline with toileting, bathing, lower body dressing, and dynamic standing balance.   Contributing factors to remaining limitations include decreased endurance.  Next session anticipate patient to progress toileting, upper body dressing, and lower body dressing.  Occupational Therapy will continue to follow patient for duration of hospitalization.    Patient continues to benefit from continued skilled OT services: at discharge to promote prior level of function and safety with additional support and return home with home health OT. Changing anticipated therapy needs from intense to home with home health. Updated SW.            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 Co-Morbidities : HTN, HA, anxiety, depression     WEIGHT BEARING RESTRICTION  Weight Bearing Restriction: None                Recommendations for nursing staff:   Transfers: min A  Toileting location: toilet    TREATMENT SESSION:  Patient Start of Session: supine  FUNCTIONAL TRANSFER ASSESSMENT  Sit to Stand: Edge of Bed  Edge of Bed: Minimal Assist    BED MOBILITY  Supine to Sit : Minimal Assist  Scooting: Min    EDUCATION PROVIDED  Patient: Role of Occupational Therapy; Plan of Care; Fall Prevention; Functional Transfer Techniques; Posture/Positioning  Patient's Response to Education: Verbalized Understanding  Family/Caregiver: -- (same)  Family/Caregiver's Response to Education: Verbalized Understanding      Equipment used: rw    Therapist comments: pleasant, supportive  present. Supine to sit cga/min A.   SBP within the parameter, 145/83.   Room  air 99%, HR 83.   Pt reported pain from buttock to posterior bilateral thighs.   Min A to stand with RW. Cueing for hand placement for safe transfer sequencing. This transfer was completed in preparation for toilet transfer. Refer to PT note about gait.         Patient End of Session: Up in chair;Needs met;Call light within reach;With  staff;RN aware of session/findings;All patient questions and concerns addressed;Family present    PAIN ASSESSMENT  Rating: Unable to rate  Location: B buttock area to posterior thighs  Management Techniques: Repositioning;Body mechanics     OBJECTIVE  Precautions: Other (Comment) (-180)    AM-PAC ‘6-Clicks’ Inpatient Daily Activity Short Form  -   Putting on and taking off regular lower body clothing?: A Little  -   Bathing (including washing, rinsing, drying)?: A Little  -   Toileting, which includes using toilet, bedpan or urinal? : A Little  -   Putting on and taking off regular upper body clothing?: A Little  -   Taking care of personal grooming such as brushing teeth?: None  -   Eating meals?: None    AM-PAC Score:  Score: 20  Approx Degree of Impairment: 38.32%  Standardized Score (AM-PAC Scale): 42.03    PLAN  OT Treatment Plan: Balance activities;ADL training;Functional transfer training;Visual perceptual training;UE strengthening/ROM;Endurance training;Cognitive reorientation;Patient/Family education;Patient/Family training;Equipment eval/education;Compensatory technique education;Fine motor coordination activities;Neuromuscluar reeducation  Rehab Potential : Good  Frequency: 3-5x/week    OT Goals:   Progressing 8/12  ADL Goals   Patient will perform grooming: with supervision  Patient will perform lower body dressing:  with min assist  Patient will perform toileting: with min assist     Functional Transfer Goals  Patient will transfer from sit to stand:  with min assist  Patient will transfer to toilet:  with min assist     UE Exercise Program Goal  Patient will be  supervision with left AROM HEP (home exercise program).     Additional Goals  Pt will stand 2 minutes CGA for ADL task     Therapist Goals  Complete visual perceptual screening, as tolerated  Complete PhQ9    OT Session Time: 17 minutes  Self-Care Home Management: 10 minutes

## 2024-08-12 NOTE — PROGRESS NOTES
[facility] Hospitalist note    PCP: Leana Gardner MD    Chief Complaint:  F/u headache    SUBJECTIVE:  Headache intermittent, quite uncomfortable at times. Seems overall better, more alert, speech fluent. Eating fine. No nausea.     OBJECTIVE:  Temp:  [97.1 °F (36.2 °C)-98 °F (36.7 °C)] 97.5 °F (36.4 °C)  Pulse:  [65-99] 96  Resp:  [11-29] 20  BP: ()/() 140/73  SpO2:  [92 %-100 %] 98 %  AO: (146-155)/(72-87) 155/81    Intake/Output:    Intake/Output Summary (Last 24 hours) at 8/12/2024 1102  Last data filed at 8/12/2024 1000  Gross per 24 hour   Intake 2057.8 ml   Output 2500 ml   Net -442.2 ml       Last 3 Weights   08/11/24 0700 231 lb 14.8 oz (105.2 kg)   08/10/24 1326 213 lb 13.5 oz (97 kg)   08/09/24 2053 213 lb 13.5 oz (97 kg)   05/05/24 1806 212 lb (96.2 kg)   02/11/23 1826 230 lb (104.3 kg)       Exam  Gen: No acute distress  HEENT: anicteric sclera, MMM  Pulm: Lungs clear, normal respiratory effort  CV: Heart with regular rate and rhythm, no peripheral edema  Abd: Abdomen soft, nontender, nondistended, no organomegaly, bowel sounds present  Skin: no rashes or lesions    Data Review:         Labs:     Recent Labs   Lab 08/09/24  2156 08/10/24  1640 08/11/24 0415 08/12/24  0512   WBC 13.1*  --  9.3 13.4*   HGB 9.7*  --  9.0* 9.2*   MCV 72.8*  --  71.7* 72.9*   .0  --  313.0 339.0   NE 9.71*  --   --   --    LYMABS 2.57  --   --   --    INR  --  1.06  --   --        Recent Labs   Lab 08/09/24 2156 08/11/24  0415 08/12/24  0512    143 140   K 3.2* 3.4* 4.2  4.2    114* 110   CO2 22.0 22.0 23.0   BUN 9 <5* 9   CREATSERUM 0.61 0.62 0.57   CA 9.5 8.9 9.9   MG  --  1.9 2.0   * 109* 156*       Recent Labs   Lab 08/09/24  2156   ALT 14   AST 13   ALB 4.8       No results for input(s): \"TROP\", \"CK\", \"PBNP\", \"PCT\" in the last 168 hours.    No results for input(s): \"CRP\", \"EVER\", \"LDH\", \"DDIMER\" in the last 168 hours.    Recent Labs   Lab 08/11/24  1226 08/11/24  1743 08/12/24  0009  08/12/24  0011 08/12/24  0552   PGLU 168* 127* 511* 174* 192*       Meds:   Scheduled Medication:   insulin aspart  1-5 Units Subcutaneous TID AC and HS    enoxaparin  40 mg Subcutaneous Daily    dexamethasone  4 mg Intravenous Q8H    acyclovir  10 mg/kg (Ideal) Intravenous Q8H    niMODipine  60 mg Oral 6 times per day    cefTRIAXone  2 g Intravenous q12h    vancomycin  1,500 mg Intravenous Q12H    aspirin  81 mg Oral Daily     Continuous Infusing Medication:   lactated ringers 100 mL/hr at 08/11/24 0837    niCARdipine Stopped (08/11/24 0000)     PRN Medication:  HYDROcodone-acetaminophen **OR** HYDROcodone-acetaminophen    labetalol    hydrALAzine    [DISCONTINUED] ondansetron **OR** metoclopramide    midazolam    morphINE **OR** morphINE    ondansetron    niCARdipine       Microbiology:    Hospital Encounter on 08/09/24   1. CSF culture     Status: None (Preliminary result)    Collection Time: 08/10/24  6:13 AM    Specimen: CSF, lumbar puncture; Cerebral spinal fluid   Result Value Ref Range    CSF Culture Result No Growth 2 Days N/A    CSF Smear 3+ WBCs seen N/A    CSF Smear No organisms seen N/A    CSF Smear This is a cytocentrifuged smear. N/A       Lab Results   Component Value Date    COVID19 Not Detected 08/09/2024    COVID19 Not Detected 02/11/2023    COVID19 DETECTED (A) 01/05/2022        Assessment/Plan:       40 yo woman with h/o htn and migraines who presented with severe HA with sensation of hot liquid down back of head. In course of ED w/u, diagnoses of meningitis, sah/aneurysm considered.     1) headache- 2/2 R ICA/JARED aneurysm rupture, SAH now s/p coil embolization. Meningitis was also considered  - repeat head ct neg and ultrasounds without evidence of vasospasm  - on nimodipine, keppra  - seems to be clinically improving.  - neurology, neurosurgery, neuroIR, ID all on consult  - csf culture neg, defer to ID re: continuing abx.  - switching over to norco prn for pain to try to minimize narcotics.  Added bowel regimen as well.      2) HTN-  per chart but does not appear to be on meds for this. Bp parameters per neuro ICU. Nicardipine gtt.     SCDs, loveviolettax           Cheryle Martinez MD  [facility] Hospitalist  Pager: [phone]

## 2024-08-12 NOTE — PROGRESS NOTES
[facility]  Neurosurgery Progress Note    Greg Uriarte Patient Status:  Inpatient    1985 MRN [id number]   Location 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 Has received IV morphine and dilaudid which provide only temporary relief. She denies  dizziness, visual disturbance, changes in speech, numbness, paresthesias, or focal weakness. Denies neck pain, nausea, or vomiting.  No family at bedside.     OBJECTIVE/PHYSICAL EXAM     VITALS:  /78   Pulse 86   Temp 97.5 °F (36.4 °C) (Temporal)   Resp 19   Wt 231 lb 14.8 oz (105.2 kg)   LMP 2024 (Exact Date)   SpO2 100%   BMI 38.59 kg/m²     GENERAL: No acute distress, non-toxic appearing, mood appropriate    HEENT: Normocephalic, atraumatic    RESP:  Respirations non-labored, even    CV:  NSR on tele    NEURO: Alert and oriented x3.  Able to follow commands.  Comprehension intact.  Speech clear, fluent.  Face is symmetric.  PERRLA +3 brisk, EOMI.  CN 2-12 grossly intact.  Sensation to light touch is intact bilaterally. No pronator drift.  Strength 5/5 in all extremities. CHAVEZ x 4. Gait deferred.     LABS     Lab Results   Component Value Date    WBC 13.4 2024    HGB 9.2 2024    HCT 30.7 2024    .0 2024    CREATSERUM 0.57 2024    BUN 9 2024     2024    K 4.2 2024    K 4.2 2024     2024    CO2 23.0 2024     2024    CA 9.9 2024    MG 2.0 2024    PGLU 192 2024       IMAGING     US TRANSCRANIAL ARTERIES COMPLETE  (CPT=93886)    Result Date: 2024  CONCLUSION:  1. No sonographic evidence for vaso spasm.   LOCATION:  [facility]   Dictated by (CST): Teetee Ortiz MD on 2024 at 7:38 AM     Finalized by  (CST): Teetee Ortiz MD on 8/12/2024 at 8:52 AM       CT BRAIN OR HEAD (CPT=70450)    Result Date: 8/11/2024  CONCLUSION:  1. No acute intracranial abnormality.  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 Hoskins.    JOSEPHINE Hadley-NP  [facility]  8/12/2024     A total of 10 minutes of visit time (exclusible of billable procedures) was administered.  >50 % of time spent counseling/coordinating care.  Is this a shared or split note between Advanced Practice Provider and Physician? Yes  Patient seen and examined  Doing well  No headache  Vasospasm watch

## 2024-08-12 NOTE — PLAN OF CARE
Received care of pt circa 1930. Pt resting in bed, a/o x4. Neurologically intact. NSR on monitor. Pt received on RA; lung sounds clear. VSS. Pt c/o moderate to severe right leg and head pain. Pain managed with PRN morphine and Ofrimev, see MAR. Pt voiding via external catheter. Family at bedside. Updated pt on plan of care. Per pt and family, no further questions. Plan of care continues.

## 2024-08-12 NOTE — PROGRESS NOTES
[facility]  Interventional Neuroradiology Progress Note    Greg Uriarte Patient Status:  Inpatient    1985 MRN [id number]   Location [facility] 6NE-A Attending Cheryle Martinez MD   Hosp Day # 2 PCP Leana Gardner MD     CC: Sentinal Headache vs ?SAH  Ventricular enlargement    PPD # 2 s/p coiling of the MILAGRO terminus    Subjective:  Greg Uriarte is a(n) 39 year old female.    Current complaints: No events overnight. Mild HA this AM. No focal deficits.      Objective/Physical Exam:    Vital Signs:  Blood pressure 142/81, pulse 82, temperature 98 °F (36.7 °C), temperature source Temporal, resp. rate 20, weight 231 lb 14.8 oz (105.2 kg), last menstrual period 2024, SpO2 99%, not currently breastfeeding.    Neurologic:   Mental status: Oriented to person, place, and time   Speech: Fluent, no dysarthria  Memory and comprehension: Intact   Cranial Nerves: VFF, PERRL 3mm brisk, EOMI, no nystagmus, facial sensation intact, face symmetric, tongue midline, shoulder shrug equal, remainder CN intact  Motor: No drift, no focal arm or leg weakness  Sensory: Intact to light touch  Coordination: FTN intact  Gait: Deferred      Cardiac: Regular rate and rhythm.    Lungs: Nonlabored      Inpt Meds:   Current Facility-Administered Medications   Medication Dose Route Frequency    insulin aspart (NovoLOG) 100 Units/mL FlexPen 1-5 Units  1-5 Units Subcutaneous TID AC and HS    enoxaparin (Lovenox) 40 MG/0.4ML SUBQ injection 40 mg  40 mg Subcutaneous Daily    HYDROcodone-acetaminophen (Norco)  MG per tab 1 tablet  1 tablet Oral Q6H PRN    Or    HYDROcodone-acetaminophen (Norco)  MG per tab 2 tablet  2 tablet Oral Q6H PRN    docusate sodium (Colace) cap 100 mg  100 mg Oral BID    polyethylene glycol (PEG 3350) (Miralax) 17 g oral packet 17 g  17 g Oral Daily PRN    magnesium hydroxide (Milk of Magnesia) 400 MG/5ML oral suspension 30 mL  30 mL Oral Daily PRN    bisacodyl (Dulcolax) 10 MG rectal suppository  10 mg  10 mg Rectal Daily PRN    fleet enema (Fleet) 7-19 GM/118ML rectal enema 133 mL  1 enema Rectal Once PRN    dexamethasone (Decadron) 4 MG/ML injection 4 mg  4 mg Intravenous Q8H    labetalol (Trandate) 5 mg/mL injection 10 mg  10 mg Intravenous Q10 Min PRN    hydrALAzine (Apresoline) 20 mg/mL injection 10 mg  10 mg Intravenous Q2H PRN    metoclopramide (Reglan) 5 mg/mL injection 10 mg  10 mg Intravenous Q8H PRN    niMODipine (Nimotop) cap 60 mg  60 mg Oral 6 times per day    midazolam (Versed) 2 MG/2ML injection 2 mg  2 mg Intravenous Q15 Min PRN    lactated ringers infusion   Intravenous Continuous    morphINE PF 2 MG/ML injection 1 mg  1 mg Intravenous Q2H PRN    Or    morphINE PF 2 MG/ML injection 2 mg  2 mg Intravenous Q2H PRN    ondansetron (Zofran) 4 MG/2ML injection 4 mg  4 mg Intravenous Q6H PRN    niCARdipine in sodium chloride 0.86% (carDENE) 20 mg/200mL infusion premix  5-15 mg/hr Intravenous Continuous PRN    aspirin chewable tab 81 mg  81 mg Oral Daily       Labs:  Lab Results   Component Value Date    WBC 13.4 08/12/2024    HGB 9.2 08/12/2024    HCT 30.7 08/12/2024    .0 08/12/2024    CREATSERUM 0.57 08/12/2024    BUN 9 08/12/2024     08/12/2024    K 4.2 08/12/2024    K 4.2 08/12/2024     08/12/2024    CO2 23.0 08/12/2024     08/12/2024    CA 9.9 08/12/2024    MG 2.0 08/12/2024    PGLU 131 08/12/2024       Imaging:  US TRANSCRANIAL ARTERIES COMPLETE  (CPT=93886)    Result Date: 8/12/2024  CONCLUSION:  1. No sonographic evidence for vaso spasm.   LOCATION:  [facility]   Dictated by (CST): Teetee Ortiz MD on 8/12/2024 at 7:38 AM     Finalized by (CST): Teetee Ortiz MD on 8/12/2024 at 8:52 AM       CT BRAIN OR HEAD (CPT=70450)    Result Date: 8/11/2024  CONCLUSION:  1. No acute intracranial abnormality.  Right PCOM aneurysm clip is noted    LOCATION:  [facility]   Dictated by (CST): Robert Robles MD on 8/11/2024 at 10:32 AM     Finalized by (CST): Robert Robles MD on 8/11/2024 at  10:36 AM       US TRANSCRANIAL ARTERIES COMPLETE  (CPT=93886)    Result Date: 8/11/2024  CONCLUSION:  No evidence of intracranial vasospasm.   LOCATION:  [facility]   Dictated by (CST): Robert Robles MD on 8/11/2024 at 9:03 AM     Finalized by (CST): Robert Robles MD on 8/11/2024 at 9:04 AM       CT BRAIN OR HEAD (CPT=70450)    Result Date: 8/10/2024  CONCLUSION:  Interval coiling of the right PCOM aneurysm.    LOCATION:  [facility]   Dictated by (CST): Calin Griffith MD on 8/10/2024 at 10:43 PM     Finalized by (CST): Calin Griffith MD on 8/10/2024 at 10:46 PM       CTA BRAIN + CTA CAROTIDS (CPT=70496/03420)    Addendum Date: 8/10/2024    ADDENDUM:  3D reformat and reconstruction images are now available.  Aneurysm arising from the right posterior communicating artery about 5 x 4 mm is present partially obscured by enhancing venous contamination.  This was better identified on a subsequent MRI of the brain performed today, and the patient subsequently underwent embolization of this aneurysm by interventional neuroradiology.  Dictated by (CST): Dereje Moore MD on 8/10/2024 at 10:13 PM     Finalized by (CST): Dereje Moore MD on 8/10/2024 at 10:15 PM             Result Date: 8/10/2024  CONCLUSION:  The CTA appears normal, but the caliber of the lateral and 3rd ventricles has increased compared to the prior.  The patient is young, and the rest of the brain does not appear atrophied, and the ventricular size change does not appear to be secondary to brain volume loss and could reflect developing hydrocephalus.  Furthermore there is suggestion of effacement of the basilar cisterns.  Consider MRI follow-up.  Note is also made of prominent adenoid and tonsillar soft tissues.   LOCATION:  [facility]   Dictated by (CST): Dereje Moore MD on 8/09/2024 at 11:36 PM     Finalized by (CST): Dereje Moore MD on 8/09/2024 at 11:44 PM       MRA BRAIN (BGD=19682)    Result Date: 8/10/2024  CONCLUSION:  1. Findings compatible with a right  posterior communicating artery aneurysm measuring up to 0.5 x 0.4 cm   LOCATION:  [facility]   Dictated by (CST): Robert Robles MD on 8/10/2024 at 3:41 PM     Finalized by (CST): Robert Robles MD on 8/10/2024 at 3:49 PM       MRI BRAIN (W+WO) (CPT=70553)    Result Date: 8/10/2024  CONCLUSION:  1. Right PCOM aneurysm measuring 3 millimeters. 2. No acute intracranial abnormality   LOCATION:  [facility]    Dictated by (CST): Robert Robles MD on 8/10/2024 at 12:57 PM     Finalized by (CST): Robert Robles MD on 8/10/2024 at 1:24 PM           Assessment:  38 yo female with sentinal headache  Ventriculomegaly  Cerebral aneurysm s/p coiling on 8/10/24      Plan:  Medical management per [facility]  BP parameters - SBP <150  Avoid Hyponatremia  Maintain Euvolemia  Vasospasm watch day 4-14  TCDs   Nimotop  Seizure prophylaxis  Therapy as tolerated         Patient care discussed with JOSEPHINE Kwok  [facility] [place]  8/12/2024, 4:49 PM

## 2024-08-12 NOTE — PLAN OF CARE
Worried this am about getting out of bed felt didn't go well yesterday. Headache addressed . Better with Norco feels for the first time she could sleep and get rest . No nausea . Up with PT/OT. Did well some pain when walking to right leg and lower back not like yesterday. See flow sheet for ongoing assessments. Neuro q2 day q 3 @ night .  at bedside updated care plan. Increased activity as tolerated . Pain management .

## 2024-08-12 NOTE — PLAN OF CARE
Stroke booklet given to patient; the following education was provided:     What is stroke  BEFAST - Stroke warning sings and symptoms  How to initiate EMS  Secondary stroke prevention and personalized risk factors: Hypertension  Lifestyle modifications (nutrition and exercise)  Post-stroke recovery and follow-up  Community resources (stroke support group and non-emergent stroke line)    Patient and  present during education, patient and  receptive to teachings. All pertinent questions and concerns were addressed.      RN Stroke Navigator team  [phone]

## 2024-08-12 NOTE — PHYSICAL THERAPY NOTE
PHYSICAL THERAPY TREATMENT NOTE - INPATIENT    Room Number: 6607/6607-A     Session: 1     Number of Visits to Meet Established Goals: 8    Presenting Problem: neck and back pain  Co-Morbidities : HTN, HA, anxiety, depression    ASSESSMENT   Patient demonstrates good  progress this session, goals remain in progress.    Patient continues to function below baseline with bed mobility, transfers, and gait. Contributing factors to remaining limitations include decreased functional strength, pain, impaired standing balance, decreased muscular endurance, and medical status.  Next session anticipate patient to progress bed mobility, transfers, and gait.  Physical Therapy will continue to follow patient for duration of hospitalization.    Patient continues to benefit from continued skilled PT services: at discharge to promote prior level of function and safety with additional support and return home with home health PT.    PLAN  PT Treatment Plan: Bed mobility;Body mechanics;Endurance;Energy conservation;Patient education;Family education;Gait training;Strengthening;Transfer training;Balance training  Rehab Potential : Good  Frequency (Obs): 3-5x/week    CURRENT GOALS     Goal #1 Patient is able to demonstrate supine - sit EOB @ level: supervision      Goal #2 Patient is able to demonstrate transfers Sit to/from Stand at assistance level: supervision      Goal #3 Patient is able to ambulate 150 feet with assist device: walker - rolling at assistance level: supervision      Goal #4     Goal #5     Goal #6     Goal Comments: Goals established on 8/11/2024 8/12/2024 all goals ongoing    History related to current admission: Patient is a 39 year old female admitted on 8/9/2024 from home for neck and back pain.  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        HOME SITUATION  Type of Home: Apartment   Home Layout: Two level;Able to live on main level  Stairs to Enter : 0   Stairs  to Bedroom: 0   Lives With: Spouse;Family (4 children ages 17 to 7 years old)  Drives: No  Patient Owned Equipment: None  Patient Regularly Uses: Glasses     Prior Level of [place]: Pt reports living in a 1st floor apt with spouse and children. Pt reports she is able to live on main level and kids are on the 2nd floor accessible by a spiral staircase or an elevator if needed to get up to 2nd floor. 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     SUBJECTIVE  \"I don't feel like I can trust my legs\"    OBJECTIVE  Precautions: Other (Comment) (-180)    WEIGHT BEARING RESTRICTION  Weight Bearing Restriction: None                PAIN ASSESSMENT   Rating: Unable to rate  Location: No headache today, but did note ongoing pain and tightness through back of bilateral thighs  Management Techniques: Activity promotion;Repositioning    BALANCE                                                                                                                       Static Sitting: Fair  Dynamic Sitting: Fair -           Static Standing: Fair -  Dynamic Standing: Poor +    ACTIVITY TOLERANCE  Pulse: 99  Heart Rate Source: Monitor  Resp: 19  BP: 145/83  BP Location: Left arm  BP Method: Automatic  Patient Position: Sitting    O2 WALK  Oxygen Therapy  SPO2% on Room Air at Rest: 99      AM-PAC '6-Clicks' INPATIENT SHORT FORM - BASIC MOBILITY  How much difficulty does the patient currently have...  Patient Difficulty: Turning over in bed (including adjusting bedclothes, sheets and blankets)?: A Little   Patient Difficulty: Sitting down on and standing up from a chair with arms (e.g., wheelchair, bedside commode, etc.): A Little   Patient Difficulty: Moving from lying on back to sitting on the side of the bed?: A Little   How much help from another person does the patient currently need...   Help from Another: Moving to and from a bed to a chair (including a wheelchair)?: A Little   Help from Another: Need to walk in  hospital room?: A Little   Help from Another: Climbing 3-5 steps with a railing?: A Lot       AM-PAC Score:  Raw Score: 17   Approx Degree of Impairment: 50.57%   Standardized Score (AM-PAC Scale): 42.13   CMS Modifier (G-Code): CK    FUNCTIONAL ABILITY STATUS  Gait Assessment   Functional Mobility/Gait Assessment  Gait Assistance: Minimum assistance  Distance (ft): 50  Assistive Device: Rolling walker  Pattern: Within Functional Limits;Comment (Reliance on ue's to support gait, slow yadiel.)    Skilled Therapy Provided    Bed Mobility:  Rolling: NT   Supine<>Sit: Right side of bed w/ hob elevated - supervision assist.  Once in sitting, pt noted dizziness.  BP taken and was stable compared to previous readings.  Pt given a few minutes to adjust to position change.   Sit<>Supine: NT     Transfer Mobility:  Sit<>Stand: Cues for proper hand placement - completed 2x during session from eob.  Remembered instructions during second standing attempt - CGA for both.  Again noting some dizziness. Given gaze stabilization cues.   Stand<>Sit: Cues for safety technique - CGA   Gait: Completed 10'x1 sidestepping to the right up eob initially as pt felt she could not trust her legs.  Allowed pt to rest after 1st sidestepping attempt at gait.  Completed 2nd round of gait 50'x1 w/ rw and CGA to min a of 1 w/ chair follow to reassure pt.  Pt w/ slow yadiel and notes dizziness increases when she looks down toward the floor.  Pt doing well w/ focused gaze during gait.    Therapist's Comments: Educated pt on not overstimulating her system throughout the day and giving herself plenty of time to rest w/o TV, auditory stimulation, phone etc.  Pt and spouse express understanding.  Currently believe pt will be ok for d/c home w/ home PT, but by d/c (if she remains for 14 days) may benefit from outpatient PT instead.      Patient End of Session: Up in chair;Needs met;Call light within reach;RN aware of session/findings;All patient questions  and concerns addressed;Alarm set;Family present (Lis Estrada aware of treatment session)    PT Session Time: 30 minutes  Gait Trainin minutes  Therapeutic Activity: 6 minutes  Therapeutic Exercise: 0 minutes   Neuromuscular Re-education: 0 minutes

## 2024-08-13 ENCOUNTER — APPOINTMENT (OUTPATIENT)
Dept: ULTRASOUND IMAGING | Facility: HOSPITAL | Age: 39
End: 2024-08-13
Attending: INTERNAL MEDICINE
Payer: MEDICAID

## 2024-08-13 LAB
ANION GAP SERPL CALC-SCNC: 7 MMOL/L (ref 0–18)
BASOPHILS # BLD AUTO: 0.02 X10(3) UL (ref 0–0.2)
BASOPHILS NFR BLD AUTO: 0.1 %
BUN BLD-MCNC: 12 MG/DL (ref 9–23)
CALCIUM BLD-MCNC: 9.6 MG/DL (ref 8.7–10.4)
CHLORIDE SERPL-SCNC: 108 MMOL/L (ref 98–112)
CO2 SERPL-SCNC: 23 MMOL/L (ref 21–32)
CREAT BLD-MCNC: 0.59 MG/DL
EGFRCR SERPLBLD CKD-EPI 2021: 119 ML/MIN/1.73M2 (ref 60–?)
EOSINOPHIL # BLD AUTO: 0 X10(3) UL (ref 0–0.7)
EOSINOPHIL NFR BLD AUTO: 0 %
ERYTHROCYTE [DISTWIDTH] IN BLOOD BY AUTOMATED COUNT: 18.7 %
GLUCOSE BLD-MCNC: 110 MG/DL (ref 70–99)
GLUCOSE BLD-MCNC: 134 MG/DL (ref 70–99)
GLUCOSE BLD-MCNC: 157 MG/DL (ref 70–99)
GLUCOSE BLD-MCNC: 158 MG/DL (ref 70–99)
GLUCOSE BLD-MCNC: 173 MG/DL (ref 70–99)
HCT VFR BLD AUTO: 32.1 %
HGB BLD-MCNC: 9.4 G/DL
IMM GRANULOCYTES # BLD AUTO: 0.16 X10(3) UL (ref 0–1)
IMM GRANULOCYTES NFR BLD: 1 %
LYMPHOCYTES # BLD AUTO: 1.98 X10(3) UL (ref 1–4)
LYMPHOCYTES NFR BLD AUTO: 12.3 %
MAGNESIUM SERPL-MCNC: 1.9 MG/DL (ref 1.6–2.6)
MCH RBC QN AUTO: 21.9 PG (ref 26–34)
MCHC RBC AUTO-ENTMCNC: 29.3 G/DL (ref 31–37)
MCV RBC AUTO: 74.8 FL
MONOCYTES # BLD AUTO: 0.67 X10(3) UL (ref 0.1–1)
MONOCYTES NFR BLD AUTO: 4.1 %
NEUTROPHILS # BLD AUTO: 13.32 X10 (3) UL (ref 1.5–7.7)
NEUTROPHILS # BLD AUTO: 13.32 X10(3) UL (ref 1.5–7.7)
NEUTROPHILS NFR BLD AUTO: 82.5 %
OSMOLALITY SERPL CALC.SUM OF ELEC: 289 MOSM/KG (ref 275–295)
PLATELET # BLD AUTO: 391 10(3)UL (ref 150–450)
POTASSIUM SERPL-SCNC: 4.1 MMOL/L (ref 3.5–5.1)
RBC # BLD AUTO: 4.29 X10(6)UL
SODIUM SERPL-SCNC: 138 MMOL/L (ref 136–145)
WBC # BLD AUTO: 16.2 X10(3) UL (ref 4–11)

## 2024-08-13 PROCEDURE — 99231 SBSQ HOSP IP/OBS SF/LOW 25: CPT | Performed by: NEUROLOGICAL SURGERY

## 2024-08-13 PROCEDURE — 93886 INTRACRANIAL COMPLETE STUDY: CPT | Performed by: INTERNAL MEDICINE

## 2024-08-13 PROCEDURE — 99291 CRITICAL CARE FIRST HOUR: CPT

## 2024-08-13 PROCEDURE — 99291 CRITICAL CARE FIRST HOUR: CPT | Performed by: INTERNAL MEDICINE

## 2024-08-13 RX ORDER — HYDROCODONE BITARTRATE AND ACETAMINOPHEN 10; 325 MG/1; MG/1
1 TABLET ORAL EVERY 4 HOURS PRN
Status: DISCONTINUED | OUTPATIENT
Start: 2024-08-13 | End: 2024-08-16

## 2024-08-13 RX ORDER — HYDROCODONE BITARTRATE AND ACETAMINOPHEN 10; 325 MG/1; MG/1
2 TABLET ORAL EVERY 4 HOURS PRN
Status: DISCONTINUED | OUTPATIENT
Start: 2024-08-13 | End: 2024-08-16

## 2024-08-13 NOTE — PLAN OF CARE
Received care of pt circa 1930. Pt resting in bed, a/o x4. Neurologically intact. NSR on monitor. Pt received on RA; lung sounds clear. VSS. Pt c/o moderate headaches and right leg pain. Pain controlled with PRN norco, see MAR. Pt ambulating and voiding in bathroom. Family at bedside. Per pt and family, no further questions. Plan of care continues.

## 2024-08-13 NOTE — PROGRESS NOTES
[facility]  Neurocritical Care Progress Note     Greg Uriarte Patient Status:  Inpatient    1985 MRN [id number]   Location [facility] 6NE-A Attending Cheryle Martinez MD   Hosp Day # 3 PCP Leana Gardner MD     Subjective:   Patient is a 39 year old female h/o htn, prediabetes, anxiety and depression p/w severe HA c/f sentinel sah    Hospital course significant for noted onset of severe HA and neck pain/n/v thus came to ED where w/u revealed ct/cta with ventricular enlargement compared to prior and mri with possible aneurysm thus underwent LP and admitted for further eval.     No acute events overnight.    Vitals:   Temp:  [97.2 °F (36.2 °C)-98.4 °F (36.9 °C)] 97.6 °F (36.4 °C)  Pulse:  [] 79  Resp:  [11-28] 15  BP: (125-151)/(70-96) 139/82  SpO2:  [93 %-100 %] 100 %  Body mass index is 38.86 kg/m².    Intake/Output:    Intake/Output Summary (Last 24 hours) at 2024 1213  Last data filed at 2024 1000  Gross per 24 hour   Intake 490 ml   Output 1100 ml   Net -610 ml     Current Meds:  Current Facility-Administered Medications   Medication Dose Route Frequency    HYDROcodone-acetaminophen (Norco)  MG per tab 1 tablet  1 tablet Oral Q4H PRN    Or    HYDROcodone-acetaminophen (Norco)  MG per tab 2 tablet  2 tablet Oral Q4H PRN    insulin aspart (NovoLOG) 100 Units/mL FlexPen 1-5 Units  1-5 Units Subcutaneous TID AC and HS    enoxaparin (Lovenox) 40 MG/0.4ML SUBQ injection 40 mg  40 mg Subcutaneous Daily    docusate sodium (Colace) cap 100 mg  100 mg Oral BID    polyethylene glycol (PEG 3350) (Miralax) 17 g oral packet 17 g  17 g Oral Daily PRN    magnesium hydroxide (Milk of Magnesia) 400 MG/5ML oral suspension 30 mL  30 mL Oral Daily PRN    bisacodyl (Dulcolax) 10 MG rectal suppository 10 mg  10 mg Rectal Daily PRN    fleet enema (Fleet) 7-19 GM/118ML rectal enema 133 mL  1 enema Rectal Once PRN    labetalol (Trandate) 5 mg/mL injection 10 mg  10 mg Intravenous  Q10 Min PRN    hydrALAzine (Apresoline) 20 mg/mL injection 10 mg  10 mg Intravenous Q2H PRN    metoclopramide (Reglan) 5 mg/mL injection 10 mg  10 mg Intravenous Q8H PRN    niMODipine (Nimotop) cap 60 mg  60 mg Oral 6 times per day    midazolam (Versed) 2 MG/2ML injection 2 mg  2 mg Intravenous Q15 Min PRN    lactated ringers infusion   Intravenous Continuous    morphINE PF 2 MG/ML injection 1 mg  1 mg Intravenous Q2H PRN    Or    morphINE PF 2 MG/ML injection 2 mg  2 mg Intravenous Q2H PRN    ondansetron (Zofran) 4 MG/2ML injection 4 mg  4 mg Intravenous Q6H PRN    niCARdipine in sodium chloride 0.86% (carDENE) 20 mg/200mL infusion premix  5-15 mg/hr Intravenous Continuous PRN    aspirin chewable tab 81 mg  81 mg Oral Daily     Physical Examination:   General- No acute distress  CV- RRR  Resp- CTA Bilat  Neuro-  Mental status- awake and alert, regards and follows commands, oriented x3, speech fluent  Cranial nerves- pupils equally round and reactive to light, extraocular muscles intact, face symmetric, visual fields full  Motor- 5/5 throughout  Sens-  Intact to light touch    Diagnostics:   US TRANSCRANIAL ARTERIES COMPLETE  (CPT=93886)    Result Date: 8/13/2024  CONCLUSION:  No sonographic evidence of significant intracranial arterial vasospasm.   LOCATION:  [facility]   Dictated by (CST): Melquiades Preston MD on 8/13/2024 at 7:39 AM     Finalized by (CST): Melquiades Preston MD on 8/13/2024 at 7:41 AM      Lab Review     Recent Labs   Lab 08/11/24 0415 08/12/24 0512 08/13/24 0444    140 138   K 3.4* 4.2  4.2 4.1   * 110 108   CO2 22.0 23.0 23.0   * 156* 158*   BUN <5* 9 12   CREATSERUM 0.62 0.57 0.59     Recent Labs   Lab 08/11/24 0415 08/12/24  0512 08/13/24  0444   WBC 9.3 13.4* 16.2*   HGB 9.0* 9.2* 9.4*   .0 339.0 391.0     Assesment/Plan:     Neuro:  HA/n/v- in setting of ventricular enlargement and cerebral aneurysm, c/f sentinel/occult sah vs infectious process.   S/p successful coil  embolization of supraclinoid R ica terminus/A1 aneurysm per PADILLA 8/10.   Cont sah protocol with neurochecks, nimotop and TCDs.   PADILLA and NS on c/s.   Cardiac:  Htn- sbp goal 100-180  Pulmonary:  Stable on RA  Renal:  IVFs  GI:  PO as tolerated  Heme/ID:  Afebrile, trend leukocytosis.   Empiric abx per ID, f/u cultures.   Endocrine/Rheum:  Monitor glucose, sliding scale insulin prn  DVT Prophylaxis:  SCD’s, lvx    Goals of the Day: neurochecks   A total of 40 minutes of critical care time (exclusive of billable procedures) was administered to manage and/or prevent neurologic instability. This involved direct patient intervention, complex decision making, and/or extensive discussions with the patient, family, and clinical staff.    Thank you for allowing me to participate in the care of this patient.     Jo Winters MD, [facility]  Medical Director of System Neurosciences  Chief, Section of Neurocritical Care  [facility]

## 2024-08-13 NOTE — PROGRESS NOTES
.[facility] Hospitalist note    PCP: Leana Gardner MD    Chief Complaint:  F/u headache    SUBJECTIVE:  Headache better today. Norco still wears off after a bit. Some discomfort in low back down legs when she moves.    OBJECTIVE:  Temp:  [97.2 °F (36.2 °C)-98.4 °F (36.9 °C)] 97.6 °F (36.4 °C)  Pulse:  [] 76  Resp:  [11-28] 16  BP: (125-151)/(70-96) 133/79  SpO2:  [93 %-100 %] 100 %    Intake/Output:    Intake/Output Summary (Last 24 hours) at 8/13/2024 0938  Last data filed at 8/13/2024 0900  Gross per 24 hour   Intake 740 ml   Output 1400 ml   Net -660 ml       Last 3 Weights   08/13/24 0500 233 lb 8 oz (105.9 kg)   08/11/24 0700 231 lb 14.8 oz (105.2 kg)   08/10/24 1326 213 lb 13.5 oz (97 kg)   08/09/24 2053 213 lb 13.5 oz (97 kg)   05/05/24 1806 212 lb (96.2 kg)   02/11/23 1826 230 lb (104.3 kg)       Exam  Gen: No acute distress  HEENT: anicteric sclera, MMM  Pulm: Lungs clear, normal respiratory effort  CV: Heart with regular rate and rhythm, no peripheral edema  Abd: Abdomen soft, nontender, nondistended, no organomegaly, bowel sounds present  Skin: no rashes or lesions    Data Review:         Labs:     Recent Labs   Lab 08/09/24  2156 08/10/24  1640 08/11/24  0415 08/12/24  0512 08/13/24  0444   WBC 13.1*  --  9.3 13.4* 16.2*   HGB 9.7*  --  9.0* 9.2* 9.4*   MCV 72.8*  --  71.7* 72.9* 74.8*   .0  --  313.0 339.0 391.0   NE 9.71*  --   --   --  13.32*   LYMABS 2.57  --   --   --  1.98   INR  --  1.06  --   --   --        Recent Labs   Lab 08/09/24 2156 08/11/24  0415 08/12/24  0512 08/13/24  0444    143 140 138   K 3.2* 3.4* 4.2  4.2 4.1    114* 110 108   CO2 22.0 22.0 23.0 23.0   BUN 9 <5* 9 12   CREATSERUM 0.61 0.62 0.57 0.59   CA 9.5 8.9 9.9 9.6   MG  --  1.9 2.0 1.9   * 109* 156* 158*       Recent Labs   Lab 08/09/24 2156   ALT 14   AST 13   ALB 4.8       No results for input(s): \"TROP\", \"CK\", \"PBNP\", \"PCT\" in the last 168 hours.    No results for input(s): \"CRP\", \"EVER\", \"LDH\",  \"DDIMER\" in the last 168 hours.    Recent Labs   Lab 08/12/24  0552 08/12/24  1104 08/12/24  1557 08/12/24 2005 08/13/24  0707   PGLU 192* 215* 131* 193* 157*       Meds:   Scheduled Medication:   insulin aspart  1-5 Units Subcutaneous TID AC and HS    enoxaparin  40 mg Subcutaneous Daily    docusate sodium  100 mg Oral BID    niMODipine  60 mg Oral 6 times per day    aspirin  81 mg Oral Daily     Continuous Infusing Medication:   lactated ringers 100 mL/hr at 08/11/24 0837    niCARdipine Stopped (08/11/24 0000)     PRN Medication:  HYDROcodone-acetaminophen **OR** HYDROcodone-acetaminophen    polyethylene glycol (PEG 3350)    magnesium hydroxide    bisacodyl    fleet enema    labetalol    hydrALAzine    [DISCONTINUED] ondansetron **OR** metoclopramide    midazolam    morphINE **OR** morphINE    ondansetron    niCARdipine       Microbiology:    Hospital Encounter on 08/09/24   1. CSF culture     Status: None (Preliminary result)    Collection Time: 08/10/24  6:13 AM    Specimen: CSF, lumbar puncture; Cerebral spinal fluid   Result Value Ref Range    CSF Culture Result No Growth 2 Days N/A    CSF Smear 3+ WBCs seen N/A    CSF Smear No organisms seen N/A    CSF Smear This is a cytocentrifuged smear. N/A       Lab Results   Component Value Date    COVID19 Not Detected 08/09/2024    COVID19 Not Detected 02/11/2023    COVID19 DETECTED (A) 01/05/2022        Assessment/Plan:       38 yo woman with h/o htn and migraines who presented with severe HA with sensation of hot liquid down back of head. In course of ED w/u, diagnoses of meningitis, sah/aneurysm considered.     1) headache- 2/2 R ICA/JARED aneurysm rupture, SAH now s/p coil embolization. Meningitis was also considered  - repeat head ct neg and ultrasounds without evidence of vasospasm  - on nimodipine, monitoring for vasospasm, TCD daily  - keppra  - asa 81 daily  - MRA on day 7  - appreciate ID recs, csf culture neg. Off iv abx  - norco prn for pain     2) HTN-  per  chart but does not appear to be on meds for this. Bp parameters per neuro ICU.       [facility] [place], deric Martinez MD  [facility] Hospitalist  Pager: [phone]

## 2024-08-13 NOTE — PROGRESS NOTES
INPATIENT PROGRESS NOTE    Assessment:   Greg Uriarte in room 6607/6607-A, is a 39 year old female, Hospital Day ( LOS: 3 days ) hospitalized for Acute intractable headache, unspecified headache type.      Status post 8/10/2024: Dr. Collins: Coil embolization of supraclinoid right internal carotid artery/A1 anterior cerebral artery aneurysm    The patient's other hospital problems include:   Active Hospital Problems    Aneurysmal subarachnoid hemorrhage (HCC)      Cerebral aneurysm rupture (HCC)      *Acute intractable headache, unspecified headache type        Plan:     1.  Medical management per hospitalist and critical care  2.  Neuro IR following, appreciate recommendations  3.  Continue to monitor for signs/symptoms of hydrocephalus  4.  Daily TCD's, AEDs and BP parameters per neurocritical care  5.  Discussed with Dr. Portillo    The patient was seen and examined with Dr. Portillo.  I am acting as scribe.  Patient agreed to the plan, verbalized understanding and was very appreciative.  Discussed with nursing.    Subjective:   The patient endorses no headaches or new neurologic complaints.  She is feeling very well today.  Transcranial ultrasound completed this morning.    Objective:   I&O: I/O last 3 completed shifts:  In: 1233.8 [P.O.:740; I.V.:93.8; IV PIGGYBACK:400]  Out:  [Urine:2000]     VITALS: /84   Pulse 70   Temp 97.2 °F (36.2 °C) (Temporal)   Resp 16   Wt 233 lb 8 oz (105.9 kg)   LMP 2024 (Exact Date)   SpO2 97%   BMI 38.86 kg/m²  Temp (24hrs), Av.9 °F (36.6 °C), Min:97.2 °F (36.2 °C), Max:98.4 °F (36.9 °C)       Clinical exam:  The patient is awake, alert and orientated.  Speech fluent.  Comprehension intact.  Answer questions appropriately.  Easily follows commands.  Breathing easy and even.  Skin warm and dry.  Sitting up comfortably in the recliner chair  Moving bilateral upper and lower extremities, spontaneously to full resistance, proximally and distally    Imaging  reviewed:    Study Result    Narrative   PROCEDURE:  US TRANSCRANIAL ARTERIES COMPLETE  (CPT=93886)     COMPARISON:  [facility] , US, US TRANSCRANIAL ARTERIES COMPLETE  (CPT=93886), 8/12/2024, 6:22 AM.     INDICATIONS:  Subarachnoid Hemorrhage     TECHNIQUE:  Transcranial Doppler ultrasound of the intracranial arteries was performed utilizing spectral waveform analysis.     PATIENT STATED HISTORY: (As transcribed by Technologist)           FINDINGS:    The right-sided velocities in cm/s are as follows:  ICA CERVICAL:  34  ICA TERMINUS:  46  MCA PROXIMAL:  55  MCA MID:  56  MCA DISTAL:  27  MCA/ICA RATIO:  1.68  JARED PROXIMAL:  40  JARED DISTAL:  46  PCA PROXIMAL:  92  PCA DISTAL:  91  VERTEBRAL:  42     The left-sided velocities in cm/s are as follows:  ICA CERVICAL:  38  ICA TERMINUS:  47  MCA PROXIMAL:  100  MCA MID:  84  MCA DISTAL:  84  MCA/ICA RATIO:  2.64  JARED PROXIMAL:  54  JARED DISTAL:  50  PCA PROXIMAL:  57  PCA DISTAL:  59  VERTEBRAL:  40     BASILAR  ARTERY velocity in cm/s:  56     MCA mean velocities/ratios:  mild: 120-150, 3-4.5  moderate: 150-200, 4.5-6  severe: >200, >6                   Impression   CONCLUSION:  No sonographic evidence of significant intracranial arterial vasospasm.        LOCATION:  [facility]        Dictated by (CST): Melquiades Preston MD on 8/13/2024 at 7:39 AM      Finalized by (CST): Melquiades Preston MD on 8/13/2024 at 7:41 AM        Study Result    Narrative   PROCEDURE:  CT BRAIN OR HEAD (11696)     COMPARISON:  [facility], XS, IR HEAD AND NECK EMBOLIZATION, 8/10/2024, 4:26 PM.  [facility] , CT, CT BRAIN OR HEAD (66400), 8/10/2024, 10:29 PM.     INDICATIONS:  f/u ventricular enlargement     TECHNIQUE:  Noncontrast CT scanning is performed through the brain. Dose reduction techniques were used. Dose information is transmitted to the ACR (American College of Radiology) NRDR (National Radiology Data Registry) which includes the Dose Index  Registry.     PATIENT STATED HISTORY: (As  transcribed by Technologist)  Brain coils placed last night. Interval evaluation ventricle enlargement.         FINDINGS:    VENTRICLES/SULCI:  Ventricles and sulci are normal in size.    INTRACRANIAL:  Aneurysm clip in the right PCOM is noted.  There are no abnormal extraaxial fluid collections.  There is no midline shift.  There are no intraparenchymal brain abnormalities.  There is nothing specific for acute infarct.  There is no  hemorrhage or mass lesion.    SINUSES:           No sign of acute sinusitis.    MASTOIDS:          No sign of acute inflammation.  SKULL:             No evidence for fracture or osseous abnormality.  OTHER:             None.                   Impression   CONCLUSION:    1. No acute intracranial abnormality.  Right PCOM aneurysm clip is noted           LOCATION:  [facility]        Dictated by (CST): Robert Robles MD on 8/11/2024 at 10:32 AM      Finalized by (CST): Robert Robles MD on 8/11/2024 at 10:36 AM       Olive Knutson M.S., PA-C  [address], [address] [address]  [phone]  8/13/2024 8:16 AM    Dragon speech recognition software was used to prepare this note. If a word or phrase is confusing, it is likely due to a failure of recognition. Please contact me with any questions or clarifications.    Is this a shared or split note between Advanced Practice Provider and Physician? Yes  Patient seen examined with PA  Case discussed  TCD's look good  She looks great  Vasospasm watch  Following

## 2024-08-13 NOTE — PROGRESS NOTES
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 Reports mild headache with associated neck stiffness. Also reports BLE pain and stiffness provoked by activity. Also endorse occasional \"wavy vision\" at times in bilateral eyes. Denies any visual field cut/double vision, etc.       Objective/Physical Exam:    Vital Signs:  Blood pressure 127/84, pulse 70, temperature 97.2 °F (36.2 °C), temperature source Temporal, resp. rate 16, weight 233 lb 8 oz (105.9 kg), last menstrual period 2024, SpO2 97%, not currently breastfeeding.    Neurologic:   Mental status: Oriented to person, place, and time   Speech: Fluent, no dysarthria  Memory and comprehension: Intact   Cranial Nerves: PERRLA, EOMI, no nystagmus, facial sensation intact, face symmetric, tongue midline, shoulder shrug equal, remainder CN intact  Motor: No drift, no focal arm or leg weakness  Sensory: Intact to light touch  Coordination: FTN intact  Gait: Deferred    Inpt Meds:   Current Facility-Administered Medications   Medication Dose Route Frequency    insulin aspart (NovoLOG) 100 Units/mL FlexPen 1-5 Units  1-5 Units Subcutaneous TID AC and HS    enoxaparin (Lovenox) 40 MG/0.4ML SUBQ injection 40 mg  40 mg Subcutaneous Daily    HYDROcodone-acetaminophen (Norco)  MG per tab 1 tablet  1 tablet Oral Q6H PRN    Or    HYDROcodone-acetaminophen (Norco)  MG per tab 2 tablet  2 tablet Oral Q6H PRN    docusate sodium (Colace) cap 100 mg  100 mg Oral BID    polyethylene glycol (PEG 3350) (Miralax) 17 g oral packet 17 g  17 g Oral Daily PRN    magnesium hydroxide (Milk of Magnesia) 400 MG/5ML oral suspension 30 mL  30 mL Oral Daily PRN    bisacodyl (Dulcolax) 10 MG rectal  suppository 10 mg  10 mg Rectal Daily PRN    fleet enema (Fleet) 7-19 GM/118ML rectal enema 133 mL  1 enema Rectal Once PRN    dexamethasone (Decadron) 4 MG/ML injection 4 mg  4 mg Intravenous Q8H    labetalol (Trandate) 5 mg/mL injection 10 mg  10 mg Intravenous Q10 Min PRN    hydrALAzine (Apresoline) 20 mg/mL injection 10 mg  10 mg Intravenous Q2H PRN    metoclopramide (Reglan) 5 mg/mL injection 10 mg  10 mg Intravenous Q8H PRN    niMODipine (Nimotop) cap 60 mg  60 mg Oral 6 times per day    midazolam (Versed) 2 MG/2ML injection 2 mg  2 mg Intravenous Q15 Min PRN    lactated ringers infusion   Intravenous Continuous    morphINE PF 2 MG/ML injection 1 mg  1 mg Intravenous Q2H PRN    Or    morphINE PF 2 MG/ML injection 2 mg  2 mg Intravenous Q2H PRN    ondansetron (Zofran) 4 MG/2ML injection 4 mg  4 mg Intravenous Q6H PRN    niCARdipine in sodium chloride 0.86% (carDENE) 20 mg/200mL infusion premix  5-15 mg/hr Intravenous Continuous PRN    aspirin chewable tab 81 mg  81 mg Oral Daily       Labs:  Lab Results   Component Value Date    WBC 16.2 2024    HGB 9.4 2024    HCT 32.1 2024    .0 2024    CREATSERUM 0.59 2024    BUN 12 2024     2024    K 4.1 2024     2024    CO2 23.0 2024     2024    CA 9.6 2024    MG 1.9 2024    PGLU 157 2024       Imagin2024 TCD   CONCLUSION:  No sonographic evidence of significant intracranial arterial vasospasm.     2024 CT head  CONCLUSION:    1. No acute intracranial abnormality.  Right PCOM aneurysm clip is noted     8/10/2024 CT head   CONCLUSION:  Interval coiling of the right PCOM aneurysm.     8/10/2024 MRA brain   CONCLUSION:    1. Findings compatible with a right posterior communicating artery aneurysm measuring up to 0.5 x 0.4 cm     8/10/2024 MRI brain w/wo   CONCLUSION:    1. Right PCOM aneurysm measuring 3 millimeters.   2. No acute intracranial  abnormality     8/9/2024 CTA head + neck   CONCLUSION:  The CTA appears normal, but the caliber of the lateral and 3rd ventricles has increased compared to the prior.  The patient is young, and the rest of the brain does not appear atrophied, and the ventricular size change does not appear to be secondary to brain volume loss and could reflect developing hydrocephalus.  Furthermore there is suggestion of effacement of the basilar cisterns.  Consider MRI follow-up.  Note is also made of prominent adenoid and tonsillar soft tissues.         Assessment/Plan:    SAH- now s/p coil embo of supraclinoid R ICA/A1 JARED aneurysm     ASA 81mg daily   Plan for 7 day MRA (8/17)    SAH Order Set per [facility]    Nimotop  TCD daily  NA (138) 7 Mag (1.9)   Seizure precautions  PT/OT/ST       Dr. Collins aware of the above.     JOSEPHINE Fong  [facility]  8/13/2024, 8:16 AM  Spectre [id number]    Total Critical Care Time Spent: 40 mins

## 2024-08-13 NOTE — PLAN OF CARE
[facility] NS[facility] per tele, vitals stable. Besides tingling in toes, neuro status remains intact and checked every 2 hours. Pain in head and back managed with PRN Norco, she is relieved that she can get it more frequently. She has been up ambulating the halls, and was able to go farther than the day before! Up to bathroom with a SBA. I & Os monitored. Plan of care discussed with pt, questions answered.         Problem: Patient/Family Goals  Goal: Patient/Family Long Term Goal  Description: Patient's Long Term Goal: To go home    Interventions:  -  - See additional Care Plan goals for specific interventions  Outcome: Progressing  Goal: Patient/Family Short Term Goal  Description: Patient's Short Term Goal: be without pain    Interventions:   - Pain meds as ordered  - assess/reassess pain per protocol  - See additional Care Plan goals for specific interventions  Outcome: Progressing     Problem: METABOLIC/FLUID AND ELECTROLYTES - ADULT  Goal: Glucose maintained within prescribed range  Description: INTERVENTIONS:  - Monitor Blood Glucose as ordered  - Assess for signs and symptoms of hyperglycemia and hypoglycemia  - Administer ordered medications to maintain glucose within target range  - Assess barriers to adequate nutritional intake and initiate nutrition consult as needed  - Instruct patient on self management of diabetes  Outcome: Progressing  Goal: Electrolytes maintained within normal limits  Description: INTERVENTIONS:  - Monitor labs and rhythm and assess patient for signs and symptoms of electrolyte imbalances  - Administer electrolyte replacement as ordered  - Monitor response to electrolyte replacements, including rhythm and repeat lab results as appropriate  - Fluid restriction as ordered  - Instruct patient on fluid and nutrition restrictions as appropriate  Outcome: Progressing  Goal: Hemodynamic stability and optimal renal function maintained  Description: INTERVENTIONS:  -  Monitor labs and assess for signs and symptoms of volume excess or deficit  - Monitor intake, output and patient weight  - Monitor urine specific gravity, serum osmolarity and serum sodium as indicated or ordered  - Monitor response to interventions for patient's volume status, including labs, urine output, blood pressure (other measures as available)  - Encourage oral intake as appropriate  - Instruct patient on fluid and nutrition restrictions as appropriate  Outcome: Progressing     Problem: NEUROLOGICAL - ADULT  Goal: Achieves stable or improved neurological status  Description: INTERVENTIONS  - Assess for and report changes in neurological status  - Initiate measures to prevent increased intracranial pressure  - Maintain blood pressure and fluid volume within ordered parameters to optimize cerebral perfusion and minimize risk of hemorrhage  - Monitor temperature, glucose, and sodium. Initiate appropriate interventions as ordered  Outcome: Progressing  Goal: Absence of seizures  Description: INTERVENTIONS  - Monitor for seizure activity  - Administer anti-seizure medications as ordered  - Monitor neurological status  Outcome: Progressing  Goal: Remains free of injury related to seizure activity  Description: INTERVENTIONS:  - Maintain airway, patient safety  and administer oxygen as ordered  - Monitor patient for seizure activity, document and report duration and description of seizure to MD/LIP  - If seizure occurs, turn patient to side and suction secretions as needed  - Reorient patient post seizure  - Seizure pads on all 4 side rails  - Instruct patient/family to notify RN of any seizure activity  - Instruct patient/family to call for assistance with activity based on assessment  Outcome: Progressing  Goal: Achieves maximal functionality and self care  Description: INTERVENTIONS  - Monitor swallowing and airway patency with patient fatigue and changes in neurological status  - Encourage and assist patient to  increase activity and self care with guidance from PT/OT  - Encourage visually impaired, hearing impaired and aphasic patients to use assistive/communication devices  Outcome: Progressing     Problem: PAIN - ADULT  Goal: Verbalizes/displays adequate comfort level or patient's stated pain goal  Description: INTERVENTIONS:  - Encourage pt to monitor pain and request assistance  - Assess pain using appropriate pain scale  - Administer analgesics based on type and severity of pain and evaluate response  - Implement non-pharmacological measures as appropriate and evaluate response  - Consider cultural and social influences on pain and pain management  - Manage/alleviate anxiety  - Utilize distraction and/or relaxation techniques  - Monitor for opioid side effects  - Notify MD/LIP if interventions unsuccessful or patient reports new pain  - Anticipate increased pain with activity and pre-medicate as appropriate  Outcome: Progressing

## 2024-08-14 ENCOUNTER — APPOINTMENT (OUTPATIENT)
Dept: ULTRASOUND IMAGING | Facility: HOSPITAL | Age: 39
End: 2024-08-14
Attending: INTERNAL MEDICINE
Payer: MEDICAID

## 2024-08-14 LAB
ANION GAP SERPL CALC-SCNC: 6 MMOL/L (ref 0–18)
BUN BLD-MCNC: 12 MG/DL (ref 9–23)
CALCIUM BLD-MCNC: 9.1 MG/DL (ref 8.7–10.4)
CHLORIDE SERPL-SCNC: 107 MMOL/L (ref 98–112)
CO2 SERPL-SCNC: 26 MMOL/L (ref 21–32)
CREAT BLD-MCNC: 0.57 MG/DL
EGFRCR SERPLBLD CKD-EPI 2021: 120 ML/MIN/1.73M2 (ref 60–?)
ERYTHROCYTE [DISTWIDTH] IN BLOOD BY AUTOMATED COUNT: 18.2 %
GLUCOSE BLD-MCNC: 102 MG/DL (ref 70–99)
GLUCOSE BLD-MCNC: 113 MG/DL (ref 70–99)
GLUCOSE BLD-MCNC: 120 MG/DL (ref 70–99)
GLUCOSE BLD-MCNC: 145 MG/DL (ref 70–99)
GLUCOSE BLD-MCNC: 96 MG/DL (ref 70–99)
HCT VFR BLD AUTO: 32.2 %
HGB BLD-MCNC: 9.4 G/DL
MCH RBC QN AUTO: 21.8 PG (ref 26–34)
MCHC RBC AUTO-ENTMCNC: 29.2 G/DL (ref 31–37)
MCV RBC AUTO: 74.5 FL
OSMOLALITY SERPL CALC.SUM OF ELEC: 289 MOSM/KG (ref 275–295)
PLATELET # BLD AUTO: 371 10(3)UL (ref 150–450)
POTASSIUM SERPL-SCNC: 3.3 MMOL/L (ref 3.5–5.1)
RBC # BLD AUTO: 4.32 X10(6)UL
SODIUM SERPL-SCNC: 139 MMOL/L (ref 136–145)
WBC # BLD AUTO: 12.9 X10(3) UL (ref 4–11)

## 2024-08-14 PROCEDURE — 99291 CRITICAL CARE FIRST HOUR: CPT

## 2024-08-14 PROCEDURE — 93886 INTRACRANIAL COMPLETE STUDY: CPT | Performed by: INTERNAL MEDICINE

## 2024-08-14 PROCEDURE — 99291 CRITICAL CARE FIRST HOUR: CPT | Performed by: INTERNAL MEDICINE

## 2024-08-14 PROCEDURE — 99231 SBSQ HOSP IP/OBS SF/LOW 25: CPT | Performed by: NEUROLOGICAL SURGERY

## 2024-08-14 RX ORDER — ALBUTEROL SULFATE 90 UG/1
2 AEROSOL, METERED RESPIRATORY (INHALATION) EVERY 4 HOURS PRN
Status: DISCONTINUED | OUTPATIENT
Start: 2024-08-14 | End: 2024-08-23

## 2024-08-14 RX ORDER — POTASSIUM CHLORIDE 1500 MG/1
40 TABLET, EXTENDED RELEASE ORAL ONCE
Status: COMPLETED | OUTPATIENT
Start: 2024-08-14 | End: 2024-08-14

## 2024-08-14 NOTE — PLAN OF CARE
Assumed care of patient this am, patient resting in bed. Patient endorses more headache and low back pain today, requesting to just rest. On neuro exam, patient endorses light sensitivity, and tingling in the tips of her toes, exam otherwise WDL, see flowsheet for detailed assessment.       Problem: METABOLIC/FLUID AND ELECTROLYTES - ADULT  Goal: Glucose maintained within prescribed range  Description: INTERVENTIONS:  - Monitor Blood Glucose as ordered  - Assess for signs and symptoms of hyperglycemia and hypoglycemia  - Administer ordered medications to maintain glucose within target range  - Assess barriers to adequate nutritional intake and initiate nutrition consult as needed  - Instruct patient on self management of diabetes  Outcome: Progressing     Problem: METABOLIC/FLUID AND ELECTROLYTES - ADULT  Goal: Hemodynamic stability and optimal renal function maintained  Description: INTERVENTIONS:  - Monitor labs and assess for signs and symptoms of volume excess or deficit  - Monitor intake, output and patient weight  - Monitor urine specific gravity, serum osmolarity and serum sodium as indicated or ordered  - Monitor response to interventions for patient's volume status, including labs, urine output, blood pressure (other measures as available)  - Encourage oral intake as appropriate  - Instruct patient on fluid and nutrition restrictions as appropriate  Outcome: Progressing

## 2024-08-14 NOTE — PROGRESS NOTES
[facility]  Neurocritical Care Progress Note     Greg Uriarte Patient Status:  Inpatient    1985 MRN [id number]   Location [facility] 6NE-A Attending Cheryle Martinez MD   Hosp Day # 4 PCP Leana Gardner MD     Subjective:   Patient is a 39 year old female h/o htn, prediabetes, anxiety and depression p/w severe HA c/f sentinel sah    Hospital course significant for noted onset of severe HA and neck pain/n/v thus came to ED where w/u revealed ct/cta with ventricular enlargement compared to prior and mri with possible aneurysm thus underwent LP and admitted for further eval.     No acute events overnight.    Vitals:   Temp:  [97.3 °F (36.3 °C)-98.3 °F (36.8 °C)] 97.3 °F (36.3 °C)  Pulse:  [69-96] 69  Resp:  [12-26] 15  BP: (119-155)/(60-98) 121/71  SpO2:  [96 %-100 %] 96 %  Body mass index is 39.01 kg/m².    Intake/Output:    Intake/Output Summary (Last 24 hours) at 2024 1119  Last data filed at 2024 0500  Gross per 24 hour   Intake 1788 ml   Output 1100 ml   Net 688 ml     Current Meds:  Current Facility-Administered Medications   Medication Dose Route Frequency    HYDROcodone-acetaminophen (Norco)  MG per tab 1 tablet  1 tablet Oral Q4H PRN    Or    HYDROcodone-acetaminophen (Norco)  MG per tab 2 tablet  2 tablet Oral Q4H PRN    insulin aspart (NovoLOG) 100 Units/mL FlexPen 1-5 Units  1-5 Units Subcutaneous TID AC and HS    enoxaparin (Lovenox) 40 MG/0.4ML SUBQ injection 40 mg  40 mg Subcutaneous Daily    docusate sodium (Colace) cap 100 mg  100 mg Oral BID    polyethylene glycol (PEG 3350) (Miralax) 17 g oral packet 17 g  17 g Oral Daily PRN    magnesium hydroxide (Milk of Magnesia) 400 MG/5ML oral suspension 30 mL  30 mL Oral Daily PRN    bisacodyl (Dulcolax) 10 MG rectal suppository 10 mg  10 mg Rectal Daily PRN    fleet enema (Fleet) 7-19 GM/118ML rectal enema 133 mL  1 enema Rectal Once PRN    labetalol (Trandate) 5 mg/mL injection 10 mg  10 mg Intravenous  Q10 Min PRN    hydrALAzine (Apresoline) 20 mg/mL injection 10 mg  10 mg Intravenous Q2H PRN    metoclopramide (Reglan) 5 mg/mL injection 10 mg  10 mg Intravenous Q8H PRN    niMODipine (Nimotop) cap 60 mg  60 mg Oral 6 times per day    midazolam (Versed) 2 MG/2ML injection 2 mg  2 mg Intravenous Q15 Min PRN    lactated ringers infusion   Intravenous Continuous    morphINE PF 2 MG/ML injection 1 mg  1 mg Intravenous Q2H PRN    Or    morphINE PF 2 MG/ML injection 2 mg  2 mg Intravenous Q2H PRN    ondansetron (Zofran) 4 MG/2ML injection 4 mg  4 mg Intravenous Q6H PRN    niCARdipine in sodium chloride 0.86% (carDENE) 20 mg/200mL infusion premix  5-15 mg/hr Intravenous Continuous PRN    aspirin chewable tab 81 mg  81 mg Oral Daily     Physical Examination:   General- No acute distress  CV- RRR  Resp- CTA Bilat  Neuro-  Mental status- awake and alert, regards and follows commands, oriented x3, speech fluent  Cranial nerves- pupils equally round and reactive to light, extraocular muscles intact, face symmetric, visual fields full  Motor- 5/5 throughout  Sens-  Intact to light touch    Diagnostics:   US TRANSCRANIAL ARTERIES COMPLETE  (CPT=93886)    Result Date: 8/14/2024  CONCLUSION:   1. Elevated velocity of the proximal left MCA with elevated MCA/ICA ratio (3.74) collectively consistent with mild vasospasm.  2. No evidence of arterial vasospasm of the right-sided cervical or intracranial arteries.  Findings discussed with MARJAN Garrison at 8:50 a.m. Central standard time on 08/14/2024.    LOCATION:  [facility]   Dictated by (CST): Den Chris MD on 8/14/2024 at 8:45 AM     Finalized by (CST): Den Chris MD on 8/14/2024 at 8:49 AM      Lab Review     Recent Labs   Lab 08/12/24  0512 08/13/24  0444 08/14/24  0439    138 139   K 4.2  4.2 4.1 3.3*    108 107   CO2 23.0 23.0 26.0   * 158* 120*   BUN 9 12 12   CREATSERUM 0.57 0.59 0.57     Recent Labs   Lab 08/12/24  0512 08/13/24  0444 08/14/24  0439   WBC 13.4*  16.2* 12.9*   HGB 9.2* 9.4* 9.4*   .0 391.0 371.0     Assesment/Plan:     Neuro:  HA/n/v- in setting of ventricular enlargement and cerebral aneurysm, c/f sentinel/occult sah vs infectious process.   S/p successful coil embolization of supraclinoid R ica terminus/A1 aneurysm per PADILLA 8/10.   Cont sah protocol with neurochecks, nimotop and TCDs- mild elevation in L mca, exam remains intact will cont to monitor.  PADILLA and NS on c/s.   Cardiac:  Htn- sbp goal 100-180  Pulmonary:  Stable on RA  Renal:  IVFs  GI:  PO as tolerated  Heme/ID:  Afebrile, trend leukocytosis.   Empiric abx per ID, f/u cultures.   Endocrine/Rheum:  Monitor glucose, sliding scale insulin prn  DVT Prophylaxis:  SCD’s, lvx    Goals of the Day: neurochecks   A total of 40 minutes of critical care time (exclusive of billable procedures) was administered to manage and/or prevent neurologic instability. This involved direct patient intervention, complex decision making, and/or extensive discussions with the patient, family, and clinical staff.    Thank you for allowing me to participate in the care of this patient.     Jo Winters MD, [facility]  Medical Director of System Neurosciences  Chief, Section of Neurocritical Care  [facility]

## 2024-08-14 NOTE — PROGRESS NOTES
[facility]  Neurosurgery Progress Note  2024    Greg Uriarte Patient Status:  Inpatient    1985 MRN [id number]   Location [facility] 6NE-A Attending Glenn Hoffman, DO   Hosp Day # 4 PCP Leana Gardner MD     SUBJECTIVE:  Greg Uriarte is a(n) 39 year old female status post PPM coiling  She is a little off today she feels  She just feels little weird  Otherwise no new issues      OBJECTIVE / PHYSICAL EXAM:  Vital Signs:  Blood pressure (!) 155/92, pulse 80, temperature 97.7 °F (36.5 °C), temperature source Temporal, resp. rate 21, weight 234 lb 6.4 oz (106.3 kg), last menstrual period 2024, SpO2 98%, not currently breastfeeding.  Awake, alert, oriented x 3  Follows commands x 4  No drift      Lab Results (last 24 hours):  Recent Results (from the past 24 hour(s))   POCT Glucose    Collection Time: 24 12:52 PM   Result Value Ref Range    POC Glucose 173 (H) 70 - 99 mg/dL   POCT Glucose    Collection Time: 24  4:55 PM   Result Value Ref Range    POC Glucose 110 (H) 70 - 99 mg/dL   POCT Glucose    Collection Time: 24  8:06 PM   Result Value Ref Range    POC Glucose 134 (H) 70 - 99 mg/dL   CBC, Platelet; No Differential    Collection Time: 24  4:39 AM   Result Value Ref Range    WBC 12.9 (H) 4.0 - 11.0 x10(3) uL    RBC 4.32 3.80 - 5.30 x10(6)uL    HGB 9.4 (L) 12.0 - 16.0 g/dL    HCT 32.2 (L) 35.0 - 48.0 %    .0 150.0 - 450.0 10(3)uL    MCV 74.5 (L) 80.0 - 100.0 fL    MCH 21.8 (L) 26.0 - 34.0 pg    MCHC 29.2 (L) 31.0 - 37.0 g/dL    RDW 18.2 %   Basic Metabolic Panel (8)    Collection Time: 24  4:39 AM   Result Value Ref Range    Glucose 120 (H) 70 - 99 mg/dL    Sodium 139 136 - 145 mmol/L    Potassium 3.3 (L) 3.5 - 5.1 mmol/L    Chloride 107 98 - 112 mmol/L    CO2 26.0 21.0 - 32.0 mmol/L    Anion Gap 6 0 - 18 mmol/L    BUN 12 9 - 23 mg/dL    Creatinine 0.57 0.55 - 1.02 mg/dL    Calcium, Total 9.1 8.7 - 10.4 mg/dL    Calculated Osmolality 289 275 - 295 mOsm/kg     eGFR-Cr 120 >=60 mL/min/1.73m2   POCT Glucose    Collection Time: 08/14/24  6:27 AM   Result Value Ref Range    POC Glucose 113 (H) 70 - 99 mg/dL       Assessment/Plan:  39-year-old female status post coiling of P-comm aneurysm  She is doing pretty well  Will continue to watch for vasospasm  Spoke with neurocritical care  Spoke with neuro IR  Following        Scott Portillo MD   [facility]  8/14/2024  8:55 AM  Dictated but not proofread

## 2024-08-14 NOTE — PLAN OF CARE
Received care of pt circa 1930. Pt resting in bed, a/o x4. Neurologically intact with some tingling in her toes. NSR on monitor. Pt received on RA; lung sounds clear. VSS. Pt c/o moderate headaches and back pain. Pain managed with PRN norco, see MAR. Pt ambulating and voiding in bathroom. Family at bedside. Updated pt and family on plan of care. Per pt and family, no further questions. Plan of care continues.

## 2024-08-14 NOTE — PROGRESS NOTES
[facility]   part of [facility] Hospitalist Progress Note     Greg Uriarte Patient Status:  Inpatient    1985 MRN [id number]   Location [facility] 6NE-A Attending Glenn Hoffman, DO   Hosp Day # 4 PCP Leana Gardner MD     Follow Up:  The encounter diagnosis was Acute intractable headache, unspecified headache type.    Subjective:     Patient seen and examined this morning at bedside. States she is not feeling as good as she was the last 2 days, has a worse headache today. Otherwise no overnight events/complaints. Informed about US results and slight vasospasm noted which could explain her symptoms today.    Objective:    Review of Systems:   10 point ROS completed and was negative, except for pertinent positive and negatives stated in subjective.    Vital signs:  Temp:  [97.3 °F (36.3 °C)-98.3 °F (36.8 °C)] 97.3 °F (36.3 °C)  Pulse:  [69-96] 76  Resp:  [12-26] 16  BP: (119-155)/(60-98) 130/76  SpO2:  [96 %-100 %] 100 %    Physical Exam:    General: No acute distress.   Respiratory: Clear to auscultation bilaterally. No wheezes. No rhonchi.  Cardiovascular: S1, S2. Regular rate and rhythm. No murmurs.  Abdomen: Soft, nontender, nondistended.  Positive bowel sounds. No rebound or guarding.  Extremities: No edema.  Neuro:  Grossly non focal, no motor deficits.        Diagnostic Data:    Labs:  Recent Labs   Lab 08/09/24  2156 08/10/24  1640 24  0439   WBC 13.1*  --  9.3 13.4* 16.2* 12.9*   HGB 9.7*  --  9.0* 9.2* 9.4* 9.4*   MCV 72.8*  --  71.7* 72.9* 74.8* 74.5*   .0  --  313.0 339.0 391.0 371.0   INR  --  1.06  --   --   --   --        Recent Labs   Lab 245 2414/24  0439   *   < > 156* 158* 120*   BUN 9   < > 9 12 12   CREATSERUM 0.61   < > 0.57 0.59 0.57   CA 9.5   < > 9.9 9.6 9.1   ALB 4.8  --   --   --   --       < > 140 138 139   K 3.2*   < >  4.2  4.2 4.1 3.3*      < > 110 108 107   CO2 22.0   < > 23.0 23.0 26.0   ALKPHO 111*  --   --   --   --    AST 13  --   --   --   --    ALT 14  --   --   --   --    BILT 0.3  --   --   --   --    TP 7.5  --   --   --   --     < > = values in this interval not displayed.     Assessment & Plan:      R ICA/JARED Aneurysm c/b Rupture  SAH s/p Coil Embolization  NSGY/Neurology on consult  Meningitis ruled out  US today: mild vasospasm noted  Plan:  - Continue Nimodipine as directed by NSGY/Neurology  - Neurochecks per protocol  - Continue TCD daily  - Continue ASA  - Stop time set for IVF  - MRA planned for D7 (8/16)  - No longer on IV abx, cultures negative, appreciate ID recs    HTN  - BP parameters as per Neuro    Supplementary Documentation:     Quality:  DVT Prophylaxis: Lovenox  CODE status: Full    Glenn Hoffman D.O.  [facility] Center[facility]

## 2024-08-14 NOTE — PROGRESS NOTES
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 Increased headache on right frontal, currently 7/10. Continued BLE pain/stiffness, unchanged from yesterday.       Objective/Physical Exam:    Vital Signs:  Blood pressure (!) 155/92, pulse 80, temperature 97.7 °F (36.5 °C), temperature source Temporal, resp. rate 21, weight 234 lb 6.4 oz (106.3 kg), last menstrual period 2024, SpO2 98%, not currently breastfeeding.    Neurologic:   Mental status: Oriented to person, place, and time   Speech: Fluent, no dysarthria  Memory and comprehension: Intact   Cranial Nerves: PERRLA, EOMI, no nystagmus, facial sensation intact, face symmetric, tongue midline, shoulder shrug equal, remainder CN intact  Motor: No drift, no focal arm or leg weakness  Sensory: Intact to light touch  Coordination: FTN intact  Gait: Deferred    Inpt Meds:   Current Facility-Administered Medications   Medication Dose Route Frequency    HYDROcodone-acetaminophen (Norco)  MG per tab 1 tablet  1 tablet Oral Q4H PRN    Or    HYDROcodone-acetaminophen (Norco)  MG per tab 2 tablet  2 tablet Oral Q4H PRN    insulin aspart (NovoLOG) 100 Units/mL FlexPen 1-5 Units  1-5 Units Subcutaneous TID AC and HS    enoxaparin (Lovenox) 40 MG/0.4ML SUBQ injection 40 mg  40 mg Subcutaneous Daily    docusate sodium (Colace) cap 100 mg  100 mg Oral BID    polyethylene glycol (PEG 3350) (Miralax) 17 g oral packet 17 g  17 g Oral Daily PRN    magnesium hydroxide (Milk of Magnesia) 400 MG/5ML oral suspension 30 mL  30 mL Oral Daily PRN    bisacodyl (Dulcolax) 10 MG rectal suppository 10 mg  10 mg Rectal Daily PRN    fleet enema (Fleet) 7-19 GM/118ML rectal enema 133 mL  1  enema Rectal Once PRN    labetalol (Trandate) 5 mg/mL injection 10 mg  10 mg Intravenous Q10 Min PRN    hydrALAzine (Apresoline) 20 mg/mL injection 10 mg  10 mg Intravenous Q2H PRN    metoclopramide (Reglan) 5 mg/mL injection 10 mg  10 mg Intravenous Q8H PRN    niMODipine (Nimotop) cap 60 mg  60 mg Oral 6 times per day    midazolam (Versed) 2 MG/2ML injection 2 mg  2 mg Intravenous Q15 Min PRN    lactated ringers infusion   Intravenous Continuous    morphINE PF 2 MG/ML injection 1 mg  1 mg Intravenous Q2H PRN    Or    morphINE PF 2 MG/ML injection 2 mg  2 mg Intravenous Q2H PRN    ondansetron (Zofran) 4 MG/2ML injection 4 mg  4 mg Intravenous Q6H PRN    niCARdipine in sodium chloride 0.86% (carDENE) 20 mg/200mL infusion premix  5-15 mg/hr Intravenous Continuous PRN    aspirin chewable tab 81 mg  81 mg Oral Daily       Labs:  Lab Results   Component Value Date    WBC 12.9 2024    HGB 9.4 2024    HCT 32.2 2024    .0 2024    CREATSERUM 0.57 2024    BUN 12 2024     2024    K 3.3 2024     2024    CO2 26.0 2024     2024    CA 9.1 2024    PGLU 113 2024       Imagin2024 TCD  CONCLUSION:       1. Elevated velocity of the proximal left MCA with elevated MCA/ICA ratio (3.74) collectively consistent with mild vasospasm.      2. No evidence of arterial vasospasm of the right-sided cervical or intracranial arteries.       2024 CT head  CONCLUSION:    1. No acute intracranial abnormality.  Right PCOM aneurysm clip is noted      8/10/2024 CT head   CONCLUSION:  Interval coiling of the right PCOM aneurysm.      8/10/2024 MRA brain   CONCLUSION:    1. 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 Right PCOM aneurysm measuring 3 millimeters.   2. No acute intracranial abnormality      2024 CTA head + neck    CONCLUSION:  The CTA appears normal, but the caliber of the lateral and 3rd ventricles has increased compared to the prior.  The patient is young, and the rest of the brain does not appear atrophied, and the ventricular size change does not appear to be secondary to brain volume loss and could reflect developing hydrocephalus.  Furthermore there is suggestion of effacement of the basilar cisterns.  Consider MRI follow-up.  Note is also made of prominent adenoid and tonsillar soft tissues.         Assessment/Plan:    SAH- now s/p coil embo of supraclinoid R ICA/A1 JARED aneurysm      ASA 81mg daily   Plan for 7 day MRA (8/17)     SAH Order Set per NCC     Nimotop  TCD daily  NA (139)  Seizure precautions  PT/OT/ST         Dr. Collins aware of the above.        JOSEPHINE Fong  [facility]  8/14/2024, 8:05 AM  Spectre [id number]    Total Critical Care Time Spent: 30 mins

## 2024-08-15 ENCOUNTER — APPOINTMENT (OUTPATIENT)
Dept: ULTRASOUND IMAGING | Facility: HOSPITAL | Age: 39
End: 2024-08-15
Attending: INTERNAL MEDICINE
Payer: MEDICAID

## 2024-08-15 LAB
ANION GAP SERPL CALC-SCNC: 6 MMOL/L (ref 0–18)
BUN BLD-MCNC: 11 MG/DL (ref 9–23)
CALCIUM BLD-MCNC: 9 MG/DL (ref 8.7–10.4)
CHLORIDE SERPL-SCNC: 107 MMOL/L (ref 98–112)
CO2 SERPL-SCNC: 26 MMOL/L (ref 21–32)
CREAT BLD-MCNC: 0.65 MG/DL
EGFRCR SERPLBLD CKD-EPI 2021: 116 ML/MIN/1.73M2 (ref 60–?)
ERYTHROCYTE [DISTWIDTH] IN BLOOD BY AUTOMATED COUNT: 18 %
GLUCOSE BLD-MCNC: 113 MG/DL (ref 70–99)
GLUCOSE BLD-MCNC: 115 MG/DL (ref 70–99)
GLUCOSE BLD-MCNC: 129 MG/DL (ref 70–99)
GLUCOSE BLD-MCNC: 137 MG/DL (ref 70–99)
GLUCOSE BLD-MCNC: 141 MG/DL (ref 70–99)
HCT VFR BLD AUTO: 32.2 %
HGB BLD-MCNC: 9.4 G/DL
MCH RBC QN AUTO: 21.7 PG (ref 26–34)
MCHC RBC AUTO-ENTMCNC: 29.2 G/DL (ref 31–37)
MCV RBC AUTO: 74.2 FL
OSMOLALITY SERPL CALC.SUM OF ELEC: 288 MOSM/KG (ref 275–295)
PLATELET # BLD AUTO: 362 10(3)UL (ref 150–450)
POTASSIUM SERPL-SCNC: 3.6 MMOL/L (ref 3.5–5.1)
POTASSIUM SERPL-SCNC: 3.6 MMOL/L (ref 3.5–5.1)
RBC # BLD AUTO: 4.34 X10(6)UL
SODIUM SERPL-SCNC: 139 MMOL/L (ref 136–145)
WBC # BLD AUTO: 10.2 X10(3) UL (ref 4–11)

## 2024-08-15 PROCEDURE — 93886 INTRACRANIAL COMPLETE STUDY: CPT | Performed by: INTERNAL MEDICINE

## 2024-08-15 PROCEDURE — 99291 CRITICAL CARE FIRST HOUR: CPT | Performed by: INTERNAL MEDICINE

## 2024-08-15 PROCEDURE — 99291 CRITICAL CARE FIRST HOUR: CPT

## 2024-08-15 NOTE — CM/SW NOTE
08/15/24 0800   CM/SW Referral Data   Referral Source    Reason for Referral Discharge planning     HOME SITUATION  Type of Home: Apartment   Home Layout: Two level;Able to live on main level  Stairs to Enter : 0   Stairs to Bedroom: 0   Lives With: Spouse;Family (4 children ages 17 to 7 years old)  Drives: No  Patient Owned Equipment: None  Patient Regularly Uses: Glasses     Prior Level of [place]: Pt reports living in a 1st floor apt with spouse and children. Pt reports she is able to live on main level and kids are on the 2nd floor accessible by a spiral staircase or an elevator if needed to get up to 2nd floor. 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      NN anticipated at discharge. Will meet with pt to determine if HH is needed.    12:07  Discussed with RN during rounds, pt is up ad [facility]. NN identified. RN to contact CM if needs arise.     Gareth Mehta, [place] RN,   [phone]

## 2024-08-15 NOTE — PHYSICAL THERAPY NOTE
Writer spoke to MARJAN Chris - Pt remains up at [facility], and ambulated with nursing staff earlier.  Pt with documented cognitive deficit per SLP - plan for OP SLP, but will re-assess for skilled PT needs in the outpatient setting. Discussed with evaluating PT Jaime

## 2024-08-15 NOTE — PLAN OF CARE
Problem: Patient/Family Goals  Goal: Patient/Family Long Term Goal  Description: Patient's Long Term Goal: To go home    Interventions:  -  - See additional Care Plan goals for specific interventions  Outcome: Progressing  Goal: Patient/Family Short Term Goal  Description: Patient's Short Term Goal: be without pain    Interventions:   - Pain meds as ordered  - assess/reassess pain per protocol  - See additional Care Plan goals for specific interventions  Outcome: Progressing     Problem: METABOLIC/FLUID AND ELECTROLYTES - ADULT  Goal: Glucose maintained within prescribed range  Description: INTERVENTIONS:  - Monitor Blood Glucose as ordered  - Assess for signs and symptoms of hyperglycemia and hypoglycemia  - Administer ordered medications to maintain glucose within target range  - Assess barriers to adequate nutritional intake and initiate nutrition consult as needed  - Instruct patient on self management of diabetes  Outcome: Progressing  Goal: Electrolytes maintained within normal limits  Description: INTERVENTIONS:  - Monitor labs and rhythm and assess patient for signs and symptoms of electrolyte imbalances  - Administer electrolyte replacement as ordered  - Monitor response to electrolyte replacements, including rhythm and repeat lab results as appropriate  - Fluid restriction as ordered  - Instruct patient on fluid and nutrition restrictions as appropriate  Outcome: Progressing  Goal: Hemodynamic stability and optimal renal function maintained  Description: INTERVENTIONS:  - Monitor labs and assess for signs and symptoms of volume excess or deficit  - Monitor intake, output and patient weight  - Monitor urine specific gravity, serum osmolarity and serum sodium as indicated or ordered  - Monitor response to interventions for patient's volume status, including labs, urine output, blood pressure (other measures as available)  - Encourage oral intake as appropriate  - Instruct patient on fluid and nutrition  restrictions as appropriate  Outcome: Progressing     Problem: NEUROLOGICAL - ADULT  Goal: Achieves stable or improved neurological status  Description: INTERVENTIONS  - Assess for and report changes in neurological status  - Initiate measures to prevent increased intracranial pressure  - Maintain blood pressure and fluid volume within ordered parameters to optimize cerebral perfusion and minimize risk of hemorrhage  - Monitor temperature, glucose, and sodium. Initiate appropriate interventions as ordered  Outcome: Progressing  Goal: Absence of seizures  Description: INTERVENTIONS  - Monitor for seizure activity  - Administer anti-seizure medications as ordered  - Monitor neurological status  Outcome: Progressing  Goal: Remains free of injury related to seizure activity  Description: INTERVENTIONS:  - Maintain airway, patient safety  and administer oxygen as ordered  - Monitor patient for seizure activity, document and report duration and description of seizure to MD/LIP  - If seizure occurs, turn patient to side and suction secretions as needed  - Reorient patient post seizure  - Seizure pads on all 4 side rails  - Instruct patient/family to notify RN of any seizure activity  - Instruct patient/family to call for assistance with activity based on assessment  Outcome: Progressing  Goal: Achieves maximal functionality and self care  Description: INTERVENTIONS  - Monitor swallowing and airway patency with patient fatigue and changes in neurological status  - Encourage and assist patient to increase activity and self care with guidance from PT/OT  - Encourage visually impaired, hearing impaired and aphasic patients to use assistive/communication devices  Outcome: Progressing     Problem: PAIN - ADULT  Goal: Verbalizes/displays adequate comfort level or patient's stated pain goal  Description: INTERVENTIONS:  - Encourage pt to monitor pain and request assistance  - Assess pain using appropriate pain scale  - Administer  analgesics based on type and severity of pain and evaluate response  - Implement non-pharmacological measures as appropriate and evaluate response  - Consider cultural and social influences on pain and pain management  - Manage/alleviate anxiety  - Utilize distraction and/or relaxation techniques  - Monitor for opioid side effects  - Notify MD/LIP if interventions unsuccessful or patient reports new pain  - Anticipate increased pain with activity and pre-medicate as appropriate  Outcome: Progressing

## 2024-08-15 NOTE — PROGRESS NOTES
[facility]  Neurocritical Care Progress Note     Greg Uriarte Patient Status:  Inpatient    1985 MRN [id number]   Location [facility] 6NE-A Attending Cheryle Martinez MD   Hosp Day # 5 PCP Leana Gardner MD     Subjective:   Patient is a 39 year old female h/o htn, prediabetes, anxiety and depression p/w severe HA c/f sentinel sah    Hospital course significant for noted onset of severe HA and neck pain/n/v thus came to ED where w/u revealed ct/cta with ventricular enlargement compared to prior and mri with possible aneurysm thus underwent LP and admitted for further eval.     No acute events overnight.    Vitals:   Temp:  [97.3 °F (36.3 °C)-98 °F (36.7 °C)] 97.9 °F (36.6 °C)  Pulse:  [69-93] 79  Resp:  [11-25] 14  BP: (103-153)/(61-93) 126/68  SpO2:  [95 %-100 %] 97 %  Body mass index is 39.18 kg/m².    Intake/Output:    Intake/Output Summary (Last 24 hours) at 8/15/2024 0835  Last data filed at 8/15/2024 0539  Gross per 24 hour   Intake 1312 ml   Output 900 ml   Net 412 ml     Current Meds:  Current Facility-Administered Medications   Medication Dose Route Frequency    albuterol (Ventolin HFA) 108 (90 Base) MCG/ACT inhaler 2 puff  2 puff Inhalation Q4H PRN    HYDROcodone-acetaminophen (Norco)  MG per tab 1 tablet  1 tablet Oral Q4H PRN    Or    HYDROcodone-acetaminophen (Norco)  MG per tab 2 tablet  2 tablet Oral Q4H PRN    insulin aspart (NovoLOG) 100 Units/mL FlexPen 1-5 Units  1-5 Units Subcutaneous TID AC and HS    enoxaparin (Lovenox) 40 MG/0.4ML SUBQ injection 40 mg  40 mg Subcutaneous Daily    docusate sodium (Colace) cap 100 mg  100 mg Oral BID    polyethylene glycol (PEG 3350) (Miralax) 17 g oral packet 17 g  17 g Oral Daily PRN    magnesium hydroxide (Milk of Magnesia) 400 MG/5ML oral suspension 30 mL  30 mL Oral Daily PRN    bisacodyl (Dulcolax) 10 MG rectal suppository 10 mg  10 mg Rectal Daily PRN    fleet enema (Fleet) 7-19 GM/118ML rectal enema 133 mL  1  enema Rectal Once PRN    labetalol (Trandate) 5 mg/mL injection 10 mg  10 mg Intravenous Q10 Min PRN    hydrALAzine (Apresoline) 20 mg/mL injection 10 mg  10 mg Intravenous Q2H PRN    metoclopramide (Reglan) 5 mg/mL injection 10 mg  10 mg Intravenous Q8H PRN    niMODipine (Nimotop) cap 60 mg  60 mg Oral 6 times per day    midazolam (Versed) 2 MG/2ML injection 2 mg  2 mg Intravenous Q15 Min PRN    ondansetron (Zofran) 4 MG/2ML injection 4 mg  4 mg Intravenous Q6H PRN    niCARdipine in sodium chloride 0.86% (carDENE) 20 mg/200mL infusion premix  5-15 mg/hr Intravenous Continuous PRN    aspirin chewable tab 81 mg  81 mg Oral Daily     Physical Examination:   General- No acute distress  CV- RRR  Resp- CTA Bilat  Neuro-  Mental status- awake and alert, regards and follows commands, oriented x3, speech fluent  Cranial nerves- pupils equally round and reactive to light, extraocular muscles intact, face symmetric, visual fields full  Motor- 5/5 throughout  Sens-  Intact to light touch    Diagnostics:   No results found.  Lab Review     Recent Labs   Lab 08/13/24 0444 08/14/24  0439 08/15/24  0456    139 139   K 4.1 3.3* 3.6  3.6    107 107   CO2 23.0 26.0 26.0   * 120* 115*   BUN 12 12 11   CREATSERUM 0.59 0.57 0.65     Recent Labs   Lab 08/13/24  0444 08/14/24  0439 08/15/24  0456   WBC 16.2* 12.9* 10.2   HGB 9.4* 9.4* 9.4*   .0 371.0 362.0     Assesment/Plan:     Neuro:  HA/n/v- in setting of ventricular enlargement and cerebral aneurysm, c/f sentinel/occult sah vs infectious process.   S/p successful coil embolization of supraclinoid R ica terminus/A1 aneurysm per PADILLA 8/10.   Cont sah protocol with neurochecks, nimotop and TCDs-  elevation in R zenobia/pca, exam remains intact will cont to monitor.  PADILLA and NS on c/s.   Cardiac:  Htn- sbp goal 100-180  Pulmonary:  Stable on RA  Renal:  IVFs  GI:  PO as tolerated  Heme/ID:  Afebrile, no leukocytosis.   Endocrine/Rheum:  Monitor glucose, sliding  scale insulin prn  DVT Prophylaxis:  SCD’s, lvx    Goals of the Day: neurochecks   A total of 40 minutes of critical care time (exclusive of billable procedures) was administered to manage and/or prevent neurologic instability. This involved direct patient intervention, complex decision making, and/or extensive discussions with the patient, family, and clinical staff.    Thank you for allowing me to participate in the care of this patient.     Jo Winters MD, [facility]  Medical Director of System Neurosciences  Chief, Section of Neurocritical Care  [facility]

## 2024-08-15 NOTE — PROGRESS NOTES
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 rate 14, weight 235 lb 7.2 oz (106.8 kg), last menstrual period 2024, SpO2 97%, not currently breastfeeding.    Neurologic:   Mental status: Oriented to person, place, and time   Speech: Fluent, no dysarthria  Memory and comprehension: Intact   Cranial Nerves: PERRLA, EOMI, no nystagmus, facial sensation intact, face symmetric, tongue midline, shoulder shrug equal, remainder CN intact  Motor: No drift, no focal arm or leg weakness  Sensory: Intact to light touch  Coordination: FTN intact  Gait: Deferred      Inpt Meds:   Current Facility-Administered Medications   Medication Dose Route Frequency    albuterol (Ventolin HFA) 108 (90 Base) MCG/ACT inhaler 2 puff  2 puff Inhalation Q4H PRN    HYDROcodone-acetaminophen (Norco)  MG per tab 1 tablet  1 tablet Oral Q4H PRN    Or    HYDROcodone-acetaminophen (Norco)  MG per tab 2 tablet  2 tablet Oral Q4H PRN    insulin aspart (NovoLOG) 100 Units/mL FlexPen 1-5 Units  1-5 Units Subcutaneous TID AC and HS    enoxaparin (Lovenox) 40 MG/0.4ML SUBQ injection 40 mg  40 mg Subcutaneous Daily    docusate sodium (Colace) cap 100 mg  100 mg Oral BID    polyethylene glycol (PEG 3350) (Miralax) 17 g oral packet 17 g  17 g Oral Daily PRN    magnesium hydroxide (Milk of Magnesia) 400 MG/5ML oral suspension 30 mL  30 mL Oral Daily PRN    bisacodyl (Dulcolax) 10 MG rectal suppository 10 mg  10 mg Rectal Daily PRN    fleet enema (Fleet) 7-19 GM/118ML rectal enema 133 mL  1 enema Rectal Once PRN    labetalol (Trandate) 5 mg/mL injection 10 mg  10  mg Intravenous Q10 Min PRN    hydrALAzine (Apresoline) 20 mg/mL injection 10 mg  10 mg Intravenous Q2H PRN    metoclopramide (Reglan) 5 mg/mL injection 10 mg  10 mg Intravenous Q8H PRN    niMODipine (Nimotop) cap 60 mg  60 mg Oral 6 times per day    midazolam (Versed) 2 MG/2ML injection 2 mg  2 mg Intravenous Q15 Min PRN    ondansetron (Zofran) 4 MG/2ML injection 4 mg  4 mg Intravenous Q6H PRN    niCARdipine in sodium chloride 0.86% (carDENE) 20 mg/200mL infusion premix  5-15 mg/hr Intravenous Continuous PRN    aspirin chewable tab 81 mg  81 mg Oral Daily       Labs:  Lab Results   Component Value Date    WBC 10.2 08/15/2024    HGB 9.4 08/15/2024    HCT 32.2 08/15/2024    .0 08/15/2024    CREATSERUM 0.65 08/15/2024    BUN 11 08/15/2024     08/15/2024    K 3.6 08/15/2024    K 3.6 08/15/2024     08/15/2024    CO2 26.0 08/15/2024     08/15/2024    CA 9.0 08/15/2024    PGLU 137 08/15/2024       Imagin/15/2024 TCD  CONCLUSION:    1. There is marked elevation of the velocities in the right anterior and posterior cerebral arteries which could indicate more focal or peripheral Vasa spasm.  These have increased since the prior study.   2. MCA velocities and MCA to ICA ratios are in the normal range.     2024 CT head  CONCLUSION:    1. No acute intracranial abnormality.  Right PCOM aneurysm clip is noted      8/10/2024 CT head   CONCLUSION:  Interval coiling of the right PCOM aneurysm.      8/10/2024 MRA brain   CONCLUSION:    1. 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 Right PCOM aneurysm measuring 3 millimeters.   2. No acute intracranial abnormality      2024 CTA head + neck   CONCLUSION:  The CTA appears normal, but the caliber of the lateral and 3rd ventricles has increased compared to the prior.  The patient is young, and the rest of the brain does not appear atrophied,  and the ventricular size change does not appear to be secondary to brain volume loss and could reflect developing hydrocephalus.  Furthermore there is suggestion of effacement of the basilar cisterns. Consider MRI follow-up.  Note is also made of prominent adenoid and tonsillar soft tissues.           Assessment/Plan:    SAH- now s/p coil embo of supraclinoid R ICA/A1 JARED aneurysm      ASA 81mg daily   Plan for 7 day MRA (8/17)     SAH Order Set per NCC     Nimotop  TCD daily  NA (139)  Seizure precautions  PT/OT/ST         Dr. Collins aware of the above.         JOSEPHINE Fong  [facility]  8/15/2024, 8:06 AM  Spectre [id number]    Total Critical Care Time Spent: 30 mins

## 2024-08-15 NOTE — PLAN OF CARE
Assumed care of Greg around 0715. NSR per tele, vitals stable. Neuro status remains unchanged- tingling in toes and photosensitivity continues. She does endorse the \"wavy vision\" when she first wakes up. She was up ambulating the halls twice today, making it farther each walk. Her goal is three walks tomorrow. Pain somewhat managed with Norco, still complains of her headaches and BLE muscle stiffness. Updated pt on plan of care, questions answered.      Problem: Patient/Family Goals  Goal: Patient/Family Long Term Goal  Description: Patient's Long Term Goal: To go home    Interventions:  -  - See additional Care Plan goals for specific interventions  Outcome: Progressing  Goal: Patient/Family Short Term Goal  Description: Patient's Short Term Goal: be without pain    Interventions:   - Pain meds as ordered  - assess/reassess pain per protocol  - See additional Care Plan goals for specific interventions  Outcome: Progressing     Problem: METABOLIC/FLUID AND ELECTROLYTES - ADULT  Goal: Glucose maintained within prescribed range  Description: INTERVENTIONS:  - Monitor Blood Glucose as ordered  - Assess for signs and symptoms of hyperglycemia and hypoglycemia  - Administer ordered medications to maintain glucose within target range  - Assess barriers to adequate nutritional intake and initiate nutrition consult as needed  - Instruct patient on self management of diabetes  Outcome: Progressing  Goal: Electrolytes maintained within normal limits  Description: INTERVENTIONS:  - Monitor labs and rhythm and assess patient for signs and symptoms of electrolyte imbalances  - Administer electrolyte replacement as ordered  - Monitor response to electrolyte replacements, including rhythm and repeat lab results as appropriate  - Fluid restriction as ordered  - Instruct patient on fluid and nutrition restrictions as appropriate  Outcome: Progressing  Goal: Hemodynamic stability and optimal renal function maintained  Description:  INTERVENTIONS:  - Monitor labs and assess for signs and symptoms of volume excess or deficit  - Monitor intake, output and patient weight  - Monitor urine specific gravity, serum osmolarity and serum sodium as indicated or ordered  - Monitor response to interventions for patient's volume status, including labs, urine output, blood pressure (other measures as available)  - Encourage oral intake as appropriate  - Instruct patient on fluid and nutrition restrictions as appropriate  Outcome: Progressing     Problem: NEUROLOGICAL - ADULT  Goal: Achieves stable or improved neurological status  Description: INTERVENTIONS  - Assess for and report changes in neurological status  - Initiate measures to prevent increased intracranial pressure  - Maintain blood pressure and fluid volume within ordered parameters to optimize cerebral perfusion and minimize risk of hemorrhage  - Monitor temperature, glucose, and sodium. Initiate appropriate interventions as ordered  Outcome: Progressing  Goal: Absence of seizures  Description: INTERVENTIONS  - Monitor for seizure activity  - Administer anti-seizure medications as ordered  - Monitor neurological status  Outcome: Progressing  Goal: Remains free of injury related to seizure activity  Description: INTERVENTIONS:  - Maintain airway, patient safety  and administer oxygen as ordered  - Monitor patient for seizure activity, document and report duration and description of seizure to MD/LIP  - If seizure occurs, turn patient to side and suction secretions as needed  - Reorient patient post seizure  - Seizure pads on all 4 side rails  - Instruct patient/family to notify RN of any seizure activity  - Instruct patient/family to call for assistance with activity based on assessment  Outcome: Progressing  Goal: Achieves maximal functionality and self care  Description: INTERVENTIONS  - Monitor swallowing and airway patency with patient fatigue and changes in neurological status  - Encourage and  assist patient to increase activity and self care with guidance from PT/OT  - Encourage visually impaired, hearing impaired and aphasic patients to use assistive/communication devices  Outcome: Progressing     Problem: PAIN - ADULT  Goal: Verbalizes/displays adequate comfort level or patient's stated pain goal  Description: INTERVENTIONS:  - Encourage pt to monitor pain and request assistance  - Assess pain using appropriate pain scale  - Administer analgesics based on type and severity of pain and evaluate response  - Implement non-pharmacological measures as appropriate and evaluate response  - Consider cultural and social influences on pain and pain management  - Manage/alleviate anxiety  - Utilize distraction and/or relaxation techniques  - Monitor for opioid side effects  - Notify MD/LIP if interventions unsuccessful or patient reports new pain  - Anticipate increased pain with activity and pre-medicate as appropriate  Outcome: Progressing

## 2024-08-15 NOTE — DIETARY NOTE
[facility]   part of [facility]   CLINICAL NUTRITION    Greg Uriarte     Admitting diagnosis:  Acute intractable headache, unspecified headache type [R51.9]    Ht:  5'5\"  Wt: 106.8 kg (235 lb 7.2 oz).   Body mass index is 39.18 kg/m².  IBW: 56.8 kg    Wt Readings from Last 6 Encounters:   08/15/24 106.8 kg (235 lb 7.2 oz)   05/05/24 96.2 kg (212 lb)   02/11/23 104.3 kg (230 lb)   09/01/21 107.7 kg (237 lb 6 oz)   08/12/21 106.6 kg (235 lb)   04/28/21 109.3 kg (240 lb 15.4 oz)        Labs/Meds reviewed    Diet:       Procedures    Regular/General diet Is Patient on Accuchecks? Yes     Percent Meals Eaten (last 3 days)       Date/Time Percent Meals Eaten (%)    08/13/24 1000 100 %    08/14/24 1200 100 %    08/14/24 2000 100 %    08/15/24 0800 100 %            Pt chart reviewed d/t LOS.  Patient reports good appetite at this time.  Nursing notes reports Percent Meals Eaten (%): 100 % intake for last meal.  Tolerating po diet without diarrhea, emesis, or constipation.   No significant weight changes noted.     PMH includes GDM, HTN, Sickle Cell Trait. 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 S/p Coil emobolization.  Pt screened for LOS.  Pt tolerating 100% of meals (omelet, Occitan toast, fruit, juice for breakfast this AM). No n/v/d reported. Last BM 8/14. No wt loss per EMR review    Patient is at low nutrition risk at this time.    Please consult if patient status changes or nutrition issues arise.    Sudha Boyce RD, LDN, [facility]  Clinical Dietitian  Phone [phone]

## 2024-08-15 NOTE — PROGRESS NOTES
Attempted to see patient  Patient sleeping soundly  TCD's from yesterday showed mild vasospasm  Continue to monitor  Following

## 2024-08-15 NOTE — PLAN OF CARE
Assumed care at around 2100. Q2/Q3 neuro checks. Neuro intact w/ some photosensitivity and tingling to her toes. Maintaining SBP < 180. Complaints of headaches relieved by prn norco. Pt ambulating and voiding in bathroom. Patient updated on plan of care. Call light within reach.

## 2024-08-15 NOTE — SLP NOTE
SPEECH/LANGUAGE/COGNITIVE EVALUATION - INPATIENT    Admission Date: 8/9/2024  Evaluation Date: 08/15/24    Reason for Referral: Stroke protocol    ASSESSMENT & PLAN   ASSESSMENT & IMPRESSION  Patient seen for cognitive communication evaluation as per protocol. 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  Patient has 4 children at home. Patient reported difficulty with memory and attention/concentration ability.  Patient motivated and agreeable for this session.   Assessment(s) Administered: SLUMS; Informal Assessments    Patient was administered the [facility] Mental Status Exam (SLUMS) which is an exam for detecting mild cognitive impairment and dementia. Patient achieved a raw score of 18/30  (Normal= 27-30; Mild= 21-26; Dementia= 1-20) which was within neurocognitive impairment range. Deficits Identified: Attention concentration;Memory;Other (Comment: decreased clock drawing accuracy).  Patient exhibited intact language faculties with no overt word finding or paraphasic errors. Expressive language content was WNL.  No evidence of motor speech impairment as patient exhibited 100% speech intelligibility across all contexts.  Patient achieved 90% acc in short paragraph comprehension and responded to demands within min complex conversational without difficulty. Patient demonstrated errors in short term recall with only 20% acc (1/5) unrelated items after filled delay despite 100% acc immediately.  Patient achieved 0% on clock drawing task with questionable errors in visual-spatial function - further assessment is recommended.    Education initiated with patient regarding role/purpose of SLP and post acute speech language therapy follow-up for cognitive-communication retraining.  Patient expressed understanding and agreement.  SLP to follow while in house for cog-comm tx, as able.     Aphasic Depression Rating Scale Administered: Not applicable      Patient Experiencing  Pain: Yes  Pain Ratin   Pain Location: head  Pain Control: PO meds  Nursing Aware of Pain?: Yes        GOALS  Goal #1 Complete cognitive linguistic evaluation MET   Goal #2 The patient will scan/track from left to right with initial cues with 80 % accuracy within 2 2 session(s).     In Progress   Goal #3 The patient will express short term recall strategies (W.R.A.P) after initial review/education with 100% acc In Progress   Goal #4 The patient will utilize learned strategies within structured task to achieve 80% acc within 2/2 sessions   In Progress   Goal #5 The patient will complete divided attention task with 80% acc with min cues for strategies, within 3/3 sessions   In Progress     Prior Living Situation: Home with spouse (mother of 4 children)  Prior Level of Function: Independent      Imaging Results: US Transcranial arteries today:  1. There is marked elevation of the velocities in the right anterior and posterior cerebral arteries which could indicate more focal or peripheral Vasa spasm.  These have increased since the prior study.   2. MCA velocities and MCA to ICA ratios are in the normal range.     MRI BRAIN 8/10/24:  1. Right PCOM aneurysm measuring 3 millimeters.   2. No acute intracranial abnormality       Patient/Family Goals: \"Will you come back?\"    Interdisciplinary Communication: Discussed with RN  Disussed with other staff    Patient and/or caregiver has been informed and has taken part in this evaluation and plan of treatment and have been advised and agree on the findings and goals.      FOLLOW UP  Treatment Plan/Recommendations: Cognitive communication therapy  Number of Visits to Meet Established Goals: 3  Follow Up Needed (Documentation Required): Yes      Thank you for your referral.  If you have any questions please contact Kristin Miller, LYLY Miller MS CCC-SLP/L, pager [phone]  Speech-LanguagePathologist

## 2024-08-16 ENCOUNTER — APPOINTMENT (OUTPATIENT)
Dept: MRI IMAGING | Facility: HOSPITAL | Age: 39
End: 2024-08-16
Payer: MEDICAID

## 2024-08-16 ENCOUNTER — APPOINTMENT (OUTPATIENT)
Dept: ULTRASOUND IMAGING | Facility: HOSPITAL | Age: 39
End: 2024-08-16
Attending: INTERNAL MEDICINE
Payer: MEDICAID

## 2024-08-16 LAB
ANION GAP SERPL CALC-SCNC: 4 MMOL/L (ref 0–18)
BUN BLD-MCNC: 12 MG/DL (ref 9–23)
CALCIUM BLD-MCNC: 9.3 MG/DL (ref 8.7–10.4)
CHLORIDE SERPL-SCNC: 106 MMOL/L (ref 98–112)
CO2 SERPL-SCNC: 27 MMOL/L (ref 21–32)
CREAT BLD-MCNC: 0.63 MG/DL
EGFRCR SERPLBLD CKD-EPI 2021: 117 ML/MIN/1.73M2 (ref 60–?)
ERYTHROCYTE [DISTWIDTH] IN BLOOD BY AUTOMATED COUNT: 17.6 %
GLUCOSE BLD-MCNC: 103 MG/DL (ref 70–99)
GLUCOSE BLD-MCNC: 108 MG/DL (ref 70–99)
GLUCOSE BLD-MCNC: 122 MG/DL (ref 70–99)
GLUCOSE BLD-MCNC: 126 MG/DL (ref 70–99)
GLUCOSE BLD-MCNC: 168 MG/DL (ref 70–99)
HCT VFR BLD AUTO: 30 %
HGB BLD-MCNC: 8.9 G/DL
HSV-1 DNA DETECTION: NEGATIVE
HSV-1 DNA DETECTION: NEGATIVE
HSV-2 DNA DETECTION: NEGATIVE
HSV-2 DNA DETECTION: NEGATIVE
MAGNESIUM SERPL-MCNC: 1.9 MG/DL (ref 1.6–2.6)
MCH RBC QN AUTO: 21.6 PG (ref 26–34)
MCHC RBC AUTO-ENTMCNC: 29.7 G/DL (ref 31–37)
MCV RBC AUTO: 72.8 FL
OSMOLALITY SERPL CALC.SUM OF ELEC: 285 MOSM/KG (ref 275–295)
PLATELET # BLD AUTO: 341 10(3)UL (ref 150–450)
POTASSIUM SERPL-SCNC: 4.2 MMOL/L (ref 3.5–5.1)
RBC # BLD AUTO: 4.12 X10(6)UL
SODIUM SERPL-SCNC: 137 MMOL/L (ref 136–145)
WBC # BLD AUTO: 9.2 X10(3) UL (ref 4–11)

## 2024-08-16 PROCEDURE — 93886 INTRACRANIAL COMPLETE STUDY: CPT | Performed by: INTERNAL MEDICINE

## 2024-08-16 PROCEDURE — 99291 CRITICAL CARE FIRST HOUR: CPT | Performed by: INTERNAL MEDICINE

## 2024-08-16 PROCEDURE — 99291 CRITICAL CARE FIRST HOUR: CPT

## 2024-08-16 RX ORDER — OXYCODONE AND ACETAMINOPHEN 5; 325 MG/1; MG/1
1.5 TABLET ORAL EVERY 4 HOURS PRN
Status: DISCONTINUED | OUTPATIENT
Start: 2024-08-16 | End: 2024-08-21

## 2024-08-16 RX ORDER — KETOROLAC TROMETHAMINE 15 MG/ML
15 INJECTION, SOLUTION INTRAMUSCULAR; INTRAVENOUS EVERY 6 HOURS PRN
Status: DISCONTINUED | OUTPATIENT
Start: 2024-08-16 | End: 2024-08-18

## 2024-08-16 RX ORDER — HYDROMORPHONE HYDROCHLORIDE 1 MG/ML
0.4 INJECTION, SOLUTION INTRAMUSCULAR; INTRAVENOUS; SUBCUTANEOUS EVERY 2 HOUR PRN
Status: DISCONTINUED | OUTPATIENT
Start: 2024-08-16 | End: 2024-08-21

## 2024-08-16 RX ORDER — HYDROMORPHONE HYDROCHLORIDE 1 MG/ML
0.2 INJECTION, SOLUTION INTRAMUSCULAR; INTRAVENOUS; SUBCUTANEOUS EVERY 2 HOUR PRN
Status: DISCONTINUED | OUTPATIENT
Start: 2024-08-16 | End: 2024-08-21

## 2024-08-16 RX ORDER — ACETAMINOPHEN 325 MG/1
650 TABLET ORAL EVERY 6 HOURS PRN
Status: DISCONTINUED | OUTPATIENT
Start: 2024-08-16 | End: 2024-08-23

## 2024-08-16 RX ORDER — MORPHINE SULFATE 2 MG/ML
2 INJECTION, SOLUTION INTRAMUSCULAR; INTRAVENOUS ONCE
Status: COMPLETED | OUTPATIENT
Start: 2024-08-16 | End: 2024-08-16

## 2024-08-16 NOTE — PROGRESS NOTES
[facility]   part of [facility] Hospitalist Progress Note     Greg Uriarte Patient Status:  Inpatient    1985 MRN [id number]   Location [facility] 6NE-A Attending Glenn Hoffman, DO   Hosp Day # 6 PCP Leana Gardner MD     Follow Up:  The encounter diagnosis was Acute intractable headache, unspecified headache type.    Subjective:     Patient seen and examined this morning at bedside. Having severe back pain, and some \"wavy vision\" otherwise no acute events overnight. No new complaints. MRA planned for tomorrow    Objective:    Review of Systems:   10 point ROS completed and was negative, except for pertinent positive and negatives stated in subjective.    Vital signs:  Temp:  [97.5 °F (36.4 °C)-98.6 °F (37 °C)] 97.6 °F (36.4 °C)  Pulse:  [] 90  Resp:  [12-21] 19  BP: ()/() 121/64  SpO2:  [94 %-100 %] 99 %    Physical Exam:    General: No acute distress.   Respiratory: Clear to auscultation bilaterally. No wheezes. No rhonchi.  Cardiovascular: S1, S2. Regular rate and rhythm. No murmurs.  Abdomen: Soft, nontender, nondistended.  Positive bowel sounds. No rebound or guarding.  Extremities: No edema.  Neuro:  Grossly non focal, no motor deficits.        Diagnostic Data:    Labs:  Recent Labs   Lab 08/10/24  1640 24  0415 24  0512 24  0444 24  0439 08/15/24  0456 24  0439   WBC  --    < > 13.4* 16.2* 12.9* 10.2 9.2   HGB  --    < > 9.2* 9.4* 9.4* 9.4* 8.9*   MCV  --    < > 72.9* 74.8* 74.5* 74.2* 72.8*   PLT  --    < > 339.0 391.0 371.0 362.0 341.0   INR 1.06  --   --   --   --   --   --     < > = values in this interval not displayed.       Recent Labs   Lab 24  2156 24  0415 24  0439 08/15/24  0456 24  0439   *   < > 120* 115* 122*   BUN 9   < > 12 11 12   CREATSERUM 0.61   < > 0.57 0.65 0.63   CA 9.5   < > 9.1 9.0 9.3   ALB 4.8  --   --   --   --       < > 139 139 137   K 3.2*   < > 3.3*  3.6  3.6 4.2      < > 107 107 106   CO2 22.0   < > 26.0 26.0 27.0   ALKPHO 111*  --   --   --   --    AST 13  --   --   --   --    ALT 14  --   --   --   --    BILT 0.3  --   --   --   --    TP 7.5  --   --   --   --     < > = values in this interval not displayed.     Assessment & Plan:      R ICA/JARED Aneurysm c/b Rupture  SAH s/p Coil Embolization  NSGY/Neurology on consult  Meningitis ruled out  US today: increasing velocities of L MCA w/ developing L-vasospasm  Plan:  - Continue Nimodipine as directed by NSGY/Neurology  - Neurochecks per protocol  - Continue TCD daily  - Continue ASA  - Off IVF  - MRA planned for D7 (8/17)  - No longer on IV abx, cultures negative, appreciate ID recs    HTN  - BP parameters as per Neuro    Sciatica  - PRN pain meds, Lidocaine patch  - Continued PT/OT    Supplementary Documentation:     Quality:  DVT Prophylaxis: Lovenox  CODE status: Full    Glenn Hoffman D.O.  [facility] Emergencyist

## 2024-08-16 NOTE — PROGRESS NOTES
[facility]  Interventional Neuroradiology Progress Note    Greg Uriarte Patient Status:  Inpatient    1985 MRN [id number]   Location [facility] 6NE-A Attending Glenn Hoffman, DO   Hosp Day # 6 PCP Leana Gardner MD     POD # 6 coil embo of supraclinoid R ICA/A1 JARED aneurysm       Subjective: Sitting up in chair. Complaints of headache and severe back pain with associated pain to BLE. Reports episode of \"wavy vision\" when awaking this am.       Objective/Physical Exam:    Vital Signs:  Blood pressure 128/78, pulse 77, temperature 97.8 °F (36.6 °C), temperature source Temporal, resp. rate 15, weight 235 lb 7.2 oz (106.8 kg), last menstrual period 2024, SpO2 100%, not currently breastfeeding.    Neurologic:   Mental status: Oriented to person, place, and time   Speech: Fluent, no dysarthria  Memory and comprehension: Intact   Cranial Nerves: PERRL, EOMI, no nystagmus, facial sensation intact, face symmetric, tongue midline, shoulder shrug equal, remainder CN intact  Motor: No drift, no focal arm or leg weakness  Sensory: Intact to light touch  Coordination: FTN intact  Gait: Deferred    Inpt Meds:   Current Facility-Administered Medications   Medication Dose Route Frequency    albuterol (Ventolin HFA) 108 (90 Base) MCG/ACT inhaler 2 puff  2 puff Inhalation Q4H PRN    HYDROcodone-acetaminophen (Norco)  MG per tab 1 tablet  1 tablet Oral Q4H PRN    Or    HYDROcodone-acetaminophen (Norco)  MG per tab 2 tablet  2 tablet Oral Q4H PRN    insulin aspart (NovoLOG) 100 Units/mL FlexPen 1-5 Units  1-5 Units Subcutaneous TID AC and HS    enoxaparin (Lovenox) 40 MG/0.4ML SUBQ injection 40 mg  40 mg Subcutaneous Daily    docusate sodium (Colace) cap 100 mg  100 mg Oral BID    polyethylene glycol (PEG 3350) (Miralax) 17 g oral packet 17 g  17 g Oral Daily PRN    magnesium hydroxide (Milk of Magnesia) 400 MG/5ML oral suspension 30 mL  30 mL Oral Daily PRN    bisacodyl (Dulcolax) 10 MG rectal  suppository 10 mg  10 mg Rectal Daily PRN    fleet enema (Fleet) 7-19 GM/118ML rectal enema 133 mL  1 enema Rectal Once PRN    labetalol (Trandate) 5 mg/mL injection 10 mg  10 mg Intravenous Q10 Min PRN    hydrALAzine (Apresoline) 20 mg/mL injection 10 mg  10 mg Intravenous Q2H PRN    metoclopramide (Reglan) 5 mg/mL injection 10 mg  10 mg Intravenous Q8H PRN    niMODipine (Nimotop) cap 60 mg  60 mg Oral 6 times per day    midazolam (Versed) 2 MG/2ML injection 2 mg  2 mg Intravenous Q15 Min PRN    ondansetron (Zofran) 4 MG/2ML injection 4 mg  4 mg Intravenous Q6H PRN    niCARdipine in sodium chloride 0.86% (carDENE) 20 mg/200mL infusion premix  5-15 mg/hr Intravenous Continuous PRN    aspirin chewable tab 81 mg  81 mg Oral Daily       Labs:  Lab Results   Component Value Date    WBC 9.2 2024    HGB 8.9 2024    HCT 30.0 2024    .0 2024    CREATSERUM 0.63 2024    BUN 12 2024     2024    K 4.2 2024     2024    CO2 27.0 2024     2024    CA 9.3 2024    MG 1.9 2024    PGLU 108 2024       Imagin2024 TCD  CONCLUSION:  Increasing velocities involving the left MCA, with increase in the MCA/ICA ratio on the left, currently 2.73, with concern for developing left-sided vasospasm.      2024 CT head  CONCLUSION:    1. No acute intracranial abnormality.  Right PCOM aneurysm clip is noted      8/10/2024 CT head   CONCLUSION:  Interval coiling of the right PCOM aneurysm.      8/10/2024 MRA brain   CONCLUSION:    1. 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 Right PCOM aneurysm measuring 3 millimeters.   2. No acute intracranial abnormality      2024 CTA head + neck   CONCLUSION:  The CTA appears normal, but the caliber of the lateral and 3rd ventricles has increased compared to the prior.  The  patient is young, and the rest of the brain does not appear atrophied, and the ventricular size change does not appear to be secondary to brain volume loss and could reflect developing hydrocephalus.  Furthermore there is suggestion of effacement of the basilar cisterns. Consider MRI follow-up.  Note is also made of prominent adenoid and tonsillar soft tissues.         Assessment/Plan:    SAH- now s/p coil embo of supraclinoid R ICA/A1 JARED aneurysm      ASA 81mg daily   Plan for 7 day MRA (8/17)     SAH Order Set per NCC     Nimotop  TCD daily  NA (137)  Seizure precautions  PT/OT/ST         Dr. Collins aware of the above.     Kym Olivo APRJEAN  [facility]  8/16/2024, 8:05 AM  Spectre [id number]    Total Critical Care Time Spent: 30 mins

## 2024-08-16 NOTE — OCCUPATIONAL THERAPY NOTE
Attempted to see the patient for OT session. Patient prefers to rest, reporting back pain.  Pt was just given pain medication. Pt worked with PT on bedside exercises. Patient agrees with therapy following up with her on Monday.  Spoke with RN.

## 2024-08-16 NOTE — PROGRESS NOTES
INPATIENT PROGRESS NOTE    Assessment:   Greg Uriarte in room 6607/6607-A, is a 39 year old female, Hospital Day ( LOS: 6 days ) hospitalized for Acute intractable headache, unspecified headache type.      Status post 8/10/2024: Dr. Collins: Coil embolization of supraclinoid right internal carotid artery/A1 anterior cerebral artery aneurysm     The patient's other hospital problems include:   Active Hospital Problems    Aneurysmal subarachnoid hemorrhage (HCC)      Cerebral aneurysm rupture (HCC)      *Acute intractable headache, unspecified headache type        Plan:     1.  Patient with headache, lower back and lower extremity complaints, likely secondary to irritation from SAH.  Recommend NSAID treatment, if okay from a neuro IR standpoint.  2.  Medical management per hospitalist and critical care  3.  Daily TCD's, AEDs and BP parameters per neurocritical care and neuro IR  4.  Continue to monitor for signs/symptoms of hydrocephalus  5.  Discussed with Dr. Portillo    The patient was seen and examined with Dr. Portillo.  I am acting as scribe.  Patient agreed to the plan, verbalized understanding and was very appreciative.    Subjective:   Patient endorses a headache as well as lower back and lower extremity discomfort.  Her symptoms are similar to yesterday.  No new neurologic complaints.    Objective:   I&O: I/O last 3 completed shifts:  In: 1356 [P.O.:1356]  Out: 1300 [Urine:1300]     VITALS: BP (!) 143/100   Pulse 92   Temp 97.6 °F (36.4 °C) (Temporal)   Resp 12   Wt 235 lb 7.2 oz (106.8 kg)   LMP 2024 (Exact Date)   SpO2 100%   BMI 39.18 kg/m²  Temp (24hrs), Av °F (36.7 °C), Min:97.5 °F (36.4 °C), Max:98.6 °F (37 °C)     DVT Evaluation:  SCDs on     Clinical exam:  The patient is awake, alert and orientated.  Speech fluent.  Comprehension intact.  Answer questions appropriately.  Easily follows commands.  Breathing easy and even.  Skin warm and dry.  Sitting up comfortably in the recliner  chair.  Moving bilateral upper and lower extremities, proximally and distally, spontaneously to full resistance    Imaging reviewed:    Study Result    Narrative   PROCEDURE:  US TRANSCRANIAL ARTERIES COMPLETE  (CPT=93886)     COMPARISON:  GELA , , US TRANSCRANIAL ARTERIES COMPLETE  (CPT=93886), 8/15/2024, 6:15 AM.     INDICATIONS:  Subarachnoid Hemorrhage     TECHNIQUE:  Transcranial Doppler ultrasound of the intracranial arteries was performed utilizing spectral waveform analysis.     PATIENT STATED HISTORY: (As transcribed by Technologist)           FINDINGS:    The right-sided velocities in cm/s are as follows:  ICA CERVICAL:  34  ICA TERMINUS:  33  MCA PROXIMAL:  45  MCA MID:  47  MCA DISTAL:  26  MCA/ICA RATIO:  1.39  JARED PROXIMAL:  24  JARED DISTAL:  43  PCA PROXIMAL:  45  PCA DISTAL:  45  VERTEBRAL:  39     The left-sided velocities in cm/s are as follows:  ICA CERVICAL:  43  ICA TERMINUS:  47  MCA PROXIMAL:  118  MCA MID:  114  MCA DISTAL:  108  MCA/ICA RATIO:  2.73  JARED PROXIMAL:  46  JARED DISTAL:  73  PCA PROXIMAL:  55  PCA DISTAL:  62  VERTEBRAL:  36     BASILAR  ARTERY velocity in cm/s:  27     MCA mean velocities/ratios:  mild: 120-150, 3-4.5  moderate: 150-200, 4.5-6  severe: >200, >6                   Impression   CONCLUSION:  Increasing velocities involving the left MCA, with increase in the MCA/ICA ratio on the left, currently 2.73, with concern for developing left-sided vasospasm.        LOCATION:  [facility]        Dictated by (CST): Dereje Moore MD on 8/16/2024 at 7:31 AM      Finalized by (CST): Dereje Moore MD on 8/16/2024 at 7:32 AM         Olive Knutson M.S., PA-C  [facility] [address], [address] [address]  [phone]  8/16/2024 9:51 AM    Dragon speech recognition software was used to prepare this note. If a word or phrase is confusing, it is likely due to a failure of recognition. Please contact me with any questions or  clarifications.    Is this a shared or split note between Advanced Practice Provider and Physician? Yes

## 2024-08-16 NOTE — PHYSICAL THERAPY NOTE
IP PT following. Consulted c RN who states pt received pain meds recently and pain patches to back. Therapist encouraged pt in OOB mobility and educated pt on benefits of mobility to prevent further deconditioning. Therapist reviewed gentle ROM BLE B knees and hips to tolerance for joint mobility, circulation and to reduce muscle tightness. Therapy will continue to follow while in house. Pt states she prefers for PT to f/u on Monday.

## 2024-08-16 NOTE — PROGRESS NOTES
[facility]  Neurocritical Care Progress Note     Greg Uriarte Patient Status:  Inpatient    1985 MRN [id number]   Location [facility] 6NE-A Attending Cheryle Martinez MD   Hosp Day # 6 PCP Leana Gardner MD     Subjective:   Patient is a 39 year old female h/o htn, prediabetes, anxiety and depression p/w severe HA c/f sentinel sah    Hospital course significant for noted onset of severe HA and neck pain/n/v thus came to ED where w/u revealed ct/cta with ventricular enlargement compared to prior and mri with possible aneurysm thus underwent LP and admitted for further eval.     No acute events overnight.    Vitals:   Temp:  [97.5 °F (36.4 °C)-98.6 °F (37 °C)] 97.8 °F (36.6 °C)  Pulse:  [] 77  Resp:  [12-26] 15  BP: ()/(36-91) 128/78  SpO2:  [94 %-100 %] 100 %  Body mass index is 39.18 kg/m².    Intake/Output:    Intake/Output Summary (Last 24 hours) at 2024 0857  Last data filed at 2024 0400  Gross per 24 hour   Intake 880 ml   Output 800 ml   Net 80 ml     Current Meds:  Current Facility-Administered Medications   Medication Dose Route Frequency    albuterol (Ventolin HFA) 108 (90 Base) MCG/ACT inhaler 2 puff  2 puff Inhalation Q4H PRN    HYDROcodone-acetaminophen (Norco)  MG per tab 1 tablet  1 tablet Oral Q4H PRN    Or    HYDROcodone-acetaminophen (Norco)  MG per tab 2 tablet  2 tablet Oral Q4H PRN    insulin aspart (NovoLOG) 100 Units/mL FlexPen 1-5 Units  1-5 Units Subcutaneous TID AC and HS    enoxaparin (Lovenox) 40 MG/0.4ML SUBQ injection 40 mg  40 mg Subcutaneous Daily    docusate sodium (Colace) cap 100 mg  100 mg Oral BID    polyethylene glycol (PEG 3350) (Miralax) 17 g oral packet 17 g  17 g Oral Daily PRN    magnesium hydroxide (Milk of Magnesia) 400 MG/5ML oral suspension 30 mL  30 mL Oral Daily PRN    bisacodyl (Dulcolax) 10 MG rectal suppository 10 mg  10 mg Rectal Daily PRN    fleet enema (Fleet) 7-19 GM/118ML rectal enema 133 mL  1  enema Rectal Once PRN    labetalol (Trandate) 5 mg/mL injection 10 mg  10 mg Intravenous Q10 Min PRN    hydrALAzine (Apresoline) 20 mg/mL injection 10 mg  10 mg Intravenous Q2H PRN    metoclopramide (Reglan) 5 mg/mL injection 10 mg  10 mg Intravenous Q8H PRN    niMODipine (Nimotop) cap 60 mg  60 mg Oral 6 times per day    midazolam (Versed) 2 MG/2ML injection 2 mg  2 mg Intravenous Q15 Min PRN    ondansetron (Zofran) 4 MG/2ML injection 4 mg  4 mg Intravenous Q6H PRN    niCARdipine in sodium chloride 0.86% (carDENE) 20 mg/200mL infusion premix  5-15 mg/hr Intravenous Continuous PRN    aspirin chewable tab 81 mg  81 mg Oral Daily     Physical Examination:   General- No acute distress  CV- RRR  Resp- CTA Bilat  Neuro-  Mental status- awake and alert, regards and follows commands, oriented x3, speech fluent  Cranial nerves- pupils equally round and reactive to light, extraocular muscles intact, face symmetric, visual fields full  Motor- 5/5 throughout  Sens-  Intact to light touch    Diagnostics:   US TRANSCRANIAL ARTERIES COMPLETE  (CPT=93886)    Result Date: 8/16/2024  CONCLUSION:  Increasing velocities involving the left MCA, with increase in the MCA/ICA ratio on the left, currently 2.73, with concern for developing left-sided vasospasm.   LOCATION:  [facility]   Dictated by (CST): Dereje Moore MD on 8/16/2024 at 7:31 AM     Finalized by (CST): Dereje Moore MD on 8/16/2024 at 7:32 AM      Lab Review     Recent Labs   Lab 08/14/24  0439 08/15/24  0456 08/16/24  0439    139 137   K 3.3* 3.6  3.6 4.2    107 106   CO2 26.0 26.0 27.0   * 115* 122*   BUN 12 11 12   CREATSERUM 0.57 0.65 0.63     Recent Labs   Lab 08/14/24  0439 08/15/24  0456 08/16/24  0439   WBC 12.9* 10.2 9.2   HGB 9.4* 9.4* 8.9*   .0 362.0 341.0     Assesment/Plan:     Neuro:  HA/n/v- in setting of ventricular enlargement and cerebral aneurysm, c/f sentinel/occult sah vs infectious process.   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   Cont sah protocol with neurochecks, nimotop and TCDs.  PADILLA and NS on c/s.   Cardiac:  Htn- sbp goal 100-180  Pulmonary:  Stable on RA  Renal:  IVFs  GI:  PO as tolerated  Heme/ID:  Afebrile, no leukocytosis.   Endocrine/Rheum:  Monitor glucose, sliding scale insulin prn  DVT Prophylaxis:  SCD’s, lvx    Goals of the Day: neurochecks   A total of 40 minutes of critical care time (exclusive of billable procedures) was administered to manage and/or prevent neurologic instability. This involved direct patient intervention, complex decision making, and/or extensive discussions with the patient, family, and clinical staff.    Thank you for allowing me to participate in the care of this patient.     Jo Winters MD, [facility]  Medical Director of System Neurosciences  Chief, Section of Neurocritical Care  Critical access hospital Neuroscience [place]

## 2024-08-16 NOTE — PLAN OF CARE
Assumed cares about 1930, VS stable on RA overnight. Pain managed with PRN medications and comfort measures. Neuro checks Q2h/Q3h, photosensitivity present, pupils equal and reactive, BLE tingling. Headache and BLE pain overnight, PRN medications given, morphine given x1 for instance of breakthrough pain. Ambulated to bathroom with no difficulty, walker used per patient request with \"wavy vision\" upon waking. No further concerns at this time, patient updated family herself.    Problem: Patient/Family Goals  Goal: Patient/Family Long Term Goal  Description: Patient's Long Term Goal: To go home    Interventions:  - BP management, Neuro checks  - See additional Care Plan goals for specific interventions  Outcome: Progressing  Goal: Patient/Family Short Term Goal  Description: Patient's Short Term Goal: be without pain    Interventions:   - Pain meds as ordered  - assess/reassess pain per protocol  - See additional Care Plan goals for specific interventions  Outcome: Progressing     Problem: METABOLIC/FLUID AND ELECTROLYTES - ADULT  Goal: Glucose maintained within prescribed range  Description: INTERVENTIONS:  - Monitor Blood Glucose as ordered  - Assess for signs and symptoms of hyperglycemia and hypoglycemia  - Administer ordered medications to maintain glucose within target range  - Assess barriers to adequate nutritional intake and initiate nutrition consult as needed  - Instruct patient on self management of diabetes  Outcome: Progressing  Goal: Electrolytes maintained within normal limits  Description: INTERVENTIONS:  - Monitor labs and rhythm and assess patient for signs and symptoms of electrolyte imbalances  - Administer electrolyte replacement as ordered  - Monitor response to electrolyte replacements, including rhythm and repeat lab results as appropriate  - Fluid restriction as ordered  - Instruct patient on fluid and nutrition restrictions as appropriate  Outcome: Progressing  Goal: Hemodynamic stability  and optimal renal function maintained  Description: INTERVENTIONS:  - Monitor labs and assess for signs and symptoms of volume excess or deficit  - Monitor intake, output and patient weight  - Monitor urine specific gravity, serum osmolarity and serum sodium as indicated or ordered  - Monitor response to interventions for patient's volume status, including labs, urine output, blood pressure (other measures as available)  - Encourage oral intake as appropriate  - Instruct patient on fluid and nutrition restrictions as appropriate  Outcome: Progressing     Problem: NEUROLOGICAL - ADULT  Goal: Achieves stable or improved neurological status  Description: INTERVENTIONS  - Assess for and report changes in neurological status  - Initiate measures to prevent increased intracranial pressure  - Maintain blood pressure and fluid volume within ordered parameters to optimize cerebral perfusion and minimize risk of hemorrhage  - Monitor temperature, glucose, and sodium. Initiate appropriate interventions as ordered  Outcome: Progressing  Goal: Absence of seizures  Description: INTERVENTIONS  - Monitor for seizure activity  - Administer anti-seizure medications as ordered  - Monitor neurological status  Outcome: Progressing  Goal: Remains free of injury related to seizure activity  Description: INTERVENTIONS:  - Maintain airway, patient safety  and administer oxygen as ordered  - Monitor patient for seizure activity, document and report duration and description of seizure to MD/LIP  - If seizure occurs, turn patient to side and suction secretions as needed  - Reorient patient post seizure  - Seizure pads on all 4 side rails  - Instruct patient/family to notify RN of any seizure activity  - Instruct patient/family to call for assistance with activity based on assessment  Outcome: Progressing  Goal: Achieves maximal functionality and self care  Description: INTERVENTIONS  - Monitor swallowing and airway patency with patient fatigue  and changes in neurological status  - Encourage and assist patient to increase activity and self care with guidance from PT/OT  - Encourage visually impaired, hearing impaired and aphasic patients to use assistive/communication devices  Outcome: Progressing     Problem: PAIN - ADULT  Goal: Verbalizes/displays adequate comfort level or patient's stated pain goal  Description: INTERVENTIONS:  - Encourage pt to monitor pain and request assistance  - Assess pain using appropriate pain scale  - Administer analgesics based on type and severity of pain and evaluate response  - Implement non-pharmacological measures as appropriate and evaluate response  - Consider cultural and social influences on pain and pain management  - Manage/alleviate anxiety  - Utilize distraction and/or relaxation techniques  - Monitor for opioid side effects  - Notify MD/LIP if interventions unsuccessful or patient reports new pain  - Anticipate increased pain with activity and pre-medicate as appropriate  Outcome: Progressing

## 2024-08-16 NOTE — PLAN OF CARE
Assumed care of patient at 1100- VSS, patient alert and oriented, reports slight tingling in tips of toes, photosensitivity, and intermittent \"wavy\" vision on waking, 5/5 strength in upper extremities, 4/5 in lower extremities. Strength in legs limited by back pain.  Q2/Q3 neuro exams, see flowsheet for detailed assessment.     Pain management regimen adjusted per MD- lidocaine patches x2 for back pain, PRN Toradol, and Percocet given.

## 2024-08-17 ENCOUNTER — APPOINTMENT (OUTPATIENT)
Dept: MRI IMAGING | Facility: HOSPITAL | Age: 39
End: 2024-08-17
Payer: MEDICAID

## 2024-08-17 ENCOUNTER — APPOINTMENT (OUTPATIENT)
Dept: ULTRASOUND IMAGING | Facility: HOSPITAL | Age: 39
End: 2024-08-17
Attending: INTERNAL MEDICINE
Payer: MEDICAID

## 2024-08-17 LAB
ANION GAP SERPL CALC-SCNC: 5 MMOL/L (ref 0–18)
BUN BLD-MCNC: 12 MG/DL (ref 9–23)
CALCIUM BLD-MCNC: 9.4 MG/DL (ref 8.7–10.4)
CHLORIDE SERPL-SCNC: 105 MMOL/L (ref 98–112)
CO2 SERPL-SCNC: 27 MMOL/L (ref 21–32)
CREAT BLD-MCNC: 0.58 MG/DL
EGFRCR SERPLBLD CKD-EPI 2021: 119 ML/MIN/1.73M2 (ref 60–?)
ERYTHROCYTE [DISTWIDTH] IN BLOOD BY AUTOMATED COUNT: 17.6 %
GLUCOSE BLD-MCNC: 119 MG/DL (ref 70–99)
GLUCOSE BLD-MCNC: 122 MG/DL (ref 70–99)
GLUCOSE BLD-MCNC: 123 MG/DL (ref 70–99)
GLUCOSE BLD-MCNC: 128 MG/DL (ref 70–99)
GLUCOSE BLD-MCNC: 146 MG/DL (ref 70–99)
HCT VFR BLD AUTO: 29.7 %
HGB BLD-MCNC: 8.8 G/DL
MCH RBC QN AUTO: 21.5 PG (ref 26–34)
MCHC RBC AUTO-ENTMCNC: 29.6 G/DL (ref 31–37)
MCV RBC AUTO: 72.6 FL
OSMOLALITY SERPL CALC.SUM OF ELEC: 285 MOSM/KG (ref 275–295)
PLATELET # BLD AUTO: 337 10(3)UL (ref 150–450)
POTASSIUM SERPL-SCNC: 4.4 MMOL/L (ref 3.5–5.1)
RBC # BLD AUTO: 4.09 X10(6)UL
SODIUM SERPL-SCNC: 137 MMOL/L (ref 136–145)
WBC # BLD AUTO: 8.3 X10(3) UL (ref 4–11)

## 2024-08-17 PROCEDURE — 99291 CRITICAL CARE FIRST HOUR: CPT | Performed by: NEUROLOGICAL SURGERY

## 2024-08-17 PROCEDURE — 70544 MR ANGIOGRAPHY HEAD W/O DYE: CPT

## 2024-08-17 PROCEDURE — 93886 INTRACRANIAL COMPLETE STUDY: CPT | Performed by: INTERNAL MEDICINE

## 2024-08-17 PROCEDURE — 99291 CRITICAL CARE FIRST HOUR: CPT | Performed by: INTERNAL MEDICINE

## 2024-08-17 RX ORDER — LORAZEPAM 2 MG/ML
INJECTION INTRAMUSCULAR
Status: COMPLETED
Start: 2024-08-17 | End: 2024-08-17

## 2024-08-17 RX ORDER — SODIUM CHLORIDE 9 MG/ML
INJECTION, SOLUTION INTRAVENOUS CONTINUOUS
Status: DISCONTINUED | OUTPATIENT
Start: 2024-08-17 | End: 2024-08-19

## 2024-08-17 RX ORDER — LORAZEPAM 2 MG/ML
1 INJECTION INTRAMUSCULAR ONCE
Status: COMPLETED | OUTPATIENT
Start: 2024-08-17 | End: 2024-08-17

## 2024-08-17 NOTE — PROGRESS NOTES
[facility]  Neurocritical Care Progress Note     Greg Uriarte Patient Status:  Inpatient    1985 MRN [id number]   Location [facility] 6NE-A Attending Cheryle Martinez MD   Hosp Day # 7 PCP Leana Gardner MD     Subjective:   Patient is a 39 year old female h/o htn, prediabetes, anxiety and depression p/w severe HA c/f sentinel sah    Hospital course significant for noted onset of severe HA and neck pain/n/v thus came to ED where w/u revealed ct/cta with ventricular enlargement compared to prior and mri with possible aneurysm thus underwent LP and admitted for further eval.     No acute events overnight.    Vitals:   Temp:  [97.2 °F (36.2 °C)-98.4 °F (36.9 °C)] 97.2 °F (36.2 °C)  Pulse:  [] 92  Resp:  [12-22] 17  BP: (110-160)/(56-96) 118/67  SpO2:  [93 %-100 %] 100 %  Body mass index is 39.09 kg/m².    Intake/Output:    Intake/Output Summary (Last 24 hours) at 2024 1015  Last data filed at 2024 0800  Gross per 24 hour   Intake 1180 ml   Output 950 ml   Net 230 ml     Current Meds:  Current Facility-Administered Medications   Medication Dose Route Frequency    oxyCODONE-acetaminophen (Percocet) 5-325 MG per tab 1.5 tablet  1.5 tablet Oral Q4H PRN    lidocaine-menthol 4-1 % patch 2 patch  2 patch Transdermal Daily    HYDROmorphone (Dilaudid) 1 MG/ML injection 0.2 mg  0.2 mg Intravenous Q2H PRN    Or    HYDROmorphone (Dilaudid) 1 MG/ML injection 0.4 mg  0.4 mg Intravenous Q2H PRN    ketorolac (Toradol) 15 MG/ML injection 15 mg  15 mg Intravenous Q6H PRN    acetaminophen (Tylenol) tab 650 mg  650 mg Oral Q6H PRN    albuterol (Ventolin HFA) 108 (90 Base) MCG/ACT inhaler 2 puff  2 puff Inhalation Q4H PRN    insulin aspart (NovoLOG) 100 Units/mL FlexPen 1-5 Units  1-5 Units Subcutaneous TID AC and HS    enoxaparin (Lovenox) 40 MG/0.4ML SUBQ injection 40 mg  40 mg Subcutaneous Daily    docusate sodium (Colace) cap 100 mg  100 mg Oral BID    polyethylene glycol (PEG 3350)  (Miralax) 17 g oral packet 17 g  17 g Oral Daily PRN    magnesium hydroxide (Milk of Magnesia) 400 MG/5ML oral suspension 30 mL  30 mL Oral Daily PRN    bisacodyl (Dulcolax) 10 MG rectal suppository 10 mg  10 mg Rectal Daily PRN    fleet enema (Fleet) 7-19 GM/118ML rectal enema 133 mL  1 enema Rectal Once PRN    labetalol (Trandate) 5 mg/mL injection 10 mg  10 mg Intravenous Q10 Min PRN    hydrALAzine (Apresoline) 20 mg/mL injection 10 mg  10 mg Intravenous Q2H PRN    metoclopramide (Reglan) 5 mg/mL injection 10 mg  10 mg Intravenous Q8H PRN    niMODipine (Nimotop) cap 60 mg  60 mg Oral 6 times per day    midazolam (Versed) 2 MG/2ML injection 2 mg  2 mg Intravenous Q15 Min PRN    ondansetron (Zofran) 4 MG/2ML injection 4 mg  4 mg Intravenous Q6H PRN    niCARdipine in sodium chloride 0.86% (carDENE) 20 mg/200mL infusion premix  5-15 mg/hr Intravenous Continuous PRN    aspirin chewable tab 81 mg  81 mg Oral Daily     Physical Examination:   General- No acute distress  CV- RRR  Resp- CTA Bilat  Neuro-  Mental status- awake and alert, regards and follows commands, oriented x3, speech fluent  Cranial nerves- pupils equally round and reactive to light, extraocular muscles intact, face symmetric, visual fields full  Motor- 5/5 throughout  Sens-  Intact to light touch    Diagnostics:   MRA BRAIN (MYK=04647)    Result Date: 8/17/2024  CONCLUSION:   1. There is susceptibility artifact in the area of previous coil embolization along the right supraclinoid internal carotid artery in the expected region of the right posterior communicating artery.  There are small areas of flow related enhancement associated with the region of the aneurysm extending into the interstices of the coil embolization pack with the largest area measuring up to 3 mm that may represent persistent patent portions of the aneurysm.  This could be further assessed with conventional cerebral angiography as clinically directed.  2. There is stable  mild-to-moderate irregularity and narrowing in the supraclinoid segment of the left internal carotid artery that may be due to underlying atherosclerosis, with changes of chronic dissection or other etiologies not entirely excluded.  Clinical correlation and continued follow-up is suggested.  3. There is mild interval decrease in caliber of the anterior cerebral arteries when compared to the previous exam suggesting at least mild vasospasm.  4. There is mild narrowing of the M1 segment of the left middle cerebral artery suggesting at least mild vasospasm.  5. There is mild narrowing in the basilar artery suggesting at least mild vasospasm.  LOCATION:  [facility]   Dictated by (CST): Jefe Braun MD on 8/17/2024 at 9:02 AM     Finalized by (CST): Jefe Braun MD on 8/17/2024 at 9:07 AM       US TRANSCRANIAL ARTERIES COMPLETE  (CPT=93886)    Result Date: 8/17/2024  CONCLUSION:  Elevated velocity in the distal right ZENOBIA distribution with mild underlying vasospasm not excluded.  Please see above for further details.   LOCATION:  [facility]   Dictated by (CST): Jefe Braun MD on 8/17/2024 at 8:17 AM     Finalized by (CST): Jefe Braun MD on 8/17/2024 at 8:18 AM      Lab Review     Recent Labs   Lab 08/15/24  0456 08/16/24  0439 08/17/24  0427    137 137   K 3.6  3.6 4.2 4.4    106 105   CO2 26.0 27.0 27.0   * 122* 123*   BUN 11 12 12   CREATSERUM 0.65 0.63 0.58     Recent Labs   Lab 08/15/24  0456 08/16/24  0439 08/17/24  0427   WBC 10.2 9.2 8.3   HGB 9.4* 8.9* 8.8*   .0 341.0 337.0     Assesment/Plan:     Neuro:  HA/n/v- in setting of ventricular enlargement and cerebral aneurysm, c/f sentinel/occult sah vs infectious process.   S/p successful coil embolization of supraclinoid R ica terminus/A1 aneurysm per PADILLA 8/10.   Cont sah protocol with neurochecks, nimotop and TCDs- mildly elevated in R zenobia, and narrowing on MRA, though exam remains intact.   Will start IVFs and cont to  trend/monitor.  PADILLA and NS on c/s.   Cardiac:  Htn- sbp goal 100-180  Pulmonary:  Stable on RA  Renal:  IVFs  GI:  PO as tolerated  Heme/ID:  Afebrile, no leukocytosis.   Endocrine/Rheum:  Monitor glucose, sliding scale insulin prn  DVT Prophylaxis:  SCD’s, lvx    Goals of the Day: neurochecks   A total of 40 minutes of critical care time (exclusive of billable procedures) was administered to manage and/or prevent neurologic instability. This involved direct patient intervention, complex decision making, and/or extensive discussions with the patient, family, and clinical staff.    Thank you for allowing me to participate in the care of this patient.     Jo Winters MD, [facility]  Medical Director of System Neurosciences  Chief, Section of Neurocritical Care  [facility]

## 2024-08-17 NOTE — PLAN OF CARE
Assumed cares for Greg about 1930, VS stable on RA throughout the night. Neuro checks q2h/q3h; light sensitivity, \"wavy vision\" when standing up(she states she has noticed an improvement), and tingling of bilateral toes noted. No double/blurry vision, pupils equal and reactive, no droop or drift. Ambulated to bathroom without difficulty. Pain managed with PRN medications, Toradol requested for back pain, PO Tylenol and percocet given for headache. Patient understanding of plan for pain management. No further concerns at this time.    Problem: Patient/Family Goals  Goal: Patient/Family Long Term Goal  Description: Patient's Long Term Goal: To go home    Interventions:  - Pain management, neuro checks, discharge education  - See additional Care Plan goals for specific interventions  Outcome: Progressing  Goal: Patient/Family Short Term Goal  Description: Patient's Short Term Goal: be without pain    Interventions:   - Pain meds as ordered  - assess/reassess pain per protocol  - See additional Care Plan goals for specific interventions  Outcome: Progressing     Problem: METABOLIC/FLUID AND ELECTROLYTES - ADULT  Goal: Glucose maintained within prescribed range  Description: INTERVENTIONS:  - Monitor Blood Glucose as ordered  - Assess for signs and symptoms of hyperglycemia and hypoglycemia  - Administer ordered medications to maintain glucose within target range  - Assess barriers to adequate nutritional intake and initiate nutrition consult as needed  - Instruct patient on self management of diabetes  Outcome: Progressing  Goal: Electrolytes maintained within normal limits  Description: INTERVENTIONS:  - Monitor labs and rhythm and assess patient for signs and symptoms of electrolyte imbalances  - Administer electrolyte replacement as ordered  - Monitor response to electrolyte replacements, including rhythm and repeat lab results as appropriate  - Fluid restriction as ordered  - Instruct patient on fluid and  nutrition restrictions as appropriate  Outcome: Progressing  Goal: Hemodynamic stability and optimal renal function maintained  Description: INTERVENTIONS:  - Monitor labs and assess for signs and symptoms of volume excess or deficit  - Monitor intake, output and patient weight  - Monitor urine specific gravity, serum osmolarity and serum sodium as indicated or ordered  - Monitor response to interventions for patient's volume status, including labs, urine output, blood pressure (other measures as available)  - Encourage oral intake as appropriate  - Instruct patient on fluid and nutrition restrictions as appropriate  Outcome: Progressing     Problem: NEUROLOGICAL - ADULT  Goal: Achieves stable or improved neurological status  Description: INTERVENTIONS  - Assess for and report changes in neurological status  - Initiate measures to prevent increased intracranial pressure  - Maintain blood pressure and fluid volume within ordered parameters to optimize cerebral perfusion and minimize risk of hemorrhage  - Monitor temperature, glucose, and sodium. Initiate appropriate interventions as ordered  Outcome: Progressing  Goal: Absence of seizures  Description: INTERVENTIONS  - Monitor for seizure activity  - Administer anti-seizure medications as ordered  - Monitor neurological status  Outcome: Progressing  Goal: Remains free of injury related to seizure activity  Description: INTERVENTIONS:  - Maintain airway, patient safety  and administer oxygen as ordered  - Monitor patient for seizure activity, document and report duration and description of seizure to MD/LIP  - If seizure occurs, turn patient to side and suction secretions as needed  - Reorient patient post seizure  - Seizure pads on all 4 side rails  - Instruct patient/family to notify RN of any seizure activity  - Instruct patient/family to call for assistance with activity based on assessment  Outcome: Progressing  Goal: Achieves maximal functionality and self  care  Description: INTERVENTIONS  - Monitor swallowing and airway patency with patient fatigue and changes in neurological status  - Encourage and assist patient to increase activity and self care with guidance from PT/OT  - Encourage visually impaired, hearing impaired and aphasic patients to use assistive/communication devices  Outcome: Progressing     Problem: PAIN - ADULT  Goal: Verbalizes/displays adequate comfort level or patient's stated pain goal  Description: INTERVENTIONS:  - Encourage pt to monitor pain and request assistance  - Assess pain using appropriate pain scale  - Administer analgesics based on type and severity of pain and evaluate response  - Implement non-pharmacological measures as appropriate and evaluate response  - Consider cultural and social influences on pain and pain management  - Manage/alleviate anxiety  - Utilize distraction and/or relaxation techniques  - Monitor for opioid side effects  - Notify MD/LIP if interventions unsuccessful or patient reports new pain  - Anticipate increased pain with activity and pre-medicate as appropriate  Outcome: Progressing     Problem: SAFETY ADULT - FALL  Goal: Free from fall injury  Description: INTERVENTIONS:  - Assess pt frequently for physical needs  - Identify cognitive and physical deficits and behaviors that affect risk of falls.  - [place] fall precautions as indicated by assessment.  - Educate pt/family on patient safety including physical limitations  - Instruct pt to call for assistance with activity based on assessment  - Modify environment to reduce risk of injury  - Provide assistive devices as appropriate  - Consider OT/PT consult to assist with strengthening/mobility  - Encourage toileting schedule  Outcome: Progressing     Problem: DISCHARGE PLANNING  Goal: Discharge to home or other facility with appropriate resources  Description: INTERVENTIONS:  - Identify barriers to discharge w/pt and caregiver  - Include  patient/family/discharge partner in discharge planning  - Arrange for needed discharge resources and transportation as appropriate  - Identify discharge learning needs (meds, wound care, etc)  - Arrange for interpreters to assist at discharge as needed  - Consider post-discharge preferences of patient/family/discharge partner  - Complete POLST form as appropriate  - Assess patient's ability to be responsible for managing their own health  - Refer to Case Management Department for coordinating discharge planning if the patient needs post-hospital services based on physician/LIP order or complex needs related to functional status, cognitive ability or social support system  Outcome: Progressing

## 2024-08-17 NOTE — PROGRESS NOTES
[facility]  Neurosurgery Progress Note    Greg Uriarte Patient Status:  Inpatient    1985 MRN [id number]   Location [facility] 6NE-A Attending Glenn Hoffman, DO   Hosp Day # 7 PCP Leana Gardner MD     Chief Complaint:  PPD #7 coil embolization of supraclinoid R ICA/A1 JARED aneurysm    Subjective:  Continues to have HA with photosensitivity and intermittent wavy vision.     Objective/Physical Exam:    Vital Signs:  Blood pressure 118/78, pulse 93, temperature 97.2 °F (36.2 °C), temperature source Temporal, resp. rate 16, weight 234 lb 14.4 oz (106.5 kg), last menstrual period 2024, SpO2 100%, not currently breastfeeding.    Respiratory:  Respirations nonlabored    CV:  NSR on tele    General: NAD, Speech Fluent, Mood Appropriate    Neurologic: This patient is alert and orientated x 3.  Able to follow commands.  Face is symmetric. PERRLA +3 brisk, EOMI.  CN 2-12 GI,  Sensation to light touch is intact bilaterally.  Finger-to-nose coordination is intact.  No Pronator Drift.  Neg Camp's.  No clonus. Strength 5/5 in all extremities.  DTRs 2+ UE and LE.        Labs:  Lab Results   Component Value Date    WBC 8.3 2024    HGB 8.8 2024    HCT 29.7 2024    .0 2024    CREATSERUM 0.58 2024    BUN 12 2024     2024    K 4.4 2024     2024    CO2 27.0 2024     2024    CA 9.4 2024    PGLU 122 2024       Imaging:  MRA BRAIN (CPT=70544)    Result Date: 2024  CONCLUSION:   1. There is susceptibility artifact in the area of previous coil embolization along the right supraclinoid internal carotid artery in the expected region of the right posterior communicating artery.  There are small areas of flow related enhancement associated with the region of the aneurysm extending into the interstices of the coil embolization pack with the largest area measuring up to 3 mm that may represent persistent patent  portions of the aneurysm.  This could be further assessed with conventional cerebral angiography as clinically directed.  2. There is stable mild-to-moderate irregularity and narrowing in the supraclinoid segment of the left internal carotid artery that may be due to underlying atherosclerosis, with changes of chronic dissection or other etiologies not entirely excluded.  Clinical correlation and continued follow-up is suggested.  3. There is mild interval decrease in caliber of the anterior cerebral arteries when compared to the previous exam suggesting at least mild vasospasm.  4. There is mild narrowing of the M1 segment of the left middle cerebral artery suggesting at least mild vasospasm.  5. There is mild narrowing in the basilar artery suggesting at least mild vasospasm.  LOCATION:  [facility]   Dictated by (CST): Jefe Braun MD on 8/17/2024 at 9:02 AM     Finalized by (CST): Jefe Braun MD on 8/17/2024 at 9:07 AM       US TRANSCRANIAL ARTERIES COMPLETE  (CPT=93886)    Result Date: 8/17/2024  CONCLUSION:  Elevated velocity in the distal right JARED distribution with mild underlying vasospasm not excluded.  Please see above for further details.   LOCATION:  [facility]   Dictated by (CST): Jefe Braun MD on 8/17/2024 at 8:17 AM     Finalized by (CST): Jefe Braun MD on 8/17/2024 at 8:18 AM         TCD Trending  Date [facility]   8/11/24 28 0.9 53 1.9   8/12/24 35 2.01 62 2.74   8/13/24 56 1.65 84 2.64   8/14/24 56 1.96 116 3.74   8/15/24 86 2.16 69 1.65   8/16/24 47 1.39 114 2.73   8/17/24 91 2.7 86 2.3             Assessment:    Greg Uriarte is a 39 year old female with PMH of HTN, DM, sickle cell trait, anemia and anxiety/depression who presented to ED with severe headache found to have 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 Alicja Easley PA-C  [facility]  8/17/2024 12:10 PM     Is this a shared or split note between Advanced Practice Provider and Physician? Yes

## 2024-08-17 NOTE — PLAN OF CARE
Assumed care at 0730. Neuro checks Q2/3hr. Pt neuro intact but reports \"wavy vision\" when getting up but subsides. Pt stable on RA. SBP goal 100-180 maintained. Pt passing gas. Tolerating PO intake. Voiding per urinal. IVF infusing. Pt encouraged to ambulate halls. Up standby assist. Pain medication given PRN for headache and back pain. Discussed plan of care with patient and patient's significant other. See doc flow sheet for vital signs and assessments.

## 2024-08-18 ENCOUNTER — APPOINTMENT (OUTPATIENT)
Dept: ULTRASOUND IMAGING | Facility: HOSPITAL | Age: 39
End: 2024-08-18
Attending: INTERNAL MEDICINE
Payer: MEDICAID

## 2024-08-18 LAB
ANION GAP SERPL CALC-SCNC: 6 MMOL/L (ref 0–18)
BUN BLD-MCNC: 10 MG/DL (ref 9–23)
CALCIUM BLD-MCNC: 9.6 MG/DL (ref 8.7–10.4)
CHLORIDE SERPL-SCNC: 107 MMOL/L (ref 98–112)
CO2 SERPL-SCNC: 23 MMOL/L (ref 21–32)
CREAT BLD-MCNC: 0.62 MG/DL
EGFRCR SERPLBLD CKD-EPI 2021: 118 ML/MIN/1.73M2 (ref 60–?)
ERYTHROCYTE [DISTWIDTH] IN BLOOD BY AUTOMATED COUNT: 18.2 %
GLUCOSE BLD-MCNC: 119 MG/DL (ref 70–99)
GLUCOSE BLD-MCNC: 129 MG/DL (ref 70–99)
GLUCOSE BLD-MCNC: 150 MG/DL (ref 70–99)
GLUCOSE BLD-MCNC: 168 MG/DL (ref 70–99)
GLUCOSE BLD-MCNC: 243 MG/DL (ref 70–99)
HCT VFR BLD AUTO: 31.7 %
HGB BLD-MCNC: 9.5 G/DL
MCH RBC QN AUTO: 21.9 PG (ref 26–34)
MCHC RBC AUTO-ENTMCNC: 30 G/DL (ref 31–37)
MCV RBC AUTO: 73 FL
OSMOLALITY SERPL CALC.SUM OF ELEC: 282 MOSM/KG (ref 275–295)
PLATELET # BLD AUTO: 380 10(3)UL (ref 150–450)
POTASSIUM SERPL-SCNC: 4.4 MMOL/L (ref 3.5–5.1)
RBC # BLD AUTO: 4.34 X10(6)UL
SODIUM SERPL-SCNC: 136 MMOL/L (ref 136–145)
WBC # BLD AUTO: 11 X10(3) UL (ref 4–11)

## 2024-08-18 PROCEDURE — 93886 INTRACRANIAL COMPLETE STUDY: CPT | Performed by: INTERNAL MEDICINE

## 2024-08-18 PROCEDURE — 99291 CRITICAL CARE FIRST HOUR: CPT | Performed by: INTERNAL MEDICINE

## 2024-08-18 PROCEDURE — 99291 CRITICAL CARE FIRST HOUR: CPT | Performed by: NEUROLOGICAL SURGERY

## 2024-08-18 RX ORDER — KETOROLAC TROMETHAMINE 15 MG/ML
15 INJECTION, SOLUTION INTRAMUSCULAR; INTRAVENOUS EVERY 6 HOURS PRN
Status: DISPENSED | OUTPATIENT
Start: 2024-08-18 | End: 2024-08-20

## 2024-08-18 NOTE — PROGRESS NOTES
[facility]   part of [facility] Hospitalist Progress Note     Greg Uriarte Patient Status:  Inpatient    1985 MRN [id number]   Location [facility] 6NE-A Attending Glenn Hoffman, DO   Hosp Day # 8 PCP Leana Gardner MD     Follow Up:  The encounter diagnosis was Acute intractable headache, unspecified headache type.    Subjective:     Patient seen and examined this morning at bedside after returning from [facility], still feeling wavy vision, headache and back pain.    Objective:    Review of Systems:   10 point ROS completed and was negative, except for pertinent positive and negatives stated in subjective.    Vital signs:  Temp:  [97.2 °F (36.2 °C)-98 °F (36.7 °C)] 98 °F (36.7 °C)  Pulse:  [] 94  Resp:  [14-22] 18  BP: (107-154)/() 125/55  SpO2:  [94 %-100 %] 97 %    Physical Exam:    General: No acute distress.   Respiratory: Clear to auscultation bilaterally. No wheezes. No rhonchi.  Cardiovascular: S1, S2. Regular rate and rhythm. No murmurs.  Abdomen: Soft, nontender, nondistended.  Positive bowel sounds. No rebound or guarding.  Extremities: No edema.  Neuro:  Grossly non focal, no motor deficits.        Diagnostic Data:    Labs:  Recent Labs   Lab 08/13/24  0444 08/14/24  0439 08/15/24  0456 08/16/24  0439 08/17/24  0427   WBC 16.2* 12.9* 10.2 9.2 8.3   HGB 9.4* 9.4* 9.4* 8.9* 8.8*   MCV 74.8* 74.5* 74.2* 72.8* 72.6*   .0 371.0 362.0 341.0 337.0       Recent Labs   Lab 08/15/24  0456 08/16/24  0439 08/17/24  042   * 122* 123*   BUN 11 12 12   CREATSERUM 0.65 0.63 0.58   CA 9.0 9.3 9.4    137 137   K 3.6  3.6 4.2 4.4    106 105   CO2 26.0 27.0 27.0     Assessment & Plan:      R ICA/JARED Aneurysm c/b Rupture  SAH s/p Coil Embolization  NSGY/Neurology on consult  Meningitis ruled out  US today: increasing velocities of L MCA w/ developing L-vasospasm  MRA: see report  Plan:  - Continue Nimodipine as directed by NSGY/Neurology  -  Neurochecks per protocol  - Continue TCD daily  - Continue ASA  - Off IVF  - Defer to NSGY regarding next steps w/ MRA findings of vasospasm and persistently increased velocities on dopplers.  - No longer on IV abx, cultures negative, appreciate ID recs    HTN  - BP parameters as per Neuro    Sciatica  - PRN pain meds, Lidocaine patch  - Continued PT/OT    Supplementary Documentation:     Quality:  DVT Prophylaxis: Lovenox  CODE status: Full    Glenn Hoffman D.O.  [place] Hospitalist

## 2024-08-18 NOTE — PROGRESS NOTES
[facility]  Neurocritical Care Progress Note     Greg Uriarte Patient Status:  Inpatient    1985 MRN [id number]   Location [facility] 6NE-A Attending Cheryle Martinez MD   Hosp Day # 8 PCP Leana Gardner MD     Subjective:   Patient is a 39 year old female h/o htn, prediabetes, anxiety and depression p/w severe HA c/f sentinel sah    Hospital course significant for noted onset of severe HA and neck pain/n/v thus came to ED where w/u revealed ct/cta with ventricular enlargement compared to prior and mri with possible aneurysm thus underwent LP and admitted for further eval.     No acute events overnight.    Vitals:   Temp:  [97.3 °F (36.3 °C)-98 °F (36.7 °C)] 97.3 °F (36.3 °C)  Pulse:  [] 92  Resp:  [14-22] 14  BP: (107-154)/() 137/78  SpO2:  [94 %-100 %] 100 %  Body mass index is 39.09 kg/m².    Intake/Output:    Intake/Output Summary (Last 24 hours) at 2024 1007  Last data filed at 2024 0800  Gross per 24 hour   Intake 2873 ml   Output 2050 ml   Net 823 ml     Current Meds:  Current Facility-Administered Medications   Medication Dose Route Frequency    sodium chloride 0.9% infusion   Intravenous Continuous    oxyCODONE-acetaminophen (Percocet) 5-325 MG per tab 1.5 tablet  1.5 tablet Oral Q4H PRN    lidocaine-menthol 4-1 % patch 2 patch  2 patch Transdermal Daily    HYDROmorphone (Dilaudid) 1 MG/ML injection 0.2 mg  0.2 mg Intravenous Q2H PRN    Or    HYDROmorphone (Dilaudid) 1 MG/ML injection 0.4 mg  0.4 mg Intravenous Q2H PRN    ketorolac (Toradol) 15 MG/ML injection 15 mg  15 mg Intravenous Q6H PRN    acetaminophen (Tylenol) tab 650 mg  650 mg Oral Q6H PRN    albuterol (Ventolin HFA) 108 (90 Base) MCG/ACT inhaler 2 puff  2 puff Inhalation Q4H PRN    insulin aspart (NovoLOG) 100 Units/mL FlexPen 1-5 Units  1-5 Units Subcutaneous TID AC and HS    enoxaparin (Lovenox) 40 MG/0.4ML SUBQ injection 40 mg  40 mg Subcutaneous Daily    docusate sodium (Colace) cap 100 mg   100 mg Oral BID    polyethylene glycol (PEG 3350) (Miralax) 17 g oral packet 17 g  17 g Oral Daily PRN    magnesium hydroxide (Milk of Magnesia) 400 MG/5ML oral suspension 30 mL  30 mL Oral Daily PRN    bisacodyl (Dulcolax) 10 MG rectal suppository 10 mg  10 mg Rectal Daily PRN    fleet enema (Fleet) 7-19 GM/118ML rectal enema 133 mL  1 enema Rectal Once PRN    labetalol (Trandate) 5 mg/mL injection 10 mg  10 mg Intravenous Q10 Min PRN    hydrALAzine (Apresoline) 20 mg/mL injection 10 mg  10 mg Intravenous Q2H PRN    metoclopramide (Reglan) 5 mg/mL injection 10 mg  10 mg Intravenous Q8H PRN    niMODipine (Nimotop) cap 60 mg  60 mg Oral 6 times per day    midazolam (Versed) 2 MG/2ML injection 2 mg  2 mg Intravenous Q15 Min PRN    ondansetron (Zofran) 4 MG/2ML injection 4 mg  4 mg Intravenous Q6H PRN    niCARdipine in sodium chloride 0.86% (carDENE) 20 mg/200mL infusion premix  5-15 mg/hr Intravenous Continuous PRN    aspirin chewable tab 81 mg  81 mg Oral Daily     Physical Examination:   General- No acute distress  CV- RRR  Resp- CTA Bilat  Neuro-  Mental status- awake and alert, regards and follows commands, oriented x3, speech fluent  Cranial nerves- pupils equally round and reactive to light, extraocular muscles intact, face symmetric, visual fields full  Motor- 5/5 throughout, finger to nose and heel to shin intact  Sens-  Intact to light touch    Diagnostics:   US TRANSCRANIAL ARTERIES COMPLETE  (CPT=93886)    Result Date: 8/18/2024  CONCLUSION:  Increasing velocities in the posterior cerebral artery distributions raises the possibility of underlying vasospasm.   Please see above for further details.  LOCATION:  [facility]   Dictated by (CST): Jefe Braun MD on 8/18/2024 at 8:26 AM     Finalized by (CST): Jefe Braun MD on 8/18/2024 at 8:28 AM      Lab Review     Recent Labs   Lab 08/16/24  0439 08/17/24  0427 08/18/24  0519    137 136   K 4.2 4.4 4.4    105 107   CO2 27.0 27.0 23.0   *  123* 119*   BUN 12 12 10   CREATSERUM 0.63 0.58 0.62     Recent Labs   Lab 08/16/24  0439 08/17/24  0427 08/18/24  0519   WBC 9.2 8.3 11.0   HGB 8.9* 8.8* 9.5*   .0 337.0 380.0     Assesment/Plan:     Neuro:  HA/n/v- in setting of ventricular enlargement and cerebral aneurysm, c/f sentinel/occult sah vs infectious process.   S/p successful coil embolization of supraclinoid R ica terminus/A1 aneurysm per PADILLA 8/10.   Cont sah protocol with neurochecks, nimotop and TCDs- mildly elevated in PCA, and narrowing on MRA, though exam remains intact. Will cont IVFs and cont to trend/monitor tcds and exam. If change in exam or significant elevation in TCDs, will consider augmented cerebral perfusion with vasopressors.   PADILLA and NS on c/s.   Cardiac:  Htn- sbp goal 120-200  Pulmonary:  Stable on RA  Renal:  IVFs for goal I/o even to positive  GI:  PO as tolerated  Heme/ID:  Afebrile, no leukocytosis.   Endocrine/Rheum:  Monitor glucose, sliding scale insulin prn  DVT Prophylaxis:  SCD’s, lvx    Goals of the Day: neurochecks   A total of 40 minutes of critical care time (exclusive of billable procedures) was administered to manage and/or prevent neurologic instability. This involved direct patient intervention, complex decision making, and/or extensive discussions with the patient, family, and clinical staff.    Thank you for allowing me to participate in the care of this patient.     Jo Winters MD, [facility]  Medical Director of System Neurosciences  Chief, Section of Neurocritical Care  [facility]

## 2024-08-18 NOTE — PROGRESS NOTES
[facility]   part of [facility] Hospitalist Progress Note     Greg Uriarte Patient Status:  Inpatient    1985 MRN [id number]   Location [facility] 6NE-A Attending Glenn Hoffman, DO   Hosp Day # 8 PCP Leana Gardner MD     Follow Up:  The encounter diagnosis was Acute intractable headache, unspecified headache type.    Subjective:     Patient seen and examined this morning at bedside.  States she feels well, however still endorses some blurry vision.      Objective:    Review of Systems:   10 point ROS completed and was negative, except for pertinent positive and negatives stated in subjective.    Vital signs:  Temp:  [97.2 °F (36.2 °C)-98 °F (36.7 °C)] 97.5 °F (36.4 °C)  Pulse:  [] 95  Resp:  [14-22] 21  BP: (121-155)/() 128/73  SpO2:  [96 %-100 %] 97 %    Physical Exam:    General: Alert, awake, cooperative.  Respiratory: Clear to auscultation bilaterally. No wheezes. No rhonchi.  Cardiovascular: S1, S2. Regular rate and rhythm. No murmurs.  Abdomen: Soft, nontender, nondistended.  Positive bowel sounds. No rebound or guarding.  Extremities: No edema.  Neuro:  Grossly non focal, no motor deficits.        Diagnostic Data:    Labs:  Recent Labs   Lab 08/14/24  0439 08/15/24  0456 08/16/24  0439 08/17/24  0427 08/18/24  0519   WBC 12.9* 10.2 9.2 8.3 11.0   HGB 9.4* 9.4* 8.9* 8.8* 9.5*   MCV 74.5* 74.2* 72.8* 72.6* 73.0*   .0 362.0 341.0 337.0 380.0       Recent Labs   Lab 24  04224  0519   * 123* 119*   BUN 12 12 10   CREATSERUM 0.63 0.58 0.62   CA 9.3 9.4 9.6    137 136   K 4.2 4.4 4.4    105 107   CO2 27.0 27.0 23.0     Assessment & Plan:      R ICA/JARED Aneurysm c/b Rupture  SAH s/p Coil Embolization  NSGY/Neurology on consult  Meningitis ruled out  US today: increasing velocities of L MCA w/ developing L-vasospasm  MRA: see report  Plan:  - Continue Nimodipine as directed by NSGY/Neurology  - Neurochecks  per protocol  - Continue TCD daily  - Continue ASA  - Cont IVF  - Defer to NSGY regarding next steps w/ MRA findings of vasospasm and persistently increased velocities on dopplers.  - No longer on IV abx, cultures negative, appreciate ID recs    HTN  - BP parameters as per Neuro    Sciatica  - PRN pain meds, Lidocaine patch  - Continued PT/OT    Supplementary Documentation:     Quality:  DVT Prophylaxis: Lovenox  CODE status: Full    Briana Stark [facility] Center[facility]

## 2024-08-18 NOTE — PLAN OF CARE
Assumed cares for Greg about 1930, VS stable on RA. Pain managed with PRN meds, see MAR. Family able to visit, informed of plan of cares. Neuro checks q2/q3; A/O x4, reports \"wavy vision\" when first waking and standing, tingling in toes present, no droop/drift, pupils equal/reactive. Greg stated a \"burning/aching\" calf pain, orders placed for US, patient kept on bed rest overnight. No further concerns at this time    Problem: Patient/Family Goals  Goal: Patient/Family Long Term Goal  Description: Patient's Long Term Goal: To go home    Interventions:  - Pain management, neuro checks, discharge education  - See additional Care Plan goals for specific interventions  Outcome: Progressing  Goal: Patient/Family Short Term Goal  Description: Patient's Short Term Goal: be without pain    Interventions:   - Pain meds as ordered  - assess/reassess pain per protocol  - See additional Care Plan goals for specific interventions  Outcome: Progressing     Problem: METABOLIC/FLUID AND ELECTROLYTES - ADULT  Goal: Glucose maintained within prescribed range  Description: INTERVENTIONS:  - Monitor Blood Glucose as ordered  - Assess for signs and symptoms of hyperglycemia and hypoglycemia  - Administer ordered medications to maintain glucose within target range  - Assess barriers to adequate nutritional intake and initiate nutrition consult as needed  - Instruct patient on self management of diabetes  Outcome: Progressing  Goal: Electrolytes maintained within normal limits  Description: INTERVENTIONS:  - Monitor labs and rhythm and assess patient for signs and symptoms of electrolyte imbalances  - Administer electrolyte replacement as ordered  - Monitor response to electrolyte replacements, including rhythm and repeat lab results as appropriate  - Fluid restriction as ordered  - Instruct patient on fluid and nutrition restrictions as appropriate  Outcome: Progressing  Goal: Hemodynamic stability and optimal renal function  maintained  Description: INTERVENTIONS:  - Monitor labs and assess for signs and symptoms of volume excess or deficit  - Monitor intake, output and patient weight  - Monitor urine specific gravity, serum osmolarity and serum sodium as indicated or ordered  - Monitor response to interventions for patient's volume status, including labs, urine output, blood pressure (other measures as available)  - Encourage oral intake as appropriate  - Instruct patient on fluid and nutrition restrictions as appropriate  Outcome: Progressing     Problem: NEUROLOGICAL - ADULT  Goal: Achieves stable or improved neurological status  Description: INTERVENTIONS  - Assess for and report changes in neurological status  - Initiate measures to prevent increased intracranial pressure  - Maintain blood pressure and fluid volume within ordered parameters to optimize cerebral perfusion and minimize risk of hemorrhage  - Monitor temperature, glucose, and sodium. Initiate appropriate interventions as ordered  Outcome: Progressing  Goal: Absence of seizures  Description: INTERVENTIONS  - Monitor for seizure activity  - Administer anti-seizure medications as ordered  - Monitor neurological status  Outcome: Progressing  Goal: Remains free of injury related to seizure activity  Description: INTERVENTIONS:  - Maintain airway, patient safety  and administer oxygen as ordered  - Monitor patient for seizure activity, document and report duration and description of seizure to MD/LIP  - If seizure occurs, turn patient to side and suction secretions as needed  - Reorient patient post seizure  - Seizure pads on all 4 side rails  - Instruct patient/family to notify RN of any seizure activity  - Instruct patient/family to call for assistance with activity based on assessment  Outcome: Progressing  Goal: Achieves maximal functionality and self care  Description: INTERVENTIONS  - Monitor swallowing and airway patency with patient fatigue and changes in  neurological status  - Encourage and assist patient to increase activity and self care with guidance from PT/OT  - Encourage visually impaired, hearing impaired and aphasic patients to use assistive/communication devices  Outcome: Progressing     Problem: PAIN - ADULT  Goal: Verbalizes/displays adequate comfort level or patient's stated pain goal  Description: INTERVENTIONS:  - Encourage pt to monitor pain and request assistance  - Assess pain using appropriate pain scale  - Administer analgesics based on type and severity of pain and evaluate response  - Implement non-pharmacological measures as appropriate and evaluate response  - Consider cultural and social influences on pain and pain management  - Manage/alleviate anxiety  - Utilize distraction and/or relaxation techniques  - Monitor for opioid side effects  - Notify MD/LIP if interventions unsuccessful or patient reports new pain  - Anticipate increased pain with activity and pre-medicate as appropriate  Outcome: Progressing     Problem: SAFETY ADULT - FALL  Goal: Free from fall injury  Description: INTERVENTIONS:  - Assess pt frequently for physical needs  - Identify cognitive and physical deficits and behaviors that affect risk of falls.  - [employer] fall precautions as indicated by assessment.  - Educate pt/family on patient safety including physical limitations  - Instruct pt to call for assistance with activity based on assessment  - Modify environment to reduce risk of injury  - Provide assistive devices as appropriate  - Consider OT/PT consult to assist with strengthening/mobility  - Encourage toileting schedule  Outcome: Progressing     Problem: DISCHARGE PLANNING  Goal: Discharge to home or other facility with appropriate resources  Description: INTERVENTIONS:  - Identify barriers to discharge w/pt and caregiver  - Include patient/family/discharge partner in discharge planning  - Arrange for needed discharge resources and transportation as  appropriate  - Identify discharge learning needs (meds, wound care, etc)  - Arrange for interpreters to assist at discharge as needed  - Consider post-discharge preferences of patient/family/discharge partner  - Complete POLST form as appropriate  - Assess patient's ability to be responsible for managing their own health  - Refer to Case Management Department for coordinating discharge planning if the patient needs post-hospital services based on physician/LIP order or complex needs related to functional status, cognitive ability or social support system  Outcome: Progressing

## 2024-08-18 NOTE — PLAN OF CARE
Assumed care at 0730. Pt neuro intact with intermittent \"wavy vision\" when getting up. Stable on RA. SBP goal 120-200 maintained. Pt has good appetite. Encouraged PO fluids. +BM today. Voiding via purewick. Pain medication given PRN for Headache and lower back pain. Bedrest until LLE US done to r/o DVT. Discussed plan of care with patient. See doc flow sheet for vital signs and assessments.

## 2024-08-18 NOTE — SLP NOTE
SPEECH DAILY NOTE - INPATIENT    ASSESSMENT & PLAN   ASSESSMENT  Pt seen for treatment to address short-term memory recall, attention and scanning as it pertains to reading.  Pt given moderate length paragraphs to read. Pt able to successfully decode 3/3 paragraphs and answer questions auditorily with 80% accuracy. Minimal errors observed with decoding, but pt was aware of errors and able to independently correct.   Short-term recall achieved with 100% accuracy in 3/3 trials with 3-4 related words.   Delayed recall also targeted and 100% accuracy achieved with a delay of 2-3 minutes in 3/3 trials given 3-4 related items.   Pt would benefit from OP services to continue to address higher cognitive level processes. Pt reviewed compensatory strategies of writing things/tasks that she needs to remember down. No other overt complaints. Will continue to follow.    Diet Recommendations - Solids: Regular  Diet Recommendations - Liquids: Thin Liquids       Aspiration Precautions: Upright position;Slow rate;Small bites and sips  Medication Administration Recommendations: One pill at a time    Patient Experiencing Pain: Yes  Pain Ratin  Pain Location: head  Pain Control: PO meds  Nursing Aware of Pain?: Yes    Treatment Plan  Treatment Plan/Recommendations: Cognitive communication therapy    Interdisciplinary Communication: Discussed with RN            GOALS  Goal #1 The patient will scan/track from left to right with initial cues with 80 % accuracy within 2 2 session(s).      In Progress   Goal #2 The patient will express short term recall strategies (W.R.A.P) after initial review/education with 100% acc     In Progress   Goal #3   The patient will utilize learned strategies within structured task to achieve 80% acc within 2/2 sessions     In Progress   Goal #4 The patient will complete divided attention task with 80% acc with min cues for strategies, within 3/3 sessions     In Progress     FOLLOW UP  Follow Up Needed  (Documentation Required): Yes  SLP Follow-up Date: 08/20/24  Number of Visits to Meet Established Goals: 3      If you have any questions, please contact LYLY Weinberg

## 2024-08-18 NOTE — PHYSICAL THERAPY NOTE
Attempted to see pt for skilled PT session, however, per RN, pt awaiting on US for LE pain to r/o DVT. Will defer session today and will follow up as medically appropriate.

## 2024-08-18 NOTE — PROGRESS NOTES
[facility]  Neurosurgery Progress Note    Greg Uriarte Patient Status:  Inpatient    1985 MRN [id number]   Location [facility] 6NE-A Attending Glenn Hoffman, DO   Hosp Day # 8 PCP Leana Gardner MD     Chief Complaint:  PPD #8 coil embolization of supraclinoid R ICA/A1 JARED aneurysm    Subjective:  Reports significant lower back pain this morning which is limiting her ability to mobilize.    Objective/Physical Exam:    Vital Signs:  Blood pressure 137/78, pulse 92, temperature 97.3 °F (36.3 °C), temperature source Temporal, resp. rate 14, weight 234 lb 14.4 oz (106.5 kg), last menstrual period 2024, SpO2 100%, not currently breastfeeding.    Respiratory:  Respirations nonlabored    CV:  NSR on tele    General: NAD, Speech Fluent, Mood Appropriate    Neurologic: This patient is alert and orientated x 3.  Able to follow commands.  Face is symmetric. PERRLA +3 brisk, EOMI.  CN 2-12 GI,  Sensation to light touch is intact bilaterally.  Finger-to-nose coordination is intact.  No Pronator Drift.  Neg Camp's.  No clonus. Strength 5/5 in all extremities.  DTRs 2+ UE and LE.        Labs:  Lab Results   Component Value Date    WBC 11.0 2024    HGB 9.5 2024    HCT 31.7 2024    .0 2024    CREATSERUM 0.62 2024    BUN 10 2024     2024    K 4.4 2024     2024    CO2 23.0 2024     2024    CA 9.6 2024    PGLU 129 2024       Imaging:  MRA BRAIN (CPT=70544)    Result Date: 2024  CONCLUSION:   1. There is susceptibility artifact in the area of previous coil embolization along the right supraclinoid internal carotid artery in the expected region of the right posterior communicating artery.  There are small areas of flow related enhancement associated with the region of the aneurysm extending into the interstices of the coil embolization pack with the largest area measuring up to 3 mm that may represent  persistent patent portions of the aneurysm.  This could be further assessed with conventional cerebral angiography as clinically directed.  2. There is stable mild-to-moderate irregularity and narrowing in the supraclinoid segment of the left internal carotid artery that may be due to underlying atherosclerosis, with changes of chronic dissection or other etiologies not entirely excluded.  Clinical correlation and continued follow-up is suggested.  3. There is mild interval decrease in caliber of the anterior cerebral arteries when compared to the previous exam suggesting at least mild vasospasm.  4. There is mild narrowing of the M1 segment of the left middle cerebral artery suggesting at least mild vasospasm.  5. There is mild narrowing in the basilar artery suggesting at least mild vasospasm.  LOCATION:  [facility]   Dictated by (CST): Jefe Braun MD on 8/17/2024 at 9:02 AM     Finalized by (CST): Jefe Braun MD on 8/17/2024 at 9:07 AM       US TRANSCRANIAL ARTERIES COMPLETE  (CPT=93886)    Result Date: 8/17/2024  CONCLUSION:  Elevated velocity in the distal right JARED distribution with mild underlying vasospasm not excluded.  Please see above for further details.   LOCATION:  [facility]   Dictated by (CST): Jefe Braun MD on 8/17/2024 at 8:17 AM     Finalized by (CST): Jefe Braun MD on 8/17/2024 at 8:18 AM         TCD Trending  Date [facility] Kimberly [facility] Kimberly   8/11/24 28 0.9 53 1.9   8/12/24 35 2.01 62 2.74   8/13/24 56 1.65 84 2.64   8/14/24 56 1.96 116 3.74   8/15/24 86 2.16 69 1.65   8/16/24 47 1.39 114 2.73   8/17/24 91 2.7 86 2.3   8/18/24 96 2.8 86 2.6             Assessment:    Greg Uriarte is a 39 year old female with PMH of HTN, DM, sickle cell trait, anemia and anxiety/depression who presented to ED with severe headache found to have SAH from ruptured aneurysm now s/p coil embolization of supraclinoid R ICA/A1 JARED aneurysm 8/10/24         Plan:  -Medical management per NCC  -Continue  induced hypertension and aggressive hydration for vasospasm  -daily TCDs   -Nimotop  -PT/OT/OOB as tolerated  -ASA81 mg daily   -DVT prophylaxis SCD, Lovenox    Patient seen with Dr. Alicja Easley PA-C  [facility]  8/18/2024 10:50 AM     Is this a shared or split note between Advanced Practice Provider and Physician? Yes

## 2024-08-19 ENCOUNTER — APPOINTMENT (OUTPATIENT)
Dept: ULTRASOUND IMAGING | Facility: HOSPITAL | Age: 39
End: 2024-08-19
Attending: NURSE PRACTITIONER
Payer: MEDICAID

## 2024-08-19 ENCOUNTER — APPOINTMENT (OUTPATIENT)
Dept: ULTRASOUND IMAGING | Facility: HOSPITAL | Age: 39
End: 2024-08-19
Attending: INTERNAL MEDICINE
Payer: MEDICAID

## 2024-08-19 LAB
ANION GAP SERPL CALC-SCNC: 7 MMOL/L (ref 0–18)
BUN BLD-MCNC: 7 MG/DL (ref 9–23)
CALCIUM BLD-MCNC: 9 MG/DL (ref 8.7–10.4)
CHLORIDE SERPL-SCNC: 109 MMOL/L (ref 98–112)
CO2 SERPL-SCNC: 21 MMOL/L (ref 21–32)
CREAT BLD-MCNC: 0.53 MG/DL
EGFRCR SERPLBLD CKD-EPI 2021: 122 ML/MIN/1.73M2 (ref 60–?)
ERYTHROCYTE [DISTWIDTH] IN BLOOD BY AUTOMATED COUNT: 18.3 %
GLUCOSE BLD-MCNC: 102 MG/DL (ref 70–99)
GLUCOSE BLD-MCNC: 119 MG/DL (ref 70–99)
GLUCOSE BLD-MCNC: 128 MG/DL (ref 70–99)
GLUCOSE BLD-MCNC: 131 MG/DL (ref 70–99)
GLUCOSE BLD-MCNC: 132 MG/DL (ref 70–99)
GLUCOSE BLD-MCNC: 199 MG/DL (ref 70–99)
GLUCOSE BLD-MCNC: 273 MG/DL (ref 70–99)
GLUCOSE BLD-MCNC: 380 MG/DL (ref 70–99)
HCT VFR BLD AUTO: 30.7 %
HGB BLD-MCNC: 8.7 G/DL
MCH RBC QN AUTO: 21.4 PG (ref 26–34)
MCHC RBC AUTO-ENTMCNC: 28.3 G/DL (ref 31–37)
MCV RBC AUTO: 75.4 FL
OSMOLALITY SERPL CALC.SUM OF ELEC: 284 MOSM/KG (ref 275–295)
PLATELET # BLD AUTO: 325 10(3)UL (ref 150–450)
POTASSIUM SERPL-SCNC: 4.2 MMOL/L (ref 3.5–5.1)
RBC # BLD AUTO: 4.07 X10(6)UL
SODIUM SERPL-SCNC: 137 MMOL/L (ref 136–145)
WBC # BLD AUTO: 10.1 X10(3) UL (ref 4–11)

## 2024-08-19 PROCEDURE — 93970 EXTREMITY STUDY: CPT | Performed by: NURSE PRACTITIONER

## 2024-08-19 PROCEDURE — 99231 SBSQ HOSP IP/OBS SF/LOW 25: CPT | Performed by: NEUROLOGICAL SURGERY

## 2024-08-19 PROCEDURE — 93886 INTRACRANIAL COMPLETE STUDY: CPT | Performed by: INTERNAL MEDICINE

## 2024-08-19 RX ORDER — CYCLOBENZAPRINE HCL 10 MG
10 TABLET ORAL 3 TIMES DAILY PRN
Status: DISCONTINUED | OUTPATIENT
Start: 2024-08-19 | End: 2024-08-23

## 2024-08-19 NOTE — CM/SW NOTE
HH referral sent via [facility]. No accepting provider at this time. Added additional 30 providers to the list. Will need to follow up with pt when list available.     16:15  Acknowledged PHQ9. Met with pt to discuss difficulty coping with diagnosis and managing at home. Per pt, she has no concerns returning home.  Pt denies any concerns managing at home. Pt stating she is up walking about and is feeling better. Encouraged pt to let RN know if she would like to talk with CM/SW.     / to remain available for support and/or discharge planning.      Gareth Mehta, MSN RN,   X [phone]

## 2024-08-19 NOTE — PROGRESS NOTES
Psych Liaison messaged RN for details on order. At this time, unknown why there was a consult placed. Patient scoring a 0 on PHQ-4 screening, no safety or psych concerns at this time.

## 2024-08-19 NOTE — PLAN OF CARE
Assumed care of patient at 1930. Pt is AOx4, following commands and CHAVEZ. Neuro exams q3hr, see neuro flowsheet  for detailed assessment Pain meds given as needed for back pain and headache. POC discussed with pt. For complete assessment see E charting.

## 2024-08-19 NOTE — PHYSICAL THERAPY NOTE
Reviewed pt's chart. Pt is waiting on BLE doppler and per EMR  is on bed rest until r/o doppler. PT will re-attempt when appropriate.

## 2024-08-19 NOTE — PHYSICAL THERAPY NOTE
Reviewed pt's chart and discuss pt with RN. Pt was seen ambulating in the hallways with staff. Met with pt and answered pt's question. Pt at this time reported that she is doing better but reports that she does not feel 100% herself yet. Pt was provided a RW for home as well as recommending HHPT to improve pt's balance and confidence when it comes to mobility at home. At this time, PT continues to encourage pt to ambulate with staff however pt does not need IP PT. PT will sign off, please re-order if needed.

## 2024-08-19 NOTE — PROGRESS NOTES
[facility]  Neurocritical Care   Progress Note     Subjective:   7/10 headache overnight, better this morning.  Ongoing back pain with bilateral toe numbness.  No issues with urination.    Vitals:   Temp:  [97.2 °F (36.2 °C)-98.8 °F (37.1 °C)] 98.8 °F (37.1 °C)  Pulse:  [] 84  Resp:  [14-23] 17  BP: (103-155)/() 137/73  SpO2:  [95 %-100 %] 99 %  Body mass index is 39.09 kg/m².    Intake/Output:  Intake/Output Summary (Last 24 hours) at 8/19/2024 0921  Last data filed at 8/19/2024 0650  Gross per 24 hour   Intake 2170 ml   Output 2000 ml   Net 170 ml     Scheduled Meds:   lidocaine-menthol  2 patch Transdermal Daily    insulin aspart  1-5 Units Subcutaneous TID AC and HS    enoxaparin  40 mg Subcutaneous Daily    docusate sodium  100 mg Oral BID    niMODipine  60 mg Oral 6 times per day    aspirin  81 mg Oral Daily     Continuous Infusions:   niCARdipine Stopped (08/11/24 0000)     PRN Meds:  ketorolac    oxyCODONE-acetaminophen    HYDROmorphone **OR** HYDROmorphone    acetaminophen    albuterol    polyethylene glycol (PEG 3350)    magnesium hydroxide    bisacodyl    fleet enema    labetalol    hydrALAzine    [DISCONTINUED] ondansetron **OR** metoclopramide    midazolam    ondansetron    niCARdipine    Physical Examination:   General- No acute distress  CV- RRR  Resp-no increased work of breath  Neuro-  Mental status- awake and alert, regards and follows commands, oriented x3, speech fluent  Cranial nerves- pupils equally round and reactive to light, extraocular muscles intact, face symmetric, visual fields full, hearing grossly intact, tongue midline  Motor- 5/5 throughout except 4/5 bilateral knee extension secondary to pain  Sens-  Intact to light touch  Cerebellar -finger-to-nose intact bilaterally    Diagnostics:   US TRANSCRANIAL ARTERIES COMPLETE  (CPT=93886)    Result Date: 8/19/2024  CONCLUSION:  Bilateral MCA/ICA ratios are within the normal range and similar to prior  imaging.  No evidence of intracranial arterial vasospasm.   LOCATION:  [facility]   Dictated by (CST): Dony Reece DO on 8/19/2024 at 7:32 AM     Finalized by (CST): Dony Reece DO on 8/19/2024 at 7:33 AM      Lab Review     Recent Labs   Lab 08/17/24 0427 08/18/24 0519 08/19/24 0420    136 137   K 4.4 4.4 4.2    107 109   CO2 27.0 23.0 21.0   * 119* 128*   BUN 12 10 7*   CREATSERUM 0.58 0.62 0.53*     Recent Labs   Lab 08/17/24 0427 08/18/24 0519 08/19/24 0420   WBC 8.3 11.0 10.1   HGB 8.8* 9.5* 8.7*   .0 380.0 325.0     Assesment/Plan:   39 year old female h/o htn, prediabetes, anxiety and depression p/w severe HA c/f sentinel sah 8/9     Neuro:  HA/n/v- in setting of ventricular enlargement and cerebral aneurysm, c/f sentinel/occult sah  Occult subarachnoid hemorrhage PBD 10  LP with bloody tap, nondiagnostic, however in setting of spasms and aneurysm, treating like ruptured SAH  S/p successful coil embolization of supraclinoid R ica terminus/A1 aneurysm per PADILLA 8/10.   Cont sah protocol with neurochecks, nimotop and TCDs  Asa duration per PADILLA  PT/OT evals   Cardiac:  Htn- sbp goal 100-200  Pulmonary: - Stable on RA  Renal:  Creatinine within normal limits, strict ins and outs to monitor with goal being normovolemia   GI:  PO as tolerated  Heme/ID:  Afebrile, no leukocytosis.   Anemia - Hemoglobin goal greater than 7, continue to monitor  Endocrine/Rheum:  Monitor glucose, sliding scale insulin prn  Checklist   - Lines: PIVs   - DVT Prophylaxis: SCDs and Lovenox   - Diet: Gen diet    - IVF:   - Electrolytes: Replete per protocol   - Code Status: FULL    Dispo: CNICU    A total of 40 minutes of critical care time (exclusive of billable procedures) was administered to manage and/or prevent neurologic instability. This involved direct patient intervention, complex decision making, and/or extensive discussions with the patient, family, and clinical staff.    Alexandra Damon,  MD  Neurocritical Care  [facility]

## 2024-08-19 NOTE — PROGRESS NOTES
[facility]  Neurosurgery Progress Note    Greg Uriarte Patient Status:  Inpatient    1985 MRN [id number]   Location [facility] 6NE-A Attending Glenn Hoffman, DO   Hosp Day # 9 PCP Leana Gardner MD     Chief Complaint:  PPD #9 coil embolization of supraclinoid R ICA/A1 JARED aneurysm    Subjective:  Patient reports doing significantly better this morning. Her pain is better controlled with decreased back pain. She feels very good this morning. TCDs trending downwards this morning.    Objective/Physical Exam:    Vital Signs:  Blood pressure 137/73, pulse 84, temperature 98.8 °F (37.1 °C), temperature source Temporal, resp. rate 17, weight 234 lb 14.4 oz (106.5 kg), last menstrual period 2024, SpO2 99%, not currently breastfeeding.    Respiratory:  Respirations nonlabored    CV:  NSR on tele    General: NAD, Speech Fluent, Mood Appropriate    Neurologic: This patient is alert and orientated x 3.  Able to follow commands.  Face is symmetric. PERRLA +3 brisk, EOMI.  CN 2-12 GI,  Sensation to light touch is intact bilaterally.  Finger-to-nose coordination is intact.  No Pronator Drift.  Neg Camp's.  No clonus. Strength 5/5 in all extremities.  DTRs 2+ UE and LE.        Labs:  Lab Results   Component Value Date    WBC 10.1 2024    HGB 8.7 2024    HCT 30.7 2024    .0 2024    CREATSERUM 0.53 2024    BUN 7 2024     2024    K 4.2 2024     2024    CO2 21.0 2024     2024    CA 9.0 2024    PGLU 131 2024       Imaging:  MRA BRAIN (CPT=70544)    Result Date: 2024  CONCLUSION:   1. There is susceptibility artifact in the area of previous coil embolization along the right supraclinoid internal carotid artery in the expected region of the right posterior communicating artery.  There are small areas of flow related enhancement associated with the region of the aneurysm extending into the interstices of the  coil embolization pack with the largest area measuring up to 3 mm that may represent persistent patent portions of the aneurysm.  This could be further assessed with conventional cerebral angiography as clinically directed.  2. There is stable mild-to-moderate irregularity and narrowing in the supraclinoid segment of the left internal carotid artery that may be due to underlying atherosclerosis, with changes of chronic dissection or other etiologies not entirely excluded.  Clinical correlation and continued follow-up is suggested.  3. There is mild interval decrease in caliber of the anterior cerebral arteries when compared to the previous exam suggesting at least mild vasospasm.  4. There is mild narrowing of the M1 segment of the left middle cerebral artery suggesting at least mild vasospasm.  5. There is mild narrowing in the basilar artery suggesting at least mild vasospasm.  LOCATION:  [facility]   Dictated by (CST): Jefe Braun MD on 8/17/2024 at 9:02 AM     Finalized by (CST): Jefe Braun MD on 8/17/2024 at 9:07 AM       US TRANSCRANIAL ARTERIES COMPLETE  (CPT=93886)    Result Date: 8/17/2024  CONCLUSION:  Elevated velocity in the distal right JARED distribution with mild underlying vasospasm not excluded.  Please see above for further details.   LOCATION:  [facility]   Dictated by (CST): Jefe Braun MD on 8/17/2024 at 8:17 AM     Finalized by (CST): Jefe Braun MD on 8/17/2024 at 8:18 AM         TCD Trending  Date [facility]   8/11/24 28 0.9 53 1.9   8/12/24 35 2.01 62 2.74   8/13/24 56 1.65 84 2.64   8/14/24 56 1.96 116 3.74   8/15/24 86 2.16 69 1.65   8/16/24 47 1.39 114 2.73   8/17/24 91 2.7 86 2.3   8/18/24 96 2.8 86 2.6             Assessment:    Greg Uriarte is a 39 year old female with PMH of HTN, DM, sickle cell trait, anemia and anxiety/depression who presented to ED with severe headache found to have SAH from ruptured aneurysm now s/p coil embolization of supraclinoid  R ICA/A1 JARED aneurysm 8/10/24         Plan:  -Medical management per NCC  -daily TCDs   -Nimotop  -PT/OT/OOB as tolerated  -ASA81 mg daily   -DVT prophylaxis SCD, Lovenox  -Neurosurgery to sign off. Will defer to PADILLA for further care and follow up     Patient seen with Dr. Lindsey Easley PA-C  [facility]  8/19/2024 10:46 AM     Is this a shared or split note between Advanced Practice Provider and Physician? Yes    Patient seen and examined with PA  Case discussed  She is doing well  Feels better today  Will sign off  Spoke with neurocritical care  Please call with any questions or concerns

## 2024-08-19 NOTE — OCCUPATIONAL THERAPY NOTE
OCCUPATIONAL THERAPY TREATMENT NOTE - INPATIENT     Room Number: 6616/6616-A  Session: 2   Number of Visits to Meet Established Goals: 8    Presenting Problem: R PCA aneurysm s/p coiling 8/10      ASSESSMENT   Patient demonstrates good  progress this session, goal at supervision and Modified independent level. Occupational Therapy will discharge patient at this time as all goals have been met at see above.    Patient would benefit from home OT at discharge.          History: Patient is a 39 year old female admitted on 8/9/2024 with Presenting Problem: R PCA aneurysm s/p coiling 8/10. Co-Morbidities : HTN, HA, anxiety, depression     WEIGHT BEARING RESTRICTION  Weight Bearing Restriction: None                Recommendations for nursing staff:   Transfers: supervision   Toileting location: toilet    TREATMENT SESSION:  Patient Start of Session: seated  FUNCTIONAL TRANSFER ASSESSMENT  Sit to Stand: Chair  Edge of Bed: Not Tested  Chair: Independent       EDUCATION PROVIDED  Patient: Role of Occupational Therapy; Plan of Care; Posture/Positioning; Energy Conservation; Adaptive Equipment Recommendations  Patient's Response to Education: Verbalized Understanding  Family/Caregiver: -- (same)  Family/Caregiver's Response to Education: Verbalized Understanding      Equipment used: none    Therapist comments: pt was ambulating hallway with RN, no device.  After pt returned to the room, OT educated the pt about LB dressing sequencing, body mechanics,possible use of reacher and sock aid (pending back/leg pain), and energy conservation principles. Issued adaptive equipment handout. Pt verbalized understanding.     Patient End of Session: Needs met;Call light within reach;In bed;RN aware of session/findings;All patient questions and concerns addressed    PAIN ASSESSMENT  Rating: Unable to rate  Location: \"just get uncomfortable with my back and legs\"  Management Techniques: Body mechanics     OBJECTIVE  Precautions: Other  (Comment) (-180)    AM-PAC ‘6-Clicks’ Inpatient Daily Activity Short Form  -   Putting on and taking off regular lower body clothing?: A Little  -   Bathing (including washing, rinsing, drying)?: A Little  -   Toileting, which includes using toilet, bedpan or urinal? : None  -   Putting on and taking off regular upper body clothing?: None  -   Taking care of personal grooming such as brushing teeth?: None  -   Eating meals?: None    AM-PAC Score:  Score: 22  Approx Degree of Impairment: 25.8%  Standardized Score (AM-PAC Scale): 47.1    PLAN  OT Treatment Plan: Balance activities;ADL training;Functional transfer training;Visual perceptual training;UE strengthening/ROM;Endurance training;Cognitive reorientation;Patient/Family education;Patient/Family training;Equipment eval/education;Compensatory technique education;Fine motor coordination activities;Neuromuscluar reeducation  Rehab Potential : Good  Frequency: 3-5x/week    OT Goals:   ADL Goals   Patient will perform grooming: with supervision  Patient will perform lower body dressing:  with min assist  Patient will perform toileting: with min assist     Functional Transfer Goals  Patient will transfer from sit to stand:  with min assist  Patient will transfer to toilet:  with min assist     UE Exercise Program Goal  Patient will be supervision with left AROM HEP (home exercise program).     Additional Goals  Pt will stand 2 minutes CGA for ADL task     Therapist Goals  Complete visual perceptual screening, as tolerated  Complete PhQ9       OT Session Time: 14 minutes  Self-Care Home Management: 8 minutes  Therapeutic Activity: 3 minutes

## 2024-08-19 NOTE — SPIRITUAL CARE NOTE
Spiritual Care Visit Note    Patient Name: Greg Uriarte Date of Spiritual Care Visit: 24   : 1985 Primary Dx: Acute intractable headache, unspecified headache type       Referred By:      Spiritual Care Taxonomy:    Intended Effects: Establish rapport and connectedness    Methods: Collaborate with care team member    Interventions: [place];Acknowledge current situation;Active listening;Ask guided questions    Visit Type/Summary:  Offered a supportive, listening presence.  Greg acknowledged that she is looking forward to seeing what God has planned in her life, especially since he got her thru the recent surgery.     - PoA: Other: Provided education regarding PoA for Healthcare. Left PoA information with patient for review.   remains available for follow up.    Spiritual Care support can be requested via an [facility] consult. For urgent/immediate needs, please contact the On Call  at: [facility]: ext [phone]

## 2024-08-20 ENCOUNTER — APPOINTMENT (OUTPATIENT)
Dept: ULTRASOUND IMAGING | Facility: HOSPITAL | Age: 39
End: 2024-08-20
Attending: INTERNAL MEDICINE
Payer: MEDICAID

## 2024-08-20 PROBLEM — M54.41 ACUTE BILATERAL LOW BACK PAIN WITH BILATERAL SCIATICA: Status: ACTIVE | Noted: 2024-08-20

## 2024-08-20 PROBLEM — M54.42 ACUTE BILATERAL LOW BACK PAIN WITH BILATERAL SCIATICA: Status: ACTIVE | Noted: 2024-08-20

## 2024-08-20 LAB
ANION GAP SERPL CALC-SCNC: 7 MMOL/L (ref 0–18)
BUN BLD-MCNC: 9 MG/DL (ref 9–23)
CALCIUM BLD-MCNC: 9.4 MG/DL (ref 8.7–10.4)
CHLORIDE SERPL-SCNC: 106 MMOL/L (ref 98–112)
CO2 SERPL-SCNC: 23 MMOL/L (ref 21–32)
CREAT BLD-MCNC: 0.61 MG/DL
EGFRCR SERPLBLD CKD-EPI 2021: 118 ML/MIN/1.73M2 (ref 60–?)
ERYTHROCYTE [DISTWIDTH] IN BLOOD BY AUTOMATED COUNT: 18.3 %
GLUCOSE BLD-MCNC: 109 MG/DL (ref 70–99)
GLUCOSE BLD-MCNC: 119 MG/DL (ref 70–99)
GLUCOSE BLD-MCNC: 153 MG/DL (ref 70–99)
GLUCOSE BLD-MCNC: 185 MG/DL (ref 70–99)
GLUCOSE BLD-MCNC: 238 MG/DL (ref 70–99)
HCT VFR BLD AUTO: 29.6 %
HGB BLD-MCNC: 8.9 G/DL
MAGNESIUM SERPL-MCNC: 1.7 MG/DL (ref 1.6–2.6)
MCH RBC QN AUTO: 22.2 PG (ref 26–34)
MCHC RBC AUTO-ENTMCNC: 30.1 G/DL (ref 31–37)
MCV RBC AUTO: 73.8 FL
OSMOLALITY SERPL CALC.SUM OF ELEC: 282 MOSM/KG (ref 275–295)
PLATELET # BLD AUTO: 330 10(3)UL (ref 150–450)
POTASSIUM SERPL-SCNC: 4 MMOL/L (ref 3.5–5.1)
RBC # BLD AUTO: 4.01 X10(6)UL
SODIUM SERPL-SCNC: 136 MMOL/L (ref 136–145)
WBC # BLD AUTO: 9.9 X10(3) UL (ref 4–11)

## 2024-08-20 PROCEDURE — 99291 CRITICAL CARE FIRST HOUR: CPT | Performed by: OTHER

## 2024-08-20 PROCEDURE — 93886 INTRACRANIAL COMPLETE STUDY: CPT | Performed by: INTERNAL MEDICINE

## 2024-08-20 RX ORDER — IBUPROFEN 400 MG/1
400 TABLET, FILM COATED ORAL EVERY 6 HOURS PRN
Status: DISCONTINUED | OUTPATIENT
Start: 2024-08-20 | End: 2024-08-23

## 2024-08-20 RX ORDER — MAGNESIUM OXIDE 400 MG/1
400 TABLET ORAL ONCE
Status: COMPLETED | OUTPATIENT
Start: 2024-08-20 | End: 2024-08-20

## 2024-08-20 RX ORDER — DEXAMETHASONE SODIUM PHOSPHATE 4 MG/ML
4 VIAL (ML) INJECTION EVERY 6 HOURS
Status: COMPLETED | OUTPATIENT
Start: 2024-08-20 | End: 2024-08-21

## 2024-08-20 RX ORDER — GABAPENTIN 300 MG/1
300 CAPSULE ORAL 3 TIMES DAILY
Status: DISCONTINUED | OUTPATIENT
Start: 2024-08-20 | End: 2024-08-21

## 2024-08-20 NOTE — HOME CARE LIAISON
[facility] Spoke w/ patient who is agreeable with [facility]. Contact information placed on AVS.

## 2024-08-20 NOTE — DISCHARGE INSTRUCTIONS
[facility] @ discharge for Physical Therapy  [phone]    B.E. F.A.S.T.  B is for balance. Sudden loss of balance or coordination.  E is for eyes. Vision changes in one or both eyes.  F is for face drooping.  Drooping or numbness on one side of the face. This may be more noticeable when you ask the affected person to smile.  A is for arm weakness or numbness.  The affected person may have trouble using or lifting one side.  S is for speech difficulty. Speech may be slurred or hard to understand. The affected person may also use the wrong words.  T is for time to call 911. Time is critical in treating a stroke. Call 911 as soon as you suspect a stroke has happened--even a small one. The sooner treatment is started the better, even if the symptoms go away.  Other common symptoms of a stroke include:  Having trouble getting the right words to come out  Weakness in one leg  Numbness on one side  Trouble walking  Trouble with coordination  Trouble with vision  Severe headache  Confusion  Dizziness

## 2024-08-20 NOTE — CM/SW NOTE
Per [facility], no [facility] agencies able to accept patient--additional referrals sent in [facility]--pending    [facility]

## 2024-08-20 NOTE — PROGRESS NOTES
[facility]   part of [facility] Hospitalist Progress Note     Greg Uriarte Patient Status:  Inpatient    1985 MRN [id number]   Location [facility] 6NE-A Attending Glenn Hoffman, DO   Hosp Day # 10 PCP Leana Gardner MD     Follow Up:  The encounter diagnosis was Acute intractable headache, unspecified headache type.    Subjective:     Patient seen and examined this morning at bedside.  States her blurry vision has improved, only occurred in the morning upon wakening.    Objective:    Review of Systems:   10 point ROS completed and was negative, except for pertinent positive and negatives stated in subjective.    Vital signs:  Temp:  [97.8 °F (36.6 °C)-98.8 °F (37.1 °C)] 98.8 °F (37.1 °C)  Pulse:  [] 88  Resp:  [13-25] 24  BP: (117-165)/(62-97) 147/87  SpO2:  [95 %-100 %] 100 %    Physical Exam:    General: Alert, awake, cooperative.  Respiratory: Clear to auscultation bilaterally. No wheezes. No rhonchi.  Cardiovascular: S1, S2. Regular rate and rhythm. No murmurs.  Abdomen: Soft, nontender, nondistended.  Positive bowel sounds. No rebound or guarding.  Extremities: No edema.  No cyanosis.  Neuro:  Grossly non focal, no motor deficits.        Diagnostic Data:    Labs:  Recent Labs   Lab 08/15/24  0456 24  0439 24  0427 24  0519 24  0420   WBC 10.2 9.2 8.3 11.0 10.1   HGB 9.4* 8.9* 8.8* 9.5* 8.7*   MCV 74.2* 72.8* 72.6* 73.0* 75.4*   .0 341.0 337.0 380.0 325.0       Recent Labs   Lab 24  04224  0519 24  0420   * 119* 128*   BUN 12 10 7*   CREATSERUM 0.58 0.62 0.53*   CA 9.4 9.6 9.0    136 137   K 4.4 4.4 4.2    107 109   CO2 27.0 23.0 21.0     Assessment & Plan:    39 year old female with PMH sig for htn, headaches (migraines, takes ibuprofen) who presented with severe HA      R ICA/JARED Aneurysm c/b Rupture  SAH s/p Coil Embolization  NSGY/Neurology on consult  US: increasing velocities of L MCA w/  developing L-vasospasm  MRA: see report  Plan:  - Continue Nimodipine as directed by NSGY/Neurology  - Neurochecks per protocol  - Continue TCD daily  - Continue ASA  - Cont IVF  - Defer to NSGY regarding next steps w/ MRA findings of vasospasm and persistently increased velocities on dopplers.    HTN  - BP parameters as per Neuro    Sciatica  - PRN pain meds, Lidocaine patch  - Continued PT/OT    Supplementary Documentation:     Quality:  DVT Prophylaxis: Lovenox  CODE status: Full    Briana Winters DO  [facility] Hospitalist

## 2024-08-20 NOTE — PROGRESS NOTES
[facility]  Neurocritical Care   Progress Note     Subjective:   TCDs within normal limits, sodium actually within goal, relatively euvolemic. Still with ongoing back pain more so than head pain. Radiating up and down her back, today more up to her torso. Tingling in toes stable. No urinary or stool incontinence.     Vitals:   Temp:  [97.2 °F (36.2 °C)-98.8 °F (37.1 °C)] 97.2 °F (36.2 °C)  Pulse:  [] 75  Resp:  [13-25] 16  BP: ()/(60-97) 122/78  SpO2:  [94 %-100 %] 98 %  Body mass index is 39.09 kg/m².    Intake/Output:  Intake/Output Summary (Last 24 hours) at 8/20/2024 0850  Last data filed at 8/20/2024 0800  Gross per 24 hour   Intake 1891 ml   Output 2405 ml   Net -514 ml     Scheduled Meds:   lidocaine-menthol  2 patch Transdermal Daily    insulin aspart  1-5 Units Subcutaneous TID AC and HS    enoxaparin  40 mg Subcutaneous Daily    docusate sodium  100 mg Oral BID    niMODipine  60 mg Oral 6 times per day    aspirin  81 mg Oral Daily     Continuous Infusions:   niCARdipine Stopped (08/11/24 0000)     PRN Meds:  cyclobenzaprine    ketorolac    oxyCODONE-acetaminophen    HYDROmorphone **OR** HYDROmorphone    acetaminophen    albuterol    polyethylene glycol (PEG 3350)    magnesium hydroxide    bisacodyl    fleet enema    labetalol    hydrALAzine    [DISCONTINUED] ondansetron **OR** metoclopramide    midazolam    ondansetron    niCARdipine    Physical Examination:   General- No acute distress  CV- RRR  Resp-no increased work of breath  Neuro-  Mental status- awake and alert, regards and follows commands, oriented x3, speech fluent  Cranial nerves- pupils equally round and reactive to light, extraocular muscles intact, face symmetric, visual fields full, hearing grossly intact, tongue midline  Motor- 5/5 throughout except including BL KE/F, DF, PF, HF BL 4/5 limited to pain.   Sens-  Intact to light touch  Cerebellar -finger-to-nose intact bilaterally    Diagnostics:   US  TRANSCRANIAL ARTERIES COMPLETE  (CPT=93886)    Result Date: 8/20/2024  CONCLUSION:   No evidence of intracranial arterial vasospasm.   LOCATION:  [facility]   Dictated by (CST): Den Chris MD on 8/20/2024 at 7:54 AM     Finalized by (CST): Den Chris MD on 8/20/2024 at 7:57 AM       US VENOUS DOPPLER LEG BILAT - DIAG IMG (CPT=93970)    Result Date: 8/19/2024  CONCLUSION:  Negative exam, no evidence of lower extremity DVT.   LOCATION:  [facility]   Dictated by (CST): Dony Reece DO on 8/19/2024 at 1:30 PM     Finalized by (CST): Dony Reece DO on 8/19/2024 at 1:31 PM      Lab Review     Recent Labs   Lab 08/18/24  0519 08/19/24  0420 08/20/24  0444    137 136   K 4.4 4.2 4.0    109 106   CO2 23.0 21.0 23.0   * 128* 119*   BUN 10 7* 9   CREATSERUM 0.62 0.53* 0.61     Recent Labs   Lab 08/18/24  0519 08/19/24  0420 08/20/24  0444   WBC 11.0 10.1 9.9   HGB 9.5* 8.7* 8.9*   .0 325.0 330.0     Assesment/Plan:   39 year old female h/o htn, prediabetes, anxiety and depression p/w severe HA c/f sentinel sah 8/9     Neuro:  Occult subarachnoid hemorrhage - PBD 11  HA/N/V in setting of ventricular enlargement and cerebral aneurysm, c/f sentinel/occult sah  LP with bloody tap, nondiagnostic, however in setting of spasms and aneurysm, treating like ruptured SAH  S/p successful coil embolization of supraclinoid R ica terminus/A1 aneurysm per PADILLA 8/10.   Cont sah protocol with neurochecks, nimotop x21 days and TCDs daily  Asa duration per PADILLA  PT/OT evals   Headaches and backpain - likely in setting of above, requiring multiple prns  - Start gabapentin 300 TID and decadron 4q6 x48 hours.   - Continue flexeril 10mg TID prn   Cardiac:  HTN - sbp goal 100-200  Pulmonary: - Stable on RA  Renal:  Creatinine within normal limits, strict ins and outs to monitor with goal being normovolemia   Hypomagnesemia - Mag being replaced this AM     GI:  PO as tolerated  Heme/ID:  Afebrile, no leukocytosis.   Anemia -  Hemoglobin goal greater than 7, continue to monitor  Endocrine/Rheum:  Prediabetic - Monitor glucose, sliding scale insulin prn  Checklist   - Lines: PIVs   - DVT Prophylaxis: SCDs and Lovenox   - Diet: Gen diet    - IVF:   - Electrolytes: Replete per protocol   - Code Status: FULL    Dispo: CNICU    A total of 45 minutes of critical care time (exclusive of billable procedures) was administered to manage and/or prevent neurologic instability. This involved direct patient intervention, complex decision making, and/or extensive discussions with the patient, family, and clinical staff.    Alexandra Damon MD  Neurocritical Care  [facility]

## 2024-08-20 NOTE — PROGRESS NOTES
[facility]   part of [facility] Hospitalist Progress Note     Greg Uriarte Patient Status:  Inpatient    1985 MRN [id number]   Location [facility] 6NE-A Attending Glenn Hoffman, DO   Hosp Day # 10 PCP Leana Gardner MD     Follow Up:  The encounter diagnosis was Acute intractable headache, unspecified headache type.    Subjective:     Patient seen and examined this morning at bedside.  Endorses headaches, some improvement in back pain.     Objective:    Review of Systems:   10 point ROS completed and was negative, except for pertinent positive and negatives stated in subjective.    Vital signs:  Temp:  [97.2 °F (36.2 °C)-98.8 °F (37.1 °C)] 97.8 °F (36.6 °C)  Pulse:  [] 109  Resp:  [13-35] 35  BP: ()/(54-97) 140/88  SpO2:  [91 %-100 %] 98 %    Physical Exam:    General: Alert, awake, cooperative.  Respiratory: Clear to auscultation bilaterally. No wheezes. No rhonchi.  Cardiovascular: S1, S2. Regular rate and rhythm. No murmurs.  Abdomen: Soft, nontender, nondistended.  Positive bowel sounds. No rebound or guarding.  Extremities: No edema.  No cyanosis.  Neuro:  Grossly non focal, no motor deficits.        Diagnostic Data:    Labs:  Recent Labs   Lab 24  0439 24  0427 24  0524  0420 24  0444   WBC 9.2 8.3 11.0 10.1 9.9   HGB 8.9* 8.8* 9.5* 8.7* 8.9*   MCV 72.8* 72.6* 73.0* 75.4* 73.8*   .0 337.0 380.0 325.0 330.0       Recent Labs   Lab 24  0524  0420 24  0444   * 128* 119*   BUN 10 7* 9   CREATSERUM 0.62 0.53* 0.61   CA 9.6 9.0 9.4    137 136   K 4.4 4.2 4.0    109 106   CO2 23.0 21.0 23.0     Assessment & Plan:    39 year old female with PMH sig for htn, headaches (migraines, takes ibuprofen) who presented with severe HA      R ICA/JARED Aneurysm c/b Rupture  SAH s/p Coil Embolization  NSGY/Neurology on consult  US: increasing velocities of L MCA w/ developing L-vasospasm  MRA: see  report  Plan:  - Continue Nimodipine as directed by NSGY/Neurology  - Neurochecks per protocol  - Continue TCD daily  - Continue ASA  - Defer to NSGY regarding next steps w/ MRA findings of vasospasm and persistently increased velocities on dopplers.    HTN  - BP parameters as per Neuro    Sciatica  - PRN pain meds, Lidocaine patch  - Continued PT/OT    Supplementary Documentation:     Quality:  DVT Prophylaxis: Lovenox  CODE status: Full    Briana Winters DO  [facility]

## 2024-08-20 NOTE — PLAN OF CARE
Assumed care of patient at 1930. Pt is AOx4, following commands, and CHAVEZ. Neuro exam q3hr at night, see neuro flowsheet for detailed assessment. Lower back pain managed with as needed pain meds. Boaz Herrera APN updated on pt's pain status. POC discussed with patient. For complete assessment see E charting.

## 2024-08-21 ENCOUNTER — APPOINTMENT (OUTPATIENT)
Dept: ULTRASOUND IMAGING | Facility: HOSPITAL | Age: 39
End: 2024-08-21
Attending: INTERNAL MEDICINE
Payer: MEDICAID

## 2024-08-21 LAB
ANION GAP SERPL CALC-SCNC: 7 MMOL/L (ref 0–18)
BUN BLD-MCNC: 8 MG/DL (ref 9–23)
CALCIUM BLD-MCNC: 10 MG/DL (ref 8.7–10.4)
CHLORIDE SERPL-SCNC: 107 MMOL/L (ref 98–112)
CO2 SERPL-SCNC: 22 MMOL/L (ref 21–32)
CREAT BLD-MCNC: 0.58 MG/DL
EGFRCR SERPLBLD CKD-EPI 2021: 119 ML/MIN/1.73M2 (ref 60–?)
ERYTHROCYTE [DISTWIDTH] IN BLOOD BY AUTOMATED COUNT: 18.9 %
GLUCOSE BLD-MCNC: 203 MG/DL (ref 70–99)
GLUCOSE BLD-MCNC: 207 MG/DL (ref 70–99)
GLUCOSE BLD-MCNC: 223 MG/DL (ref 70–99)
GLUCOSE BLD-MCNC: 257 MG/DL (ref 70–99)
GLUCOSE BLD-MCNC: 259 MG/DL (ref 70–99)
HCT VFR BLD AUTO: 34.1 %
HGB BLD-MCNC: 9.5 G/DL
MAGNESIUM SERPL-MCNC: 1.8 MG/DL (ref 1.6–2.6)
MCH RBC QN AUTO: 21.8 PG (ref 26–34)
MCHC RBC AUTO-ENTMCNC: 27.9 G/DL (ref 31–37)
MCV RBC AUTO: 78.4 FL
OSMOLALITY SERPL CALC.SUM OF ELEC: 286 MOSM/KG (ref 275–295)
PLATELET # BLD AUTO: 400 10(3)UL (ref 150–450)
POTASSIUM SERPL-SCNC: 4.4 MMOL/L (ref 3.5–5.1)
RBC # BLD AUTO: 4.35 X10(6)UL
SODIUM SERPL-SCNC: 136 MMOL/L (ref 136–145)
WBC # BLD AUTO: 17.2 X10(3) UL (ref 4–11)

## 2024-08-21 PROCEDURE — 99291 CRITICAL CARE FIRST HOUR: CPT | Performed by: OTHER

## 2024-08-21 PROCEDURE — 93886 INTRACRANIAL COMPLETE STUDY: CPT | Performed by: INTERNAL MEDICINE

## 2024-08-21 RX ORDER — GABAPENTIN 300 MG/1
300 CAPSULE ORAL ONCE
Status: COMPLETED | OUTPATIENT
Start: 2024-08-21 | End: 2024-08-21

## 2024-08-21 RX ORDER — OXYCODONE AND ACETAMINOPHEN 5; 325 MG/1; MG/1
1.5 TABLET ORAL EVERY 6 HOURS PRN
Status: DISCONTINUED | OUTPATIENT
Start: 2024-08-21 | End: 2024-08-23

## 2024-08-21 RX ORDER — HYDROMORPHONE HYDROCHLORIDE 1 MG/ML
0.4 INJECTION, SOLUTION INTRAMUSCULAR; INTRAVENOUS; SUBCUTANEOUS EVERY 4 HOURS PRN
Status: DISCONTINUED | OUTPATIENT
Start: 2024-08-21 | End: 2024-08-23

## 2024-08-21 RX ORDER — MAGNESIUM OXIDE 400 MG/1
400 TABLET ORAL ONCE
Status: COMPLETED | OUTPATIENT
Start: 2024-08-21 | End: 2024-08-21

## 2024-08-21 RX ORDER — HYDROMORPHONE HYDROCHLORIDE 1 MG/ML
0.2 INJECTION, SOLUTION INTRAMUSCULAR; INTRAVENOUS; SUBCUTANEOUS EVERY 4 HOURS PRN
Status: DISCONTINUED | OUTPATIENT
Start: 2024-08-21 | End: 2024-08-23

## 2024-08-21 RX ORDER — GABAPENTIN 300 MG/1
600 CAPSULE ORAL 3 TIMES DAILY
Status: DISCONTINUED | OUTPATIENT
Start: 2024-08-21 | End: 2024-08-23

## 2024-08-21 RX ORDER — ASPIRIN 81 MG/1
81 TABLET ORAL DAILY
Status: DISCONTINUED | OUTPATIENT
Start: 2024-08-22 | End: 2024-08-23

## 2024-08-21 NOTE — PLAN OF CARE
Assumed care at 0730. Pt NIH 0, neuros intact. VS WNL. Pt ambulating in the victoria and room. Pain with walking bilateral hip. Gabapentin dose increased.   Problem: NEUROLOGICAL - ADULT  Goal: Achieves stable or improved neurological status  Description: INTERVENTIONS  - Assess for and report changes in neurological status  - Initiate measures to prevent increased intracranial pressure  - Maintain blood pressure and fluid volume within ordered parameters to optimize cerebral perfusion and minimize risk of hemorrhage  - Monitor temperature, glucose, and sodium. Initiate appropriate interventions as ordered  Outcome: Progressing

## 2024-08-21 NOTE — PROGRESS NOTES
[facility]   part of [facility] Hospitalist Progress Note     Greg Uriarte Patient Status:  Inpatient    1985 MRN [id number]   Location [facility] 6NE-A Attending Glenn Hoffman, DO   Hosp Day # 11 PCP Leana Gardner MD     Follow Up:  The encounter diagnosis was Acute intractable headache, unspecified headache type.    Subjective:     Patient seen and examined this morning at bedside.  Doing well. No new complaints. Sitting up in chair.     Objective:    Review of Systems:   10 point ROS completed and was negative, except for pertinent positive and negatives stated in subjective.    Vital signs:  Temp:  [96.9 °F (36.1 °C)-98.7 °F (37.1 °C)] 98.7 °F (37.1 °C)  Pulse:  [] 97  Resp:  [15-35] 19  BP: (106-182)/() 139/89  SpO2:  [96 %-100 %] 96 %    Physical Exam:    General: Alert, awake, cooperative.  Respiratory: Clear to auscultation bilaterally. No wheezes. No rhonchi.  Cardiovascular: S1, S2. Regular rate and rhythm. No murmurs.  Abdomen: Soft, nontender, nondistended.  Positive bowel sounds. No rebound or guarding.  Extremities: No edema.  No cyanosis.  Neuro:  Grossly non focal, no motor deficits.        Diagnostic Data:    Labs:  Recent Labs   Lab 24  04224  0519 24  04224  0444 24  0643   WBC 8.3 11.0 10.1 9.9 17.2*   HGB 8.8* 9.5* 8.7* 8.9* 9.5*   MCV 72.6* 73.0* 75.4* 73.8* 78.4*   .0 380.0 325.0 330.0 400.0       Recent Labs   Lab 24  04224  0444 24  0643   * 119* 207*   BUN 7* 9 8*   CREATSERUM 0.53* 0.61 0.58   CA 9.0 9.4 10.0    136 136   K 4.2 4.0 4.4    106 107   CO2 21.0 23.0 22.0     Assessment & Plan:    39 year old female with PMH sig for htn, headaches (migraines, takes ibuprofen) who presented with severe HA      R ICA/JARED Aneurysm c/b Rupture  SAH s/p Coil Embolization  NSGY/Neurology on consult  US: increasing velocities of L MCA w/ developing L-vasospasm  MRA:  see report  Plan:  - Continue Nimodipine as directed by NSGY/Neurology  - Neurochecks per protocol  - Continue TCD daily  - Continue ASA  - Defer to NSGY regarding next steps w/ MRA findings of vasospasm and persistently increased velocities on dopplers.    HTN  - BP parameters as per Neuro    Sciatica  - PRN pain meds, Lidocaine patch  - Continued PT/OT    Supplementary Documentation:     Quality:  DVT Prophylaxis: Lovenox  CODE status: Full    Briana Winters DO  [facility] Hospitalist

## 2024-08-21 NOTE — DIETARY NOTE
[facility]   part of [facility]   CLINICAL NUTRITION    Greg Uriarte     Admitting diagnosis:  Acute intractable headache, unspecified headache type [R51.9]    Ht:  5'5\"  Wt: 106.7 kg (235 lb 4.8 oz).   Body mass index is 39.16 kg/m².  IBW: 56.8 kg    Wt Readings from Last 6 Encounters:   08/21/24 106.7 kg (235 lb 4.8 oz)   05/05/24 96.2 kg (212 lb)   02/11/23 104.3 kg (230 lb)   09/01/21 107.7 kg (237 lb 6 oz)   08/12/21 106.6 kg (235 lb)   04/28/21 109.3 kg (240 lb 15.4 oz)        Labs/Meds reviewed    Diet:       Procedures    Regular/General diet Is Patient on Accuchecks? Yes     Percent Meals Eaten (last 3 days)       Date/Time Percent Meals Eaten (%)    08/18/24 0930 100 %    08/18/24 1548 100 %    08/21/24 0800 100 %    08/21/24 1200 100 %          8/21 - Pt continues to eat well - 100% breakfast and lunch today (pizza, fries, brownie, fruit for lunch). No n/v/d reported. Last BM 8/20 (formed).  Wt stable.      Pt chart reviewed d/t LOS.  Patient reports good appetite at this time.  Nursing notes reports Percent Meals Eaten (%): 100 % intake for last meal.  Tolerating po diet without diarrhea, emesis, or constipation.   No significant weight changes noted.     PMH includes GDM, HTN, Sickle Cell Trait. 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 S/p Coil emobolization.  Pt screened for LOS.  Pt tolerating 100% of meals (omelet, Lithuanian toast, fruit, juice for breakfast this AM). No n/v/d reported. Last BM 8/14. No wt loss per EMR review    Patient is at low nutrition risk at this time.    Please consult if patient status changes or nutrition issues arise.    Sudha Boyce RD, LDN, [facility]  Clinical Dietitian  Phone [phone]

## 2024-08-21 NOTE — PLAN OF CARE
Assumed patient are at 1930. A&O x4. VSS. Neuro Q2/3 intact. -200 maintained. Pain managed with scheduled and PRN meds, see MAR. Patient updated with plan of care.

## 2024-08-21 NOTE — PLAN OF CARE
Assumed pt care this am. Pt is neurologically intact, lower extremities with some tingling and pain. Pain management plan updated, see MAR. Pt on room air, afebrile, normotensive.       Pain well controlled later this afternoon per patient, able to ambulate halls and tolerate sitting in chair.

## 2024-08-21 NOTE — PROGRESS NOTES
[facility]  Neurocritical Care   Progress Note     Subjective:   TCDs showing increased velocity in left PCA, markedly elevated from day prior.  Clinically stable.  Continue to allow auto regulation.  Sodium stable, slightly volume down so we will bolus this morning. Back pain slightly better than before, currently at a 2/10.     Vitals:   Temp:  [96.9 °F (36.1 °C)-98 °F (36.7 °C)] 97.6 °F (36.4 °C)  Pulse:  [] 95  Resp:  [13-35] 18  BP: ()/() 130/74  SpO2:  [91 %-100 %] 100 %  Body mass index is 39.16 kg/m².    Intake/Output:  Intake/Output Summary (Last 24 hours) at 8/21/2024 0856  Last data filed at 8/21/2024 0800  Gross per 24 hour   Intake 2591 ml   Output 2600 ml   Net -9 ml     Scheduled Meds:   gabapentin  300 mg Oral TID    dexamethasone  4 mg Intravenous Q6H    lidocaine-menthol  2 patch Transdermal Daily    insulin aspart  1-5 Units Subcutaneous TID AC and HS    enoxaparin  40 mg Subcutaneous Daily    docusate sodium  100 mg Oral BID    niMODipine  60 mg Oral 6 times per day    aspirin  81 mg Oral Daily     Continuous Infusions:   niCARdipine Stopped (08/11/24 0000)     PRN Meds:  ibuprofen    cyclobenzaprine    oxyCODONE-acetaminophen    HYDROmorphone **OR** HYDROmorphone    acetaminophen    albuterol    polyethylene glycol (PEG 3350)    magnesium hydroxide    bisacodyl    fleet enema    labetalol    hydrALAzine    [DISCONTINUED] ondansetron **OR** metoclopramide    midazolam    ondansetron    niCARdipine    Physical Examination:   General- No acute distress  CV- RRR  Resp-no increased work of breath  Neuro-  Mental status- awake and alert, regards and follows commands, oriented x3, speech fluent  Cranial nerves- pupils equally round and reactive to light, extraocular muscles intact, face symmetric, visual fields full, hearing grossly intact, tongue midline  Motor- 5/5 throughout except including BL KE/F, DF, PF, HF BL 4/5 limited to pain.   Sens-  Intact to light  touch  Cerebellar -finger-to-nose intact bilaterally    Diagnostics:   US TRANSCRANIAL ARTERIES COMPLETE  (CPT=93886)    Result Date: 8/21/2024  CONCLUSION:  Interval increase in velocities in the proximal and distal left posterior cerebral artery distribution raises the possibility of underlying vasospasm.  This could be further assessed with MRA as clinically directed.  Please see above for further details.  LOCATION:  [facility]   Dictated by (CST): Jefe Braun MD on 8/21/2024 at 8:15 AM     Finalized by (CST): Jefe Braun MD on 8/21/2024 at 8:17 AM      Lab Review     Recent Labs   Lab 08/19/24  0420 08/20/24  0444 08/21/24  0643    136 136   K 4.2 4.0 4.4    106 107   CO2 21.0 23.0 22.0   * 119* 207*   BUN 7* 9 8*   CREATSERUM 0.53* 0.61 0.58     Recent Labs   Lab 08/18/24  0519 08/19/24  0420 08/20/24  0444   WBC 11.0 10.1 9.9   HGB 9.5* 8.7* 8.9*   .0 325.0 330.0     Assesment/Plan:   39 year old female h/o htn, prediabetes, anxiety and depression p/w severe HA c/f sentinel sah 8/9     Neuro:  Occult subarachnoid hemorrhage - PBD 12  HA/N/V in setting of ventricular enlargement and cerebral aneurysm, c/f sentinel/occult sah  LP with bloody tap, nondiagnostic, however in setting of spasms and aneurysm, treating like ruptured SAH  S/p successful coil embolization of supraclinoid R ica terminus/A1 aneurysm per PADILLA 8/10.   Cont sah protocol with neurochecks, nimotop x21 days and TCDs daily  Asa duration per PADILLA  PT/OT evals   Headaches and backpain - likely in setting of above, requiring multiple prns, no obvious focal weakness, no reported incontinence   - Increase gabapentin to 600 TID and decadron 4q6 x48 hours.   - Continue flexeril 10mg TID prn   Cardiac:  HTN - sbp goal 100-200  Pulmonary: - Stable on RA  Renal:  Creatinine within normal limits, strict ins and outs to monitor with goal being normovolemia   GI:  PO as tolerated  Heme/ID:  Leukocytosis - Likely in setting of  steroid use, Afebrile, continue to monitor   Anemia - Hemoglobin goal greater than 7, continue to monitor  Endocrine/Rheum:  Prediabetic - Monitor glucose, sliding scale insulin prn  Checklist   - Lines: PIVs   - DVT Prophylaxis: SCDs and Lovenox   - Diet: Gen diet    - IVF:   - Electrolytes: Replete per protocol   - Code Status: FULL    Dispo: CNICU    A total of 40 minutes of critical care time (exclusive of billable procedures) was administered to manage and/or prevent neurologic instability. This involved direct patient intervention, complex decision making, and/or extensive discussions with the patient, family, and clinical staff.    Alexandra Damon MD  Neurocritical Care  [facility]

## 2024-08-22 ENCOUNTER — APPOINTMENT (OUTPATIENT)
Dept: ULTRASOUND IMAGING | Facility: HOSPITAL | Age: 39
End: 2024-08-22
Attending: INTERNAL MEDICINE
Payer: MEDICAID

## 2024-08-22 LAB
ANION GAP SERPL CALC-SCNC: 5 MMOL/L (ref 0–18)
BUN BLD-MCNC: 9 MG/DL (ref 9–23)
CALCIUM BLD-MCNC: 9.9 MG/DL (ref 8.7–10.4)
CHLORIDE SERPL-SCNC: 107 MMOL/L (ref 98–112)
CO2 SERPL-SCNC: 27 MMOL/L (ref 21–32)
CREAT BLD-MCNC: 0.6 MG/DL
EGFRCR SERPLBLD CKD-EPI 2021: 118 ML/MIN/1.73M2 (ref 60–?)
ERYTHROCYTE [DISTWIDTH] IN BLOOD BY AUTOMATED COUNT: 18.7 %
GLUCOSE BLD-MCNC: 121 MG/DL (ref 70–99)
GLUCOSE BLD-MCNC: 166 MG/DL (ref 70–99)
GLUCOSE BLD-MCNC: 182 MG/DL (ref 70–99)
GLUCOSE BLD-MCNC: 202 MG/DL (ref 70–99)
GLUCOSE BLD-MCNC: 208 MG/DL (ref 70–99)
HCT VFR BLD AUTO: 31.8 %
HGB BLD-MCNC: 9.2 G/DL
MAGNESIUM SERPL-MCNC: 1.7 MG/DL (ref 1.6–2.6)
MCH RBC QN AUTO: 21.7 PG (ref 26–34)
MCHC RBC AUTO-ENTMCNC: 28.9 G/DL (ref 31–37)
MCV RBC AUTO: 75.2 FL
OSMOLALITY SERPL CALC.SUM OF ELEC: 292 MOSM/KG (ref 275–295)
PLATELET # BLD AUTO: 400 10(3)UL (ref 150–450)
POTASSIUM SERPL-SCNC: 3.9 MMOL/L (ref 3.5–5.1)
RBC # BLD AUTO: 4.23 X10(6)UL
SODIUM SERPL-SCNC: 139 MMOL/L (ref 136–145)
WBC # BLD AUTO: 23.6 X10(3) UL (ref 4–11)

## 2024-08-22 PROCEDURE — 99291 CRITICAL CARE FIRST HOUR: CPT | Performed by: OTHER

## 2024-08-22 PROCEDURE — 93886 INTRACRANIAL COMPLETE STUDY: CPT | Performed by: INTERNAL MEDICINE

## 2024-08-22 RX ORDER — MAGNESIUM OXIDE 400 MG/1
400 TABLET ORAL ONCE
Status: COMPLETED | OUTPATIENT
Start: 2024-08-22 | End: 2024-08-22

## 2024-08-22 NOTE — PROGRESS NOTES
[facility]   part of [facility] Hospitalist Progress Note     Greg Uriarte Patient Status:  Inpatient    1985 MRN [id number]   Location [facility] 6NE-A Attending Glenn Hoffman, DO   Hosp Day # 12 PCP Leana Gardner MD     Follow Up:  The encounter diagnosis was Acute intractable headache, unspecified headache type.    Subjective:     Patient seen and examined.  She had episode of vasospasm last night as well as sciatic pain.  Currently doing better.  No F/C, N/V.    Objective:    Review of Systems:   10 point ROS completed and was negative, except for pertinent positive and negatives stated in subjective.    Vital signs:  Temp:  [97.9 °F (36.6 °C)-98.4 °F (36.9 °C)] 98.2 °F (36.8 °C)  Pulse:  [] 89  Resp:  [16-34] 16  BP: (111-163)/(53-97) 142/94  SpO2:  [95 %-100 %] 100 %    Physical Exam:    General: Alert, awake, cooperative.  Respiratory: Clear to auscultation bilaterally. No wheezes. No rhonchi.  Cardiovascular: S1, S2. Regular rate and rhythm. No murmurs.  Abdomen: Soft, nontender, nondistended.  Positive bowel sounds. No rebound or guarding.  Extremities: No edema.  No cyanosis.  Neuro:  Grossly non focal, no motor deficits.        Diagnostic Data:    Labs:  Recent Labs   Lab 24  0519 24  0420 24  0444 24  0643 24  0437   WBC 11.0 10.1 9.9 17.2* 23.6*   HGB 9.5* 8.7* 8.9* 9.5* 9.2*   MCV 73.0* 75.4* 73.8* 78.4* 75.2*   .0 325.0 330.0 400.0 400.0       Recent Labs   Lab 24  0444 24  0643 24  0437   * 207* 202*   BUN 9 8* 9   CREATSERUM 0.61 0.58 0.60   CA 9.4 10.0 9.9    136 139   K 4.0 4.4 3.9    107 107   CO2 23.0 22.0 27.0     Assessment & Plan:    39 year old female with PMH sig for htn, headaches (migraines, takes ibuprofen) who presented with severe HA      R ICA/JARED Aneurysm c/b Rupture  SAH s/p Coil Embolization  NSGY/Neurology on consult  US: increasing velocities of L MCA w/  developing L-vasospasm  MRA: see report  Plan:  - Continue Nimodipine as directed by NSGY/Neurology  - Neurochecks per protocol  - Continue TCD daily  - Continue ASA  - Defer to NSGY regarding next steps w/ MRA findings of vasospasm and persistently increased velocities on dopplers.    HTN  - BP parameters as per Neuro    Sciatica  - PRN pain meds, Lidocaine patch  - Continued PT/OT    POC d/w pt who agrees.     Supplementary Documentation:     Quality:  DVT Prophylaxis: Lovenox  CODE status: Full    Nik Jordan DO  [facility]

## 2024-08-22 NOTE — PROGRESS NOTES
[facility]  Neurocritical Care   Progress Note     Subjective:   TCDs showing increased velocity in left PCA, rising. Clinically stable. Slightly volume down so we will bolus this morning.      Vitals:   Temp:  [97.9 °F (36.6 °C)-98.7 °F (37.1 °C)] 97.9 °F (36.6 °C)  Pulse:  [] 94  Resp:  [15-34] 20  BP: (111-163)/(53-97) 151/77  SpO2:  [95 %-100 %] 100 %  Body mass index is 39.16 kg/m².    Intake/Output:  Intake/Output Summary (Last 24 hours) at 8/22/2024 0916  Last data filed at 8/22/2024 0700  Gross per 24 hour   Intake 3093 ml   Output 3700 ml   Net -607 ml     Scheduled Meds:   aspirin  81 mg Oral Daily    gabapentin  600 mg Oral TID    lidocaine-menthol  2 patch Transdermal Daily    insulin aspart  1-5 Units Subcutaneous TID AC and HS    enoxaparin  40 mg Subcutaneous Daily    docusate sodium  100 mg Oral BID    niMODipine  60 mg Oral 6 times per day     Continuous Infusions:   niCARdipine Stopped (08/11/24 0000)     PRN Meds:  oxyCODONE-acetaminophen    HYDROmorphone **OR** HYDROmorphone    ibuprofen    cyclobenzaprine    acetaminophen    albuterol    polyethylene glycol (PEG 3350)    magnesium hydroxide    bisacodyl    fleet enema    labetalol    hydrALAzine    [DISCONTINUED] ondansetron **OR** metoclopramide    midazolam    ondansetron    niCARdipine    Physical Examination:   General- No acute distress  CV- RRR  Resp-no increased work of breath  Neuro-  Mental status- awake and alert, regards and follows commands, oriented x3, speech fluent  Cranial nerves- pupils equally round and reactive to light, extraocular muscles intact, face symmetric, visual fields full, hearing grossly intact, tongue midline  Motor- 5/5 throughout except including BL KE/F, DF, PF, HF BL 4/5 limited to pain. Improved from before  Sens-  Intact to light touch  Cerebellar -finger-to-nose intact bilaterally    Diagnostics:   US TRANSCRANIAL ARTERIES COMPLETE  (CPT=93886)    Result Date:  8/22/2024  CONCLUSION:  1. The right MCA ICA ratio measures 3.84.  This previously measured 2.3.  This may be indicative of mild vasospasm. 2. The velocity at the proximal left PCA is 143 centimeters/second.  This could be indicative of mild vasospasm. 3. Consider a head MRA for further assessment as clinically appropriate.   LOCATION:  [facility]   Dictated by (CST): Stromberg, LeRoy, MD on 8/22/2024 at 7:44 AM     Finalized by (CST): Stromberg, LeRoy, MD on 8/22/2024 at 7:47 AM      Lab Review     Recent Labs   Lab 08/20/24 0444 08/21/24  0643 08/22/24  0437    136 139   K 4.0 4.4 3.9    107 107   CO2 23.0 22.0 27.0   * 207* 202*   BUN 9 8* 9   CREATSERUM 0.61 0.58 0.60     Recent Labs   Lab 08/20/24 0444 08/21/24  0643 08/22/24  0437   WBC 9.9 17.2* 23.6*   HGB 8.9* 9.5* 9.2*   .0 400.0 400.0     Assesment/Plan:   39 year old female h/o htn, prediabetes, anxiety and depression p/w severe HA c/f sentinel sah 8/9     Neuro:  Occult subarachnoid hemorrhage - PBD 13  HA/N/V in setting of ventricular enlargement and cerebral aneurysm, c/f sentinel/occult sah  LP with bloody tap, nondiagnostic, however in setting of spasms and aneurysm, treating like ruptured SAH  S/p successful coil embolization of supraclinoid [place] terminus/A1 aneurysm per PADILLA 8/10.   Cont sah protocol with neurochecks, nimotop x21 days and TCDs daily   - TCDs with rising L PCA velocities so continue to monitor closely in ICU  Asa duration per PADILLA  PT/OT evals   Headaches and backpain - likely in setting of above, requiring multiple prns, no obvious focal weakness, no reported incontinence   - S/p 48 hours of steroids   - Continue gabapentin to 600 TID    - Continue flexeril 10mg TID prn   Cardiac:  HTN - sbp goal 100-200  Pulmonary: - Stable on RA  Renal:  Creatinine within normal limits, strict ins and outs to monitor with goal being normovolemia   GI:  PO as tolerated  Heme/ID:  Leukocytosis - Likely in setting of steroid  use, Afebrile, continue to monitor   Anemia - Hemoglobin goal greater than 7, continue to monitor  Endocrine/Rheum:  Prediabetic - Monitor glucose, sliding scale insulin prn  Checklist   - Lines: PIVs   - DVT Prophylaxis: SCDs and Lovenox   - Diet: Gen diet    - IVF:   - Electrolytes: Replete per protocol   - Code Status: FULL    Dispo: CNICU    A total of 40 minutes of critical care time (exclusive of billable procedures) was administered to manage and/or prevent neurologic instability. This involved direct patient intervention, complex decision making, and/or extensive discussions with the patient, family, and clinical staff.    Alexandra Damon MD  Neurocritical Care  [facility]

## 2024-08-22 NOTE — PLAN OF CARE
Assumed care at around 1930. Q2/Q3 neuro checks. Neuro intact, see flowsheets for complete neuro assessment. Voiding up in bathroom. No complaints of pain. Prn flexeril given. Mag replaced. Maintaining -200. Patient updated on plan of care. Call light within reach.         Problem: Patient/Family Goals  Goal: Patient/Family Long Term Goal  Description: Patient's Long Term Goal: To go home    Interventions:  - Pain management, neuro checks, discharge education  - See additional Care Plan goals for specific interventions  Outcome: Progressing  Goal: Patient/Family Short Term Goal  Description: Patient's Short Term Goal: be without pain    Interventions:   - Pain meds as ordered  - assess/reassess pain per protocol  - See additional Care Plan goals for specific interventions  Outcome: Progressing     Problem: METABOLIC/FLUID AND ELECTROLYTES - ADULT  Goal: Glucose maintained within prescribed range  Description: INTERVENTIONS:  - Monitor Blood Glucose as ordered  - Assess for signs and symptoms of hyperglycemia and hypoglycemia  - Administer ordered medications to maintain glucose within target range  - Assess barriers to adequate nutritional intake and initiate nutrition consult as needed  - Instruct patient on self management of diabetes  Outcome: Progressing  Goal: Electrolytes maintained within normal limits  Description: INTERVENTIONS:  - Monitor labs and rhythm and assess patient for signs and symptoms of electrolyte imbalances  - Administer electrolyte replacement as ordered  - Monitor response to electrolyte replacements, including rhythm and repeat lab results as appropriate  - Fluid restriction as ordered  - Instruct patient on fluid and nutrition restrictions as appropriate  Outcome: Progressing  Goal: Hemodynamic stability and optimal renal function maintained  Description: INTERVENTIONS:  - Monitor labs and assess for signs and symptoms of volume excess or deficit  - Monitor intake, output and  patient weight  - Monitor urine specific gravity, serum osmolarity and serum sodium as indicated or ordered  - Monitor response to interventions for patient's volume status, including labs, urine output, blood pressure (other measures as available)  - Encourage oral intake as appropriate  - Instruct patient on fluid and nutrition restrictions as appropriate  Outcome: Progressing     Problem: NEUROLOGICAL - ADULT  Goal: Achieves stable or improved neurological status  Description: INTERVENTIONS  - Assess for and report changes in neurological status  - Initiate measures to prevent increased intracranial pressure  - Maintain blood pressure and fluid volume within ordered parameters to optimize cerebral perfusion and minimize risk of hemorrhage  - Monitor temperature, glucose, and sodium. Initiate appropriate interventions as ordered  Outcome: Progressing  Goal: Absence of seizures  Description: INTERVENTIONS  - Monitor for seizure activity  - Administer anti-seizure medications as ordered  - Monitor neurological status  Outcome: Progressing  Goal: Remains free of injury related to seizure activity  Description: INTERVENTIONS:  - Maintain airway, patient safety  and administer oxygen as ordered  - Monitor patient for seizure activity, document and report duration and description of seizure to MD/LIP  - If seizure occurs, turn patient to side and suction secretions as needed  - Reorient patient post seizure  - Seizure pads on all 4 side rails  - Instruct patient/family to notify RN of any seizure activity  - Instruct patient/family to call for assistance with activity based on assessment  Outcome: Progressing  Goal: Achieves maximal functionality and self care  Description: INTERVENTIONS  - Monitor swallowing and airway patency with patient fatigue and changes in neurological status  - Encourage and assist patient to increase activity and self care with guidance from PT/OT  - Encourage visually impaired, hearing  impaired and aphasic patients to use assistive/communication devices  Outcome: Progressing     Problem: PAIN - ADULT  Goal: Verbalizes/displays adequate comfort level or patient's stated pain goal  Description: INTERVENTIONS:  - Encourage pt to monitor pain and request assistance  - Assess pain using appropriate pain scale  - Administer analgesics based on type and severity of pain and evaluate response  - Implement non-pharmacological measures as appropriate and evaluate response  - Consider cultural and social influences on pain and pain management  - Manage/alleviate anxiety  - Utilize distraction and/or relaxation techniques  - Monitor for opioid side effects  - Notify MD/LIP if interventions unsuccessful or patient reports new pain  - Anticipate increased pain with activity and pre-medicate as appropriate  Outcome: Progressing     Problem: SAFETY ADULT - FALL  Goal: Free from fall injury  Description: INTERVENTIONS:  - Assess pt frequently for physical needs  - Identify cognitive and physical deficits and behaviors that affect risk of falls.  - [place] fall precautions as indicated by assessment.  - Educate pt/family on patient safety including physical limitations  - Instruct pt to call for assistance with activity based on assessment  - Modify environment to reduce risk of injury  - Provide assistive devices as appropriate  - Consider OT/PT consult to assist with strengthening/mobility  - Encourage toileting schedule  Outcome: Progressing     Problem: DISCHARGE PLANNING  Goal: Discharge to home or other facility with appropriate resources  Description: INTERVENTIONS:  - Identify barriers to discharge w/pt and caregiver  - Include patient/family/discharge partner in discharge planning  - Arrange for needed discharge resources and transportation as appropriate  - Identify discharge learning needs (meds, wound care, etc)  - Arrange for interpreters to assist at discharge as needed  - Consider post-discharge  preferences of patient/family/discharge partner  - Complete POLST form as appropriate  - Assess patient's ability to be responsible for managing their own health  - Refer to Case Management Department for coordinating discharge planning if the patient needs post-hospital services based on physician/LIP order or complex needs related to functional status, cognitive ability or social support system  Outcome: Progressing

## 2024-08-22 NOTE — PHYSICAL THERAPY NOTE
PHYSICAL THERAPY EVALUATION - INPATIENT     Room Number: 6616/6616-A  Evaluation Date: 8/22/2024  Type of Evaluation: Re-evaluation  Physician Order: PT Eval and Treat    Presenting Problem: Aneurysmal subarachnoid hemorrhage  Co-Morbidities : HTN, HA, anxiety, depression  Reason for Therapy: Mobility Dysfunction and Discharge Planning    PHYSICAL THERAPY ASSESSMENT   Patient is currently functioning near baseline with bed mobility, transfers, and gait.  Prior to admission, patient's baseline is IND.  At this time pt continues to be near baseline with mobility and gait.    Patient will benefit from continued skilled PT Services at discharge to promote prior level of function.  Anticipate patient will return home with home health PT.    PLAN  PT Treatment Plan: Bed mobility;Body mechanics;Endurance;Energy conservation;Patient education;Family education;Gait training;Strengthening;Transfer training;Balance training  Rehab Potential : Good  Frequency (Obs): 3-5x/week  Number of Visits to Meet Established Goals: 8      CURRENT GOALS  Patient has met all skilled IPPT goals at this time. Patient will be discharged from Physical Therapy services.  Please re-order if a new functional limitation presents during this admission.        PHYSICAL THERAPY MEDICAL/SOCIAL HISTORY  History related to current admission: Patient is a 39 year old female admitted on 8/9/2024 from Home for Aneurysmal subarachnoid hemorrhage.  Pt was has been admitted since the 8/9 but was discharge off therapy caseload, however pt reported new onset of hip pain that caused not to ambulate.    HOME SITUATION  Type of Home: Apartment   Home Layout: Able to live on main level (bi level, kids' bedrooms/bathroom is upstairs, pt's bedroom/bathroom is on main level. option of elevator or spiral staircase to get upstairs)  Stairs to Enter : 0     Stairs to Bedroom: 0       Lives With: Spouse;Family (4 children ages 17 to 7 years old)  Drives: No  Patient  Owned Equipment: None  Patient Regularly Uses: Glasses (doesn't wear)    Prior Level of [place]: Pt is IND with all ADLs and mobility.  SUBJECTIVE  \"I feel better, but my hip still hurts\"      OBJECTIVE  Precautions: Other (Comment) (-180)  Fall Risk: High fall risk    WEIGHT BEARING RESTRICTION  Weight Bearing Restriction: None                PAIN ASSESSMENT  Rating: Unable to rate  Location: BLE hips  Management Techniques: Activity promotion;Repositioning    COGNITION  Overall Cognitive Status:  WFL - within functional limits    RANGE OF MOTION AND STRENGTH ASSESSMENT  Upper extremity ROM and strength are within functional limits     Lower extremity ROM is within functional limits     Lower extremity strength is within functional limits       BALANCE  Static Sitting: Fair +  Dynamic Sitting: Fair +  Static Standing: Fair  Dynamic Standing: Fair -    ADDITIONAL TESTS  Additional Tests: Modified [place]              Modified [place]: 2                  ACTIVITY TOLERANCE                         O2 WALK       NEUROLOGICAL FINDINGS                        AM-PAC '6-Clicks' INPATIENT SHORT FORM - BASIC MOBILITY  How much difficulty does the patient currently have...  Patient Difficulty: Turning over in bed (including adjusting bedclothes, sheets and blankets)?: A Little   Patient Difficulty: Sitting down on and standing up from a chair with arms (e.g., wheelchair, bedside commode, etc.): A Little   Patient Difficulty: Moving from lying on back to sitting on the side of the bed?: A Little   How much help from another person does the patient currently need...   Help from Another: Moving to and from a bed to a chair (including a wheelchair)?: A Little   Help from Another: Need to walk in hospital room?: A Little   Help from Another: Climbing 3-5 steps with a railing?: A Lot       AM-PAC Score:  Raw Score: 17   Approx Degree of Impairment: 50.57%   Standardized Score (AM-PAC Scale): 42.13   CMS Modifier (G-Code):  CK    FUNCTIONAL ABILITY STATUS  Gait Assessment   Functional Mobility/Gait Assessment  Gait Assistance: Supervision  Distance (ft): 120  Assistive Device: Rolling walker  Pattern: Within Functional Limits    Skilled Therapy Provided     Bed Mobility:  Rolling: NT  Supine to sit: NT   Sit to supine: NT     Transfer Mobility:  Sit to stand: Mod I   Stand to sit: Mod I  Gait = Sup 120 ft using RW    Therapist's Comments: PT was observed with no LOB when ambulating. Educated pt to follow up with OP PT/HHPT for further examine hip pain. At this time pt's pain is controlled to allow pt to ambulate.    Exercise/Education Provided:  Gait training  Transfer training    Patient End of Session: Up in chair;Needs met;Call light within reach;RN aware of session/findings;All patient questions and concerns addressed;Family present      Patient Evaluation Complexity Level:  History Moderate - 1 or 2 personal factors and/or co-morbidities   Examination of body systems Low - addressing 1-2 elements   Clinical Presentation Low - Stable   Clinical Decision Making Low - Stable       PT Session Time: 23 minutes  Therapeutic Activity: 15 minutes

## 2024-08-23 ENCOUNTER — APPOINTMENT (OUTPATIENT)
Dept: ULTRASOUND IMAGING | Facility: HOSPITAL | Age: 39
End: 2024-08-23
Attending: INTERNAL MEDICINE
Payer: MEDICAID

## 2024-08-23 VITALS
OXYGEN SATURATION: 100 % | HEART RATE: 97 BPM | WEIGHT: 235.31 LBS | DIASTOLIC BLOOD PRESSURE: 79 MMHG | SYSTOLIC BLOOD PRESSURE: 128 MMHG | TEMPERATURE: 98 F | BODY MASS INDEX: 39 KG/M2 | RESPIRATION RATE: 23 BRPM

## 2024-08-23 LAB
ANION GAP SERPL CALC-SCNC: 4 MMOL/L (ref 0–18)
BUN BLD-MCNC: 13 MG/DL (ref 9–23)
CALCIUM BLD-MCNC: 9.2 MG/DL (ref 8.7–10.4)
CHLORIDE SERPL-SCNC: 109 MMOL/L (ref 98–112)
CO2 SERPL-SCNC: 26 MMOL/L (ref 21–32)
CREAT BLD-MCNC: 0.69 MG/DL
EGFRCR SERPLBLD CKD-EPI 2021: 115 ML/MIN/1.73M2 (ref 60–?)
ERYTHROCYTE [DISTWIDTH] IN BLOOD BY AUTOMATED COUNT: 18.4 %
GLUCOSE BLD-MCNC: 123 MG/DL (ref 70–99)
GLUCOSE BLD-MCNC: 130 MG/DL (ref 70–99)
GLUCOSE BLD-MCNC: 132 MG/DL (ref 70–99)
HCT VFR BLD AUTO: 29.6 %
HGB BLD-MCNC: 8.9 G/DL
MAGNESIUM SERPL-MCNC: 1.7 MG/DL (ref 1.6–2.6)
MCH RBC QN AUTO: 22.1 PG (ref 26–34)
MCHC RBC AUTO-ENTMCNC: 30.1 G/DL (ref 31–37)
MCV RBC AUTO: 73.4 FL
OSMOLALITY SERPL CALC.SUM OF ELEC: 290 MOSM/KG (ref 275–295)
PLATELET # BLD AUTO: 323 10(3)UL (ref 150–450)
POTASSIUM SERPL-SCNC: 3.7 MMOL/L (ref 3.5–5.1)
RBC # BLD AUTO: 4.03 X10(6)UL
SODIUM SERPL-SCNC: 139 MMOL/L (ref 136–145)
WBC # BLD AUTO: 17.7 X10(3) UL (ref 4–11)

## 2024-08-23 PROCEDURE — 93886 INTRACRANIAL COMPLETE STUDY: CPT | Performed by: INTERNAL MEDICINE

## 2024-08-23 PROCEDURE — 99233 SBSQ HOSP IP/OBS HIGH 50: CPT | Performed by: OTHER

## 2024-08-23 RX ORDER — GABAPENTIN 300 MG/1
600 CAPSULE ORAL 3 TIMES DAILY
Qty: 90 CAPSULE | Refills: 0 | Status: SHIPPED | OUTPATIENT
Start: 2024-08-23

## 2024-08-23 RX ORDER — ACETAMINOPHEN 325 MG/1
650 TABLET ORAL EVERY 4 HOURS PRN
Qty: 30 TABLET | Refills: 0 | Status: SHIPPED | COMMUNITY
Start: 2024-08-23

## 2024-08-23 RX ORDER — POTASSIUM CHLORIDE 1500 MG/1
40 TABLET, EXTENDED RELEASE ORAL ONCE
Status: COMPLETED | OUTPATIENT
Start: 2024-08-23 | End: 2024-08-23

## 2024-08-23 RX ORDER — NIMODIPINE 30 MG/1
60 CAPSULE, LIQUID FILLED ORAL
Qty: 144 CAPSULE | Refills: 0 | Status: SHIPPED | OUTPATIENT
Start: 2024-08-23 | End: 2024-09-04

## 2024-08-23 RX ORDER — CYCLOBENZAPRINE HCL 10 MG
10 TABLET ORAL 3 TIMES DAILY PRN
Qty: 30 TABLET | Refills: 0 | Status: SHIPPED | OUTPATIENT
Start: 2024-08-23

## 2024-08-23 RX ORDER — ASPIRIN 81 MG/1
81 TABLET ORAL DAILY
Qty: 30 TABLET | Refills: 0 | Status: SHIPPED | COMMUNITY
Start: 2024-08-24

## 2024-08-23 RX ORDER — MAGNESIUM OXIDE 400 MG/1
400 TABLET ORAL ONCE
Status: COMPLETED | OUTPATIENT
Start: 2024-08-23 | End: 2024-08-23

## 2024-08-23 RX ORDER — IBUPROFEN 400 MG/1
400 TABLET, FILM COATED ORAL EVERY 6 HOURS PRN
Qty: 30 TABLET | Refills: 0 | Status: SHIPPED | COMMUNITY
Start: 2024-08-23

## 2024-08-23 NOTE — PLAN OF CARE
Assumed care at around 1930. Q2/Q3 neuro checks. Neuro intact, see flowsheets for complete neuro assessment. Voiding up in bathroom. No complaints of pain. Prn flexeril given. Maintaining -200. Potassium and mag replaced. Patient ambulating in halls. Patient updated on plan of care. Call light within reach.         Problem: Patient/Family Goals  Goal: Patient/Family Long Term Goal  Description: Patient's Long Term Goal: To go home    Interventions:  - Pain management, neuro checks, discharge education  - See additional Care Plan goals for specific interventions  Outcome: Progressing  Goal: Patient/Family Short Term Goal  Description: Patient's Short Term Goal: be without pain    Interventions:   - Pain meds as ordered  - assess/reassess pain per protocol  - See additional Care Plan goals for specific interventions  Outcome: Progressing     Problem: METABOLIC/FLUID AND ELECTROLYTES - ADULT  Goal: Glucose maintained within prescribed range  Description: INTERVENTIONS:  - Monitor Blood Glucose as ordered  - Assess for signs and symptoms of hyperglycemia and hypoglycemia  - Administer ordered medications to maintain glucose within target range  - Assess barriers to adequate nutritional intake and initiate nutrition consult as needed  - Instruct patient on self management of diabetes  Outcome: Progressing  Goal: Electrolytes maintained within normal limits  Description: INTERVENTIONS:  - Monitor labs and rhythm and assess patient for signs and symptoms of electrolyte imbalances  - Administer electrolyte replacement as ordered  - Monitor response to electrolyte replacements, including rhythm and repeat lab results as appropriate  - Fluid restriction as ordered  - Instruct patient on fluid and nutrition restrictions as appropriate  Outcome: Progressing  Goal: Hemodynamic stability and optimal renal function maintained  Description: INTERVENTIONS:  - Monitor labs and assess for signs and symptoms of volume excess or  deficit  - Monitor intake, output and patient weight  - Monitor urine specific gravity, serum osmolarity and serum sodium as indicated or ordered  - Monitor response to interventions for patient's volume status, including labs, urine output, blood pressure (other measures as available)  - Encourage oral intake as appropriate  - Instruct patient on fluid and nutrition restrictions as appropriate  Outcome: Progressing     Problem: NEUROLOGICAL - ADULT  Goal: Achieves stable or improved neurological status  Description: INTERVENTIONS  - Assess for and report changes in neurological status  - Initiate measures to prevent increased intracranial pressure  - Maintain blood pressure and fluid volume within ordered parameters to optimize cerebral perfusion and minimize risk of hemorrhage  - Monitor temperature, glucose, and sodium. Initiate appropriate interventions as ordered  Outcome: Progressing  Goal: Absence of seizures  Description: INTERVENTIONS  - Monitor for seizure activity  - Administer anti-seizure medications as ordered  - Monitor neurological status  Outcome: Progressing  Goal: Remains free of injury related to seizure activity  Description: INTERVENTIONS:  - Maintain airway, patient safety  and administer oxygen as ordered  - Monitor patient for seizure activity, document and report duration and description of seizure to MD/LIP  - If seizure occurs, turn patient to side and suction secretions as needed  - Reorient patient post seizure  - Seizure pads on all 4 side rails  - Instruct patient/family to notify RN of any seizure activity  - Instruct patient/family to call for assistance with activity based on assessment  Outcome: Progressing  Goal: Achieves maximal functionality and self care  Description: INTERVENTIONS  - Monitor swallowing and airway patency with patient fatigue and changes in neurological status  - Encourage and assist patient to increase activity and self care with guidance from PT/OT  -  Encourage visually impaired, hearing impaired and aphasic patients to use assistive/communication devices  Outcome: Progressing     Problem: PAIN - ADULT  Goal: Verbalizes/displays adequate comfort level or patient's stated pain goal  Description: INTERVENTIONS:  - Encourage pt to monitor pain and request assistance  - Assess pain using appropriate pain scale  - Administer analgesics based on type and severity of pain and evaluate response  - Implement non-pharmacological measures as appropriate and evaluate response  - Consider cultural and social influences on pain and pain management  - Manage/alleviate anxiety  - Utilize distraction and/or relaxation techniques  - Monitor for opioid side effects  - Notify MD/LIP if interventions unsuccessful or patient reports new pain  - Anticipate increased pain with activity and pre-medicate as appropriate  Outcome: Progressing     Problem: SAFETY ADULT - FALL  Goal: Free from fall injury  Description: INTERVENTIONS:  - Assess pt frequently for physical needs  - Identify cognitive and physical deficits and behaviors that affect risk of falls.  - [place] fall precautions as indicated by assessment.  - Educate pt/family on patient safety including physical limitations  - Instruct pt to call for assistance with activity based on assessment  - Modify environment to reduce risk of injury  - Provide assistive devices as appropriate  - Consider OT/PT consult to assist with strengthening/mobility  - Encourage toileting schedule  Outcome: Progressing     Problem: DISCHARGE PLANNING  Goal: Discharge to home or other facility with appropriate resources  Description: INTERVENTIONS:  - Identify barriers to discharge w/pt and caregiver  - Include patient/family/discharge partner in discharge planning  - Arrange for needed discharge resources and transportation as appropriate  - Identify discharge learning needs (meds, wound care, etc)  - Arrange for interpreters to assist at discharge as  needed  - Consider post-discharge preferences of patient/family/discharge partner  - Complete POLST form as appropriate  - Assess patient's ability to be responsible for managing their own health  - Refer to Case Management Department for coordinating discharge planning if the patient needs post-hospital services based on physician/LIP order or complex needs related to functional status, cognitive ability or social support system  Outcome: Progressing

## 2024-08-23 NOTE — CM/SW NOTE
08/23/24 1100   Discharge disposition   Expected discharge disposition Home-Health   Post Acute Care Provider Residential   Discharge transportation Private car     Noted pt ready for discharge today and plan is for [facility] to follow for 4 home PT visits.    Notified [facility] liaison, Isabelle, of discharge.    / to remain available for support and/or discharge planning.     Misti Lemus MBA MSN, RN CTL/  [id number]

## 2024-08-23 NOTE — PLAN OF CARE
A/Ox4, complex neuro exam WDL. Denies headache, back, or leg pain. Walked around unit, tolerated well. Ok to dc home per Dr. Damon. Dr. Barraza notified. PIV dc'd. Pt collected belongings and dressed independently. Dc instructions reviewed with pt, including follow up, medications, complication recognition. Pt verbalized understandiing.

## 2024-08-23 NOTE — PROGRESS NOTES
[facility]  Neurocritical Care   Progress Note     Subjective:   TCDs showing improved velocities in left PCA.  Euvolemic, sodium stable. No new complaints, pain under control.     Vitals:   Temp:  [98 °F (36.7 °C)-98.2 °F (36.8 °C)] 98.2 °F (36.8 °C)  Pulse:  [] 104  Resp:  [13-29] 24  BP: ()/(61-97) 97/74  SpO2:  [97 %-100 %] 98 %  Body mass index is 39.16 kg/m².    Intake/Output:  Intake/Output Summary (Last 24 hours) at 8/23/2024 0920  Last data filed at 8/23/2024 0856  Gross per 24 hour   Intake 1306 ml   Output 1600 ml   Net -294 ml     Scheduled Meds:   aspirin  81 mg Oral Daily    gabapentin  600 mg Oral TID    lidocaine-menthol  2 patch Transdermal Daily    insulin aspart  1-5 Units Subcutaneous TID AC and HS    enoxaparin  40 mg Subcutaneous Daily    docusate sodium  100 mg Oral BID    niMODipine  60 mg Oral 6 times per day     Continuous Infusions:   niCARdipine Stopped (08/11/24 0000)     PRN Meds:  oxyCODONE-acetaminophen    HYDROmorphone **OR** HYDROmorphone    ibuprofen    cyclobenzaprine    acetaminophen    albuterol    polyethylene glycol (PEG 3350)    magnesium hydroxide    bisacodyl    fleet enema    labetalol    hydrALAzine    [DISCONTINUED] ondansetron **OR** metoclopramide    midazolam    ondansetron    niCARdipine    Physical Examination:   General- No acute distress  CV- RRR  Resp-no increased work of breath  Neuro-  Mental status- awake and alert, regards and follows commands, oriented x3, speech fluent  Cranial nerves- pupils equally round and reactive to light, extraocular muscles intact, face symmetric, visual fields full, hearing grossly intact, tongue midline  Motor- Full strength throughout, nonfocal  Cerebellar -finger-to-nose intact bilaterally    Diagnostics:   US TRANSCRANIAL ARTERIES COMPLETE  (CPT=93886)    Result Date: 8/23/2024  CONCLUSION:  There is interval improvement in findings of vasospasm with decrease in the MCA to ICA ratios bilaterally.    LOCATION:  [place]   Dictated by (CST): John Dooley MD on 8/23/2024 at 7:59 AM     Finalized by (CST): John Dooley MD on 8/23/2024 at 8:01 AM      Lab Review     Recent Labs   Lab 08/21/24  0643 08/22/24  0437 08/23/24  0543    139 139   K 4.4 3.9 3.7    107 109   CO2 22.0 27.0 26.0   * 202* 130*   BUN 8* 9 13   CREATSERUM 0.58 0.60 0.69     Recent Labs   Lab 08/21/24  0643 08/22/24  0437 08/23/24  0543   WBC 17.2* 23.6* 17.7*   HGB 9.5* 9.2* 8.9*   .0 400.0 323.0     Assesment/Plan:   39 year old female h/o htn, prediabetes, anxiety and depression p/w severe HA c/f sentinel sah 8/9     Neuro:  Occult subarachnoid hemorrhage - PBD 14  HA/N/V in setting of ventricular enlargement and cerebral aneurysm, c/f sentinel/occult sah  LP with bloody tap, nondiagnostic, however in setting of spasms and aneurysm, treating like ruptured SAH  S/p successful coil embolization of supraclinoid R ica terminus/A1 aneurysm per APDILLA 8/10.   Continue nimotop x21 days   TCDs with improving left PCA velocities so ok to dc  Asa duration per PADILLA  PT/OT evals   Headaches and backpain - likely in setting of above, requiring multiple prns, no obvious focal weakness, no reported incontinence   - S/p 48 hours of steroids   - Continue gabapentin to 600 TID  - Continue flexeril 10mg TID prn   No further inpatient neurological work-up needed at this time, will follow peripherally, call if questions or concerns, follow-up with neurology in 4 to 6 weeks  Cardiac:  HTN - sbp goal 100-140  Pulmonary: - Stable on RA  Renal:  Creatinine within normal limits, strict ins and outs to monitor with goal being normovolemia   GI:  PO as tolerated  Heme/ID:  Leukocytosis - Likely in setting of steroid use, Afebrile, continue to monitor   Anemia - Hemoglobin goal greater than 7, continue to monitor  Endocrine/Rheum:  Prediabetic - Monitor glucose, sliding scale insulin prn  Checklist   - Lines: PIVs   - DVT Prophylaxis: SCDs and  Lovenox   - Diet: Gen diet    - IVF:   - Electrolytes: Replete per protocol   - Code Status: FULL    Dispo: Dc     Alexandra Damon MD  Neurocritical Care  [facility]

## 2024-08-26 ENCOUNTER — PATIENT OUTREACH (OUTPATIENT)
Dept: CASE MANAGEMENT | Age: 39
End: 2024-08-26

## 2024-08-26 NOTE — PROGRESS NOTES
Transitional Care Management   Discharge Date: 24  Contact Date: 2024    R ICA/JARED Aneurysm c/b Rupture  SAH s/p Coil Embolization    TCM Initial Assessment    General:  Assessment completed with: Patient  Patient Subjective: Pt reports she is doing okay. Pt denies concerns at this time. Pt stated she has an appt with her Outside PCP, Dr. Gardner tomorrow and an appt on 9/10 with Neurology.  Pt confirmed she did  and started her new medication. Pt declined to review med list at this time. Pt reports she is urinating well, able to have a BM and eating/drinking well. Pt feels a little usnteadsy on her feet but is declining ED. Plans to talk to PCP tomorrow.  Chief Complaint: R ICA/JARED Aneurysm c/b Rupture, SAH s/p Coil Embolization  Verify patient name and  with patient/ caregiver: Yes    Hospital Stay/Discharge:  Tell me what you understand of why you were in the hospital or emergency department: A headache  Prior to leaving the hospital were your Discharge Instructions reviewed with you?: Yes  Did you receive a copy of your written Discharge Instructions?: Yes  What questions do you have about your Discharge Instructions?: None at this time  Do you feel better or worse since you left the hospital or emergency department?: Better    Follow - Up Appointment:  Do you have a follow-up appointment?: Yes  Date: 24  Physician: PCP- Dr. Gardner and Neuro on 9/10/24  Are there any barriers to getting to your follow-up appointment?: No    Home Health/DME:  Prior to leaving the hospital was Home Health (HH) arranged for you?: No     Prior to leaving the hospital or emergency department was Durable Medical Equipment (DME), medical supplies, or infusions arranged for you?: No  Are DME/medical supply/infusions needs identified by staff during this assessment?: No     Medications/Diet:  Did any of your medications change, during or after your hospital stay or ED visit?: Yes  Do you have your new or updated  medications?: Yes  Do you understand what your medications are for and possible side effects?: Yes  Are there any reasons that keep you from taking your medication as prescribed?: No  Any concerns about medication refills?: No    Were you given a different diet per your Discharge Instructions?: No     Questions/Concerns:  Do you have any questions or concerns that have not been discussed?: No       All d/c instructions reviewed with the pt. Reviewed when to call MD vs when to call 911 or go the ED. Educated pt on the importance of taking all meds as prescribed as well as close f/u with PCP/specialists. Pt verbalized understanding and will contact the office with any further questions or concerns.       Medication Review:   Current Outpatient Medications   Medication Sig Dispense Refill    niMODipine 30 MG Oral Cap Take 2 capsules (60 mg total) by mouth every 4 (four) hours for 12 days. 144 capsule 0    lidocaine-menthol 4-1 % External Patch Place 2 patches onto the skin daily. To lower back 30 patch 0    acetaminophen 325 MG Oral Tab Take 2 tablets (650 mg total) by mouth every 4 (four) hours as needed for Pain. 30 tablet 0    ibuprofen 400 MG Oral Tab Take 1 tablet (400 mg total) by mouth every 6 (six) hours as needed for Pain. 30 tablet 0    aspirin 81 MG Oral Tab EC Take 1 tablet (81 mg total) by mouth daily. 30 tablet 0    gabapentin 300 MG Oral Cap Take 2 capsules (600 mg total) by mouth 3 (three) times daily. 90 capsule 0    cyclobenzaprine 10 MG Oral Tab Take 1 tablet (10 mg total) by mouth 3 (three) times daily as needed for Muscle spasms. 30 tablet 0     Did patient review medications using current pill bottles and not just a medication list?  No- declined to review list at this time.   Discharge medications reviewed/discussed/and reconciled against outpatient medications with patient.  Any changes or updates to medications sent to primary care provider.  Patient Declined    SDOH:   Transportation Needs: No  Transportation Needs (8/26/2024)    Transportation Needs     Lack of Transportation: No     Car Seat: Not on file       Financial Resource Strain: Low Risk  (8/26/2024)    Financial Resource Strain     Difficulty of Paying Living Expenses: Not very hard     Med Affordability: No           Follow-up Appointments: Reviewed recommended follow up appointments. Pt denies any barriers to keeping/getting to HFU appts.   Your appointments       Date & Time Appointment Department ([place])    Sep 10, 2024 2:00 PM CDT Follow Up Visit with Lan Collins MD, PhD [facility] ([facility])        Oct 14, 2024 3:00 PM CDT Exam - New Patient with Sweta Hayes MD [facility] ([facility] )              [facility]  [facility]   [address] [facility] [id number] [phone] [address]  [facility] [address]  [facility] [id number] [phone]            Transitional Care Clinic  Was TCC Ordered: Yes  Was TCC Scheduled: No, Explain : pt has an outside PCP she has an appt with tomorrow, declined TCC.    - If yes: []  Advised patient to bring all medications and blood glucose meter/supplies if applicable.    Primary Care Provider (If no TCC appointment)  Does patient already have a PCP appointment scheduled? Yes- Pt stated she is scheduled with outside PCP Dr. Gardner tomorrow.     Specialist  Does the patient have any other follow-up appointment(s) that need to be scheduled? Yes   -If yes: Nurse Care Manager reviewed upcoming specialist appointments with patient: Yes   -Does the patient need assistance scheduling appointment(s): No- Pt stated she has an appt with Neuro on 9/10/24.    CCM referral placed:  Not Applicable    Book By Date:  9/6/24

## 2024-09-04 ENCOUNTER — TELEPHONE (OUTPATIENT)
Dept: NEUROLOGY | Facility: CLINIC | Age: 39
End: 2024-09-04

## 2024-09-04 NOTE — TELEPHONE ENCOUNTER
Pt seen IP by Dr. Collins and has NP appt with Dr. Hayes.     Discussed with Dr. Dye and he will sign orders for this time only.    Faxed signed orders to [facility] with note that future orders will need to be addressed to Dr. Hayes who will be pt's provider.     Copy of orders sent to scanning.

## 2024-09-09 ENCOUNTER — TELEPHONE (OUTPATIENT)
Dept: NEUROLOGY | Facility: CLINIC | Age: 39
End: 2024-09-09

## 2024-09-10 ENCOUNTER — OFFICE VISIT (OUTPATIENT)
Dept: SURGERY | Facility: CLINIC | Age: 39
End: 2024-09-10
Payer: MEDICAID

## 2024-09-10 VITALS
WEIGHT: 235 LBS | HEIGHT: 65 IN | BODY MASS INDEX: 39.15 KG/M2 | SYSTOLIC BLOOD PRESSURE: 130 MMHG | HEART RATE: 106 BPM | DIASTOLIC BLOOD PRESSURE: 80 MMHG

## 2024-09-10 DIAGNOSIS — I67.1 CEREBRAL ANEURYSM (HCC): Primary | ICD-10-CM

## 2024-09-10 DIAGNOSIS — I60.8 ANEURYSMAL SUBARACHNOID HEMORRHAGE (HCC): ICD-10-CM

## 2024-09-10 PROCEDURE — 99024 POSTOP FOLLOW-UP VISIT: CPT | Performed by: NURSE PRACTITIONER

## 2024-09-10 NOTE — PATIENT INSTRUCTIONS
Follow up with Neurology for headache management  Get head CT Saturday 2PM in [place] will call with results  Get an MRA brain in 2 months  Then follow up with Dr Collins

## 2024-09-10 NOTE — PROGRESS NOTES
[facility]  Interventional Neuroradiology Clinic Note        Neurology  [facility]    Leana Gradner MD  Primary Care      Date of Service: 9/10/24    Dear Colleagues,     We had the pleasure of seeing Greg Uriarte in the Interventional Neuroradiology Clinic at [facility].     She is a 39 year old female with past medical history of active smoking, hypertension, pre-diabetes, anxiety/depression, sickle cell trait, and family history of neurofibromatosis presented to the [facility] ER in the late evening of 8/9/2024 after acute onset of severe occipital headache and pressure radiating to the vertex with associated nausea/vomiting and photophobia.  She describes her headache as atypical from her occasional migraine headaches without improvement overnight in the emergency room.  Although no definitive intracranial hemorrhage was identified on initial CT head study, there appears to be mild interval ventriculomegaly in comparison to prior CT head study in 2016.  Subsequent CTA head/neck and MRI brain imaging studies identified a 4 mm cerebral aneurysm arising from the supraclinoid right internal carotid artery terminus projecting posteriorly as well punctate foci of T1 hyperintensity or hemorrhage in the left temporal lobe, but no definitive subarachnoid hemorrhage adjacent to the aneurysm in the suprasellar cistern, as further evaluated today by MRA/MR vessel wall imaging.  Lumbar puncture was performed in the ER with CSF analysis demonstrating a compromised bloody tap with > 50,000 RBCs in the 4th tube (non-diagnostic for SAH), elevated WBCs, and markedly elevated protein.  Due to inability to entirely exclude sentinel headache or occult subarachnoid hemorrhage form ruptured cerebral aneurysm with equivocal clinical and imaging criteria, our neurointerventional surgery service was consulted for emergent endovascular treatment by the neurocritical care and neurosurgery  services.    Patient has since quit smoking. Continues to have headaches. Patient states she is wanting to get back to work. Has had some issues with equilibrium and unsteadiness.        Review of Systems:  The patient denies associated neck pain, nausea, or vomiting.  The patient denies cranial nerve symptoms such as blurred vision, diplopia, photophobia, ptosis, facial weakness/paresthesias, hearing loss, vertigo, tinnitus, hoarseness, dysphagia, or alternations in taste.  The patient denies upper/lower extremity weakness or paresthesias.  The patient denies dizziness, falls, difficulty with ambulation, dysarthria, or speech expression/comprehension.     The patient denies constitutional symptoms, such as fevers, chills, night sweats, weight loss, chest pain, shortness of breath, gastrointestinal or genitourinary symptoms    Past Medical/Surgical History:  Past Medical History:    Anemia    Anxiety and depression    Essential hypertension    Gestational diabetes (HCC)    diet    Hyperlipidemia    Prediabetes    A1c 6.2    Sickle cell trait (HCC)       Past Surgical History:   Procedure Laterality Date    Anesth, section      x 3       Social History:    reports that she has quit smoking. Her smoking use included cigarettes. She has never used smokeless tobacco. She reports that she does not drink alcohol and does not use drugs.     / Children  Employed  No history of smoking, alcohol/drug abuse.     Family History:  Family History   Problem Relation Age of Onset    Diabetes Mother     High Blood Pressure Mother     Other (heart murmur) Mother     Other (sarcoma) Brother     Other (neurofibromatosis) Brother     Depression Brother     Anxiety Brother        No family history of intracranial/aortic aneurysms, connective tissue or collagen vascular diseases    Medications:    Current Outpatient Medications:     lidocaine-menthol 4-1 % External Patch, Place 2 patches onto the skin daily. To lower back,  Disp: 30 patch, Rfl: 0    acetaminophen 325 MG Oral Tab, Take 2 tablets (650 mg total) by mouth every 4 (four) hours as needed for Pain., Disp: 30 tablet, Rfl: 0    ibuprofen 400 MG Oral Tab, Take 1 tablet (400 mg total) by mouth every 6 (six) hours as needed for Pain., Disp: 30 tablet, Rfl: 0    aspirin 81 MG Oral Tab EC, Take 1 tablet (81 mg total) by mouth daily., Disp: 30 tablet, Rfl: 0    gabapentin 300 MG Oral Cap, Take 2 capsules (600 mg total) by mouth 3 (three) times daily., Disp: 90 capsule, Rfl: 0    cyclobenzaprine 10 MG Oral Tab, Take 1 tablet (10 mg total) by mouth 3 (three) times daily as needed for Muscle spasms., Disp: 30 tablet, Rfl: 0    Allergies:   Allergies   Allergen Reactions    Amoxicillin RASH    Latex HIVES    Banana ITCHING       Physical Exam:  [facility] 04/27/2024 (Exact Date)   GENERAL:  The patient was pleasant and cooperative throughout the examination.    HEENT/NEURO:  The patient's head is normocephalic with anicteric sclerae, moist mucous membranes, and nasal cavities.  There is no evidence of carotid bruits, lymphadenopathy, or masses. The thyroid is midline.  The patient is  alert, awake, and oriented with normal intelligence, memory, language, and judgement.  On cranial nerve examination, full visual fields are noted by confrontational testing.  The patient's pupils are equally responsive and reactive to light.  Extraocular muscles are intact.  There is no evidence of afferent pupillary defect, anisocoria, myosis, or ptosis.  Facial expression and sensation are symmetric and intact bilaterally in the V1-V3 distributions.  The patient's hearing is grossly intact.  The palate elevates symmetrically and the voice is normal.  Sternocleidomastoid and trapezius muscle strength are intact.  The tongue demonstrates protrusion in the midline.  The patient's strength and sensation are intact in bilateral upper and lower extremities. There is no pronator drift on Gary testing.  Romberg testing  is negative.  There is no evidence of nystagmus, ataxia, dysarthria, or dysmetria with finger-to-nose testing.      Imaging:   Coiling 8/10/24  1. Successful coil embolization of irregular saccular 4.5 x 4 x 4 mm supraclinoid right internal carotid artery terminus/A1 anterior cerebral artery aneurysm projecting posteriorly from a narrow 1-2 mm neck, suspected to be a ruptured aneurysm with   occult subarachnoid hemorrhage or symptomatic aneurysm presenting with sentinel symptoms.   2. Mild dorsal wall irregularity is seen along the supraclinoid segment of the left internal carotid artery with focal vessel kinking or 30% stenosis just proximal to the anterior choroidal artery origin, but with no corresponding abnormal MR VWI   findings, may represent chronic intracranial dissection pathology or vasculopathy       Assessment/Plan:  Greg Uriarte is a 39 year old female s/p coiling of irregular saccular 4.5 x 4 x 4 mm supraclinoid right internal carotid artery terminus/A1 anterior cerebral artery aneurysm projecting posteriorly from a narrow 1-2 mm neck, suspected to be a ruptured aneurysm with occult subarachnoid hemorrhage or symptomatic aneurysm presenting with sentinel symptoms.     She also has a narrowing of the LICA supraclinoid segment that should be monitored going forward.     Since she is having some unsteadiness of her gait, I recommend obtaining a head CT to evaluate for delayed hydrocephalus. We will do an MRA brain in 2 months for aneurysm follow up then see Dr Collins in clinic. Follow up with Neurology for headache.    Finally, we informed that patient to seek emergent evaluation if encountering any severe headache or new neurological deficits.     We counseled the patient on intracranial aneurysm risk factor reduction for hemorrhagic stroke prevention  Aspirin may have inherent anti-inflammatory effects in stabilizing/regressing atherosclerotic plaques or preventing aneurysm progression/rupture.  Additionally, we counseled the patient on lifestyle modifications including a healthy low fat/low calorie diet, regular moderate exercise, and abstaining from smoking or alcohol/drug use.     Thank you for allowing us to participate in the care of this very interesting patient. Please do not hesitate to contact us with any further questions or concerns.     Sincerely,     JOSEPH[facility]  9/10/2024 1:56 PM      I spent approximately 45 minutes reviewing the patient's chart and imaging, with at least half the time spent on counseling the patient on her pathology and conservative medical management/treatment plan.

## 2024-09-10 NOTE — PROGRESS NOTES
For new patient:  PADILLA procedure in hospital Yes  Procedure: coil embolization of subarachnoid right internal carotid artery/ A1 anterior cerebral artery aneurysm  on 8.10.24  New patient here for hospital follow up for SAH with coil embolization.   Imaging and date of imaging: Ultrasounds of bilateral arteries while inpatient; MRA Brain , 8.17.24  Family history of aneurysms, brain bleed, or AVM  No  History of High Blood Pressure:  Yes  taking NA BP medications   Anticoagulants: ASA 81 MG  Neurologist: ROBBY  Cardiologist: ROBBY  PCP: Dr. Gardner    Review of Systems:    Hand Dominance: right  General: fatigue  Neuro: no symptoms reported  Head: headache, dizziness/spinning, and ringing in ears  Musculoskeletal: no symptoms reported  Cardiovascular: palpitations  Gastrointestinal: changes in bowel habits  Genitourinary: no symptoms reported  Respiratory: daytime sleepiness  Eyes: no symptoms reported  Skin: no symptoms reported  Mouth & throat: excessive dry mouth  Neck: pain and stiffness  Nose: no symptoms reported  Psychiatric: anxiety, sadness, moodiness, and irritability     MRS:0  Barthel:100

## 2024-09-11 ENCOUNTER — TELEPHONE (OUTPATIENT)
Dept: NEUROLOGY | Facility: CLINIC | Age: 39
End: 2024-09-11

## 2024-09-11 NOTE — TELEPHONE ENCOUNTER
Note     Pt seen IP by Dr. Collins and has NP appt with Dr. Hayes.      Discussed with Dr. Dye and he will sign orders for this time only.     Faxed signed orders to [facility] with note that future orders will need to be addressed to Dr. Hayes who will be pt's provider.      Copy of orders sent to scanning.

## 2024-09-12 ENCOUNTER — TELEPHONE (OUTPATIENT)
Dept: NEUROLOGY | Facility: CLINIC | Age: 39
End: 2024-09-12

## 2024-09-12 NOTE — TELEPHONE ENCOUNTER
[facility] # [id number] plan of care and update for patient placed in providers folder for signature.

## 2024-09-14 ENCOUNTER — HOSPITAL ENCOUNTER (OUTPATIENT)
Dept: CT IMAGING | Age: 39
Discharge: HOME | End: 2024-09-14
Attending: NURSE PRACTITIONER
Payer: MEDICAID

## 2024-09-14 DIAGNOSIS — I60.8 ANEURYSMAL SUBARACHNOID HEMORRHAGE (HCC): ICD-10-CM

## 2024-09-14 PROCEDURE — 70450 CT HEAD/BRAIN W/O DYE: CPT | Performed by: NURSE PRACTITIONER

## 2024-09-16 NOTE — TELEPHONE ENCOUNTER
[facility] # [id number] plan of care and update for patient reviewed by provider faxed  to [facility] with confirmation, sent to scanning.

## 2024-09-18 ENCOUNTER — TELEPHONE (OUTPATIENT)
Dept: NEUROLOGY | Facility: CLINIC | Age: 39
End: 2024-09-18

## 2024-09-18 NOTE — TELEPHONE ENCOUNTER
[facility] notes/orders #[id number] placed in Dr Dye's folder for review and signature.  SN EFFECTIVE 9/8/2024- NO VISITS REQUESTED/SCHEDULED.  PT EFFECTIVE 8/25/2024 1WK2.

## 2024-09-23 NOTE — TELEPHONE ENCOUNTER
[facility] notes/orders reviewed and signed by provider, faxed to [facility] with fax confirmation received and sent to scanning.

## 2024-09-30 ENCOUNTER — TELEPHONE (OUTPATIENT)
Dept: NEUROLOGY | Facility: CLINIC | Age: 39
End: 2024-09-30

## 2024-09-30 NOTE — TELEPHONE ENCOUNTER
Patient is calling requesting a return to work letter, patient stated she saw Dr. Hayes at the hospital on 08/27. Patient stated she received a return to work letter for 9/14/24, patient is requesting anew one for her to return to work on 10/14/24.

## 2024-09-30 NOTE — TELEPHONE ENCOUNTER
Advised patient that Dr. Hayes has never seen patient and would not be able to release patient to work. He also would need to assess patient before a letter can be written to keep patient off work.     Patient verbalized understanding.

## 2024-10-14 ENCOUNTER — OFFICE VISIT (OUTPATIENT)
Facility: CLINIC | Age: 39
End: 2024-10-14
Payer: MEDICAID

## 2024-10-14 VITALS
WEIGHT: 246 LBS | DIASTOLIC BLOOD PRESSURE: 72 MMHG | BODY MASS INDEX: 41 KG/M2 | HEART RATE: 100 BPM | SYSTOLIC BLOOD PRESSURE: 136 MMHG | RESPIRATION RATE: 16 BRPM

## 2024-10-14 DIAGNOSIS — R26.89 IMBALANCE: ICD-10-CM

## 2024-10-14 DIAGNOSIS — R51.9 NONINTRACTABLE HEADACHE, UNSPECIFIED CHRONICITY PATTERN, UNSPECIFIED HEADACHE TYPE: ICD-10-CM

## 2024-10-14 DIAGNOSIS — I60.7 CEREBRAL ANEURYSM RUPTURE (HCC): Primary | ICD-10-CM

## 2024-10-14 PROCEDURE — 99215 OFFICE O/P EST HI 40 MIN: CPT | Performed by: OTHER

## 2024-10-14 RX ORDER — SERTRALINE HYDROCHLORIDE 25 MG/1
25 TABLET, FILM COATED ORAL DAILY
COMMUNITY

## 2024-10-14 RX ORDER — GABAPENTIN 300 MG/1
600 CAPSULE ORAL 3 TIMES DAILY
Qty: 180 CAPSULE | Refills: 11 | Status: SHIPPED | OUTPATIENT
Start: 2024-10-14 | End: 2025-10-09

## 2024-10-14 RX ORDER — GABAPENTIN 100 MG/1
100 CAPSULE ORAL 3 TIMES DAILY
COMMUNITY
Start: 2024-10-09

## 2024-10-14 RX ORDER — ENALAPRIL MALEATE 5 MG/1
5 TABLET ORAL DAILY
COMMUNITY
Start: 2024-10-09 | End: 2024-11-08

## 2024-10-14 NOTE — PATIENT INSTRUCTIONS
Instructions from Dr. Hayes:       Increase gabapentin:  100 mg in morning, 100 mg in afternoon, and 300 mg in evening for 3 days, then  100 mg in morning, 300 mg in afternoon, and 300 mg in evening for 3 days, then  300 mg in morning, 300 mg in afternoon, and 300 mg in evening for 3 days, then  300 mg in morning, 300 mg in afternoon, and 600 mg in evening for 3 days, then  300 mg in morning, 600 mg in afternoon, and 600 mg in evening for 3 days, then  600 mg in morning, 600 mg in afternoon, and 600 mg in evening    Please talk to your primary care doctor - since Gabapentin causes swelling, would they consider changing your BP medication to a water pill      ~~~~~~~~~~~~~~~~~~~~~~~     Refill policies:    Allow 2-3 business days for refills; controlled substances may take longer.  Contact your pharmacy at least 5 days prior to running out of medication and have them send an electronic request or submit request through the “request refill” option in your [employer] account.  Refills are not addressed on weekends; covering physicians do not authorize routine medications on weekends.  No narcotics or controlled substances are refilled after noon on Fridays or by on call physicians.  By law, narcotics must be electronically prescribed.  A 30 day supply with no refills is the maximum allowed.  If your prescription is due for a refill, you may be due for a follow up appointment.  To best provide you care, patients receiving routine medications need to be seen at least once a year.  Patients receiving narcotic/controlled substance medications need to be seen at least once every 3 months.  In the event that your preferred pharmacy does not have the requested medication in stock (e.g. Backordered), it is your responsibility to find another pharmacy that has the requested medication available.  We will gladly send a new prescription to that pharmacy at your request.    Scheduling Tests:    If your physician has ordered radiology  tests such as MRI or CT scans, please contact Central Scheduling at [phone] right away to schedule the test.  Once scheduled, the [facility] the [facility] Centralized Referral Team will work with your insurance carrier to obtain pre-certification or prior authorization.  Depending on your insurance carrier, approval may take 3-10 days.  It is highly recommended patients assure they have received an authorization before having a test performed.  If test is done without insurance authorization, patient may be responsible for the entire amount billed.      Precertification and Prior Authorizations:  If your physician has recommended that you have a procedure or additional testing performed the [facility] the [facility] Centralized Referral Team will contact your insurance carrier to obtain pre-certification or prior authorization.    You are strongly encouraged to contact your insurance carrier to verify that your procedure/test has been approved and is a COVERED benefit.  Although the [facility] the [facility] Centralized Referral Team does its due diligence, the insurance carrier gives the disclaimer that \"Although the procedure is authorized, this does not guarantee payment.\"    Ultimately the patient is responsible for payment.   Thank you for your understanding in this matter.  Paperwork Completion:  If you require FMLA or disability paperwork for your recovery, please make sure to either drop it off or have it faxed to our office at [phone]. Be sure the form has your name and date of birth on it.  The form will be faxed to our Forms Department and they will complete it for you.  There is a 25$ fee for all forms that need to be filled out.  Please be aware there is a 10-14 day turnaround time.  You will need to sign a release of information (BUNNY) form if your paperwork does not come with one.  You may call the Forms Department with any questions at [phone].  Their fax number is [phone].

## 2024-10-14 NOTE — PROGRESS NOTES
Neurology History & Physical     ASSESSMENT & PLAN:      ICD-10-CM    1. Cerebral aneurysm rupture (HCC)  I60.7 OP REFERRAL TO [facility]     OP REFERRAL TO [facility]      2. Nonintractable headache, unspecified chronicity pattern, unspecified headache type  R51.9 OP REFERRAL TO [facility]     OP REFERRAL TO [facility]      3. Imbalance  R26.89 OP REFERRAL TO [facility]     OP REFERRAL TO [facility]        R JARED aneurysm s/p coil embo.  Neuro IR following, planning MRA in Nov 2024.  Ongoing headaches and imbalance due to this.  Recommended to gradually increase GBP to 600  mg TID.  Medication overuse headache and limiting PRNs to twice a week was discussed.  PT/OT ordered for imbalance.    Her condition poses threat to bodily function.  I recommend her being off from work until the next visit with me in 2 months.    PCP is evaluating chest pain and tachycardia, and had her start BP medication since nimodipine was stopped.    We discussed medication side effects.  She has had edema from GBP in past.  I advised her to d/w PCP - perhaps a diuretic could treat BP and edema.    Return in about 2 months (around 12/14/2024).       ~~~~~~~~~~~~~~~~~~~~~~~~~~~    CHIEF COMPLAINT / REASON FOR VISIT:    Chief Complaint   Patient presents with    Neurologic Problem     Brain aneurysm; poor equilibrium, headaches     HISTORY OBTAINED FROM:  Patient and others as above  Chart review    HISTORY:  Greg Uriarte is a 39 year old female with SCT, HTN, pre-DM, anxiety, admitted with severe headache.  There was concern for SAH/aneurysm and she had imaging and LP, though initially nondiagnostic, was treated as SAH.  CTA/MRA showed R Pcomm aneurysm, and ventriculomegaly.  Angio showed it was actually an ICA terminus / A1 JARED aneurysm, this was coil embolized.  Monitored in hospital for about 2 weeks.    INTERIM  HISTORY:  Ongoing dysequilibrium/imbalance, and headaches.  Had head pressure worse when laying down, headache is daily.  Takes  mg TID, as well as PRN tylenol (4 pills a day).  Was on  mg TID for 2 weeks after discharge, it was quite effective and well tolerated.    Feels equilibrium is off lida when standing up, and feels LH and faint but no LOC/syncope.  She has to take a lot of breaks around the house.  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  Noticing HR consistently over 100 at home, and chest pain.    DATA REVIEWED:  As documented in the history    Sept 2024  CBC Hb 9.5  CMP unremarkable   A1c 6.3  Neuro IR note   Head CT unremarkable   (I independently analyzed and interpreted this, and I agree with the reading physician report(s).)    Aug 2024  Neuro notes  CTA h/n, MRI brain, MRA head  (I independently analyzed and interpreted this, and I agree with the reading physician report(s).)    PHYSICAL EXAMINATION:  /72 (BP Location: Radial, Patient Position: Sitting, Cuff Size: adult)   Pulse 100   Resp 16   Wt 246 lb (111.6 kg)   LMP 04/27/2024 (Exact Date)   BMI 40.94 kg/m²     Gen: in NAD  MSE: nl attn/conc, nl language, nl fund of knowledge  CN: EOMI, VFF, nl facial mvmt, nl palate, nl tongue  Motor: 5/5 x4  DTR: 2+ BUE and BLE  Coord: nl FTN b/I  Gait: normal    Allergies[1]    Current medications:   gabapentin 100 MG Oral Cap Take 1 capsule (100 mg total) by mouth 3 (three) times daily. Titrating up to 300mg TID      sertraline 25 MG Oral Tab Take 1 tablet (25 mg total) by mouth daily.      enalapril 5 MG Oral Tab Take 1 tablet (5 mg total) by mouth daily.      gabapentin 300 MG Oral Cap Take 2 capsules (600 mg total) by mouth in the morning, at noon, and at bedtime. 180 capsule 11    lidocaine-menthol 4-1 % External Patch Place 2 patches onto the skin daily. To lower back 30 patch 0    acetaminophen 325 MG Oral Tab Take 2 tablets (650 mg total) by mouth every 4 (four) hours as needed for  Pain. 30 tablet 0    ibuprofen 400 MG Oral Tab Take 1 tablet (400 mg total) by mouth every 6 (six) hours as needed for Pain. 30 tablet 0    aspirin 81 MG Oral Tab EC Take 1 tablet (81 mg total) by mouth daily. 30 tablet 0    cyclobenzaprine 10 MG Oral Tab Take 1 tablet (10 mg total) by mouth 3 (three) times daily as needed for Muscle spasms. 30 tablet 0       Past Medical History:    Anemia    Anxiety    Anxiety and depression    Depression    Essential hypertension    Gestational diabetes (HCC)    diet    Hyperlipidemia    Migraines    Prediabetes    A1c 6.2    ruptured cerebral aneurysm    Sickle cell trait (HCC)    Traumatic brain injury (HCC)       Past Surgical History:   Procedure Laterality Date    Anesth, section      x 3    Ir head and neck embolization  08/10/2024       Social History     Socioeconomic History    Marital status:    Tobacco Use    Smoking status: Former     Current packs/day: 0.00     Types: Cigarettes     Quit date: 2024     Years since quittin.1     Passive exposure: Yes    Smokeless tobacco: Never    Tobacco comments:     Had a brain aneurysm and stopped   Vaping Use    Vaping status: Never Used   Substance and Sexual Activity    Alcohol use: No    Drug use: Yes     Frequency: 1.0 times per week     Types: Cannabis     Comment: gummies    Sexual activity: Yes     Partners: Male     Birth control/protection: Tubal Ligation   Other Topics Concern    Caffeine Concern No     Comment: 1 coffee per day    Exercise No    Seat Belt Yes       Family History   Problem Relation Age of Onset    Diabetes Mother     High Blood Pressure Mother     Other (heart murmur) Mother     Other (sarcoma) Brother     Other (neurofibromatosis) Brother     Depression Brother     Anxiety Brother     Diabetes Brother         type 2       Sweta Hayes MD, FAES, FAAN  Board-Certified in Neurology, Epilepsy, and Clinical Neurophysiology  [facility]  [place]         [1]   Allergies  Allergen Reactions    Amoxicillin RASH    Latex HIVES    Banana ITCHING

## 2024-12-27 ENCOUNTER — HOSPITAL ENCOUNTER (OUTPATIENT)
Dept: MRI IMAGING | Facility: HOSPITAL | Age: 39
Discharge: HOME | End: 2024-12-27
Attending: NURSE PRACTITIONER
Payer: MEDICAID

## 2024-12-27 DIAGNOSIS — I67.1 CEREBRAL ANEURYSM (HCC): ICD-10-CM

## 2024-12-27 PROCEDURE — 70544 MR ANGIOGRAPHY HEAD W/O DYE: CPT | Performed by: NURSE PRACTITIONER

## 2024-12-28 ENCOUNTER — TELEPHONE (OUTPATIENT)
Dept: SURGERY | Facility: CLINIC | Age: 39
End: 2024-12-28

## 2025-01-23 ENCOUNTER — OFFICE VISIT (OUTPATIENT)
Dept: SURGERY | Facility: CLINIC | Age: 40
End: 2025-01-23
Payer: MEDICAID

## 2025-01-23 VITALS
SYSTOLIC BLOOD PRESSURE: 120 MMHG | HEART RATE: 110 BPM | BODY MASS INDEX: 40.48 KG/M2 | WEIGHT: 243 LBS | OXYGEN SATURATION: 98 % | DIASTOLIC BLOOD PRESSURE: 78 MMHG | HEIGHT: 65 IN

## 2025-01-23 DIAGNOSIS — I67.1 CEREBRAL ANEURYSM (HCC): Primary | ICD-10-CM

## 2025-01-23 PROCEDURE — 99214 OFFICE O/P EST MOD 30 MIN: CPT

## 2025-01-23 RX ORDER — ENALAPRIL MALEATE 10 MG/1
10 TABLET ORAL DAILY
COMMUNITY
Start: 2024-12-05

## 2025-01-23 NOTE — PROGRESS NOTES
Patient here for follow up after serial imaging.  Patient has a history of HA, aneurysm rupture, pre-diabetes.  Patient reported to ED on 8.9.24 with an intractable headache after a work incident. Patient's fiance called 911 for patient as she was not able to move in the shower.  Patient stopped smoking in August 2024. Patient initially used a walker and stopped using it in September.   Last office visit on 9.10.24  New imaging since last seen: MRA Brain on 12.27.24  Last procedure: COIL EMBOLIZATION OF SUPRACLINOID RIGHT INTERNAL CAROTID ARTERY TERMINUS/A1 ANTERIOR CEREBRAL ARTERY ANEURYSM by Dr. Collins on 8.10.24. patient reports that every time she lies on the right side of her head, she has a spasm and pain in the right side of her head. She is having eye specialist check out her vision changes.   Changes since LOV:   Antiplatelet:  81 mg ASA-stopped 2 months ago at GI doctor's recommendation  Review of Systems:    Hand Dominance: right  General: no symptoms reported  Neuro: memory loss  Head: headache, dizziness/spinning, and ringing in ears  Musculoskeletal: no symptoms reported  Cardiovascular: palpitations-random chest pain  Gastrointestinal: no symptoms reported  Genitourinary: no symptoms reported  Respiratory: no symptoms reported  Eyes: blurred vision  Skin: no symptoms reported  Mouth & throat: bleeding gums  Neck: pain-right sided stiffness  Nose: no symptoms reported  Psychiatric: no symptoms reported     MRS-0  Barthel-100

## 2025-01-23 NOTE — PROGRESS NOTES
[facility]  Interventional Neuroradiology Clinic Note      Sweta Hayes MD  Neurology  [facility]    Leana Gardner MD  Primary Care      Date of Service: 1/23/2025     Dear Colleagues,     We had the pleasure of seeing Greg Uriarte in the Interventional Neuroradiology Clinic at [facility]. She is a 39 year old female with past medical history of smoking, hypertension, prediabetes, sickle cell trait, and family history of neurofibromatosis presented to the [facility] on 8/9/2024 with acute onset of severe atypical headache.  Subsequent CTA head/neck and MRI brain/vessel wall imaging findings identified a supraclinoid right internal carotid artery terminus aneurysm projecting posteriorly, punctate intracranial hemorrhage foci in the right temporal lobe, and interval  development of mild ventriculomegaly.  Due to equivocal clinical/imaging findings and a traumatic lumbar spinal tap, occult Hunt-Ignacio grade 1/Cole grade 1 subarachnoid hemorrhage (SA)  or a symptomatic cerebral aneurysm presenting with sentinel symptoms could not be excluded and the patient underwent endovascular coil embolization treatment of an irregular 4 mm saccular ruptured aneurysm resulting in complete occlusion without complications. She had a relatively uncomplicated course in the NeuroICU with no interval progression of hydrocephalus or significant cerebral vasospasm, and was discharged to home. She returns to our neurointerventional surgery clinic for her 6 month clinical and angiographic follow-up evaluation.     Review of Systems:  +neck pain / tightness after sleeping, improved gabapentin  +blurred vision bilaterally since SAH  +bilateral tinnitus   The patient denies associated headaches, neck pain, nausea, or vomiting.  The patient denies cranial nerve symptoms such as diplopia, photophobia, ptosis, facial weakness/paresthesias, hearing loss, vertigo, hoarseness, dysphagia, or  alternations in taste.  The patient denies upper/lower extremity weakness or paresthesias.  The patient denies dizziness, imbalance, falls, difficulty with ambulation, coordination, dysarthria, or speech expression/comprehension.     Past Medical/Surgical History:  Past Medical History:    Anemia    Anxiety and depression    Essential hypertension    Gestational diabetes (HCC)    diet    Hyperlipidemia    Migraines    Prediabetes    A1c 6.2    Ruptured cerebral aneurysm    Sickle cell trait (HCC)    Traumatic brain injury (HCC)     Past Surgical History:   Procedure Laterality Date    Anesth, section      x 3    Ir head and neck embolization  08/10/2024     Social History:   Quit smoking after SAH, 15 years x 1/2 ppd history  +cannabis / oral   No ETOH/drug abuse    Family History:  Problem Relation Age of Onset    Diabetes Mother     High Blood Pressure Mother     Other (heart murmur) Mother     Other (sarcoma) Brother     Other (neurofibromatosis) Brother     Depression Brother     Anxiety Brother     Diabetes Brother         type 2   No family history of intracranial/aortic aneurysms    Medications:    enalapril 10 MG Oral Tab, Take 1 tablet (10 mg total) by mouth daily., Disp: , Rfl:     sertraline 25 MG Oral Tab, Take 1 tablet (25 mg total) by mouth daily., Disp: , Rfl:     gabapentin 300 MG Oral Cap, Take 2 capsules (600 mg total) by mouth in the morning, at noon, and at bedtime., Disp: 180 capsule, Rfl: 11    lidocaine-menthol 4-1 % External Patch, Place 2 patches onto the skin daily. To lower back, Disp: 30 patch, Rfl: 0    acetaminophen 325 MG Oral Tab, Take 2 tablets (650 mg total) by mouth every 4 (four) hours as needed for Pain., Disp: 30 tablet, Rfl: 0    cyclobenzaprine 10 MG Oral Tab, Take 1 tablet (10 mg total) by mouth 3 (three) times daily as needed for Muscle spasms., Disp: 30 tablet, Rfl: 0    gabapentin 100 MG Oral Cap, Take 1 capsule (100 mg total) by mouth 3 (three) times daily.  Titrating up to 300mg TID (Patient not taking: Reported on 1/23/2025), Disp: , Rfl:     ibuprofen 400 MG Oral Tab, Take 1 tablet (400 mg total) by mouth every 6 (six) hours as needed for Pain. (Patient not taking: Reported on 1/23/2025), Disp: 30 tablet, Rfl: 0     Allergies:   Allergen Reactions    Amoxicillin RASH    Latex HIVES    Banana ITCHING     Physical Exam:  /78 (BP Location: Right arm, Patient Position: Sitting, Cuff Size: large)   Pulse 110   Ht 65\"   Wt 243 lb (110.2 kg)   LMP 04/27/2024 (Exact Date)   SpO2 98%   BMI 40.44 kg/m²   GENERAL:  The patient was pleasant and cooperative throughout the examination.    HEENT/NEURO:  The patient's head is normocephalic with anicteric sclerae, moist mucous membranes, and nasal cavities.  There is no evidence of carotid bruits, lymphadenopathy, or masses. The thyroid is midline.  The patient is  alert, awake, and oriented with normal intelligence, memory, language, and judgement.  On cranial nerve examination, full visual fields are noted by confrontational testing.  The patient's pupils are equally responsive and reactive to light.  Extraocular muscles are intact.  There is no evidence of afferent pupillary defect, anisocoria, myosis, or ptosis.  Facial expression and sensation are symmetric and intact bilaterally in the V1-V3 distributions.  The patient's hearing is grossly intact.  The palate elevates symmetrically and the voice is normal.  Sternocleidomastoid and trapezius muscle strength are intact.  The tongue demonstrates protrusion in the midline.  The patient's strength and sensation are intact in bilateral upper and lower extremities. There is no pronator drift on [place] testing.  + Romberg testing  There is no evidence of nystagmus, ataxia, dysarthria, or dysmetria with finger-to-nose testing.      The patient's right wrist/radial artery is soft and nontender with an intact pulse and no ecchymosis, pulsatile mass, erythema, or discharge  to  suggest pseudoaneurysm or infection.    Assessment/Plan:  Greg Uriarte is a right/left handed 39 year old female with several cerebrovascular risk factors of ex-smoking, hypertension, prediabetes, sickle cell trait, and family history of neurofibromatosis  presented in August 2024 with a sentinel headache related to HHG1/FG1 SAH status post coil embolization of ruptured supraclinoid right ICA aneurysm resulting in complete occlusion  without complications. Recent 6 month follow-up MRA brain study demonstrate stable post-surgical changes with no evidence of coil compaction or aneurysm recanalization/regrowth to suggest aneurysm recurrence.     We informed the patient that endovascular treatment with coil embolization of small intracranial aneurysms is associated with a 3-5% risk of aneurysm recurrence and she may be at risk for new cerebral aneurysm development with CV risk factors and possibly genetic predisposition with family history of NF-1, requiring lifetime serial imaging monitoring and potential retreatment.   Hence, we will obtain a baseline MRA head study post-procedure and upon follow-up at 1 year (August 2025) , 2 years, 4 years, 7 years, 10 years, and every 3-5 years to assess for any interval coil compaction or aneurysm recanalization/regrowth. If any significant recurrence is identified to suggest aneurysm instability, we will reconsult with patient regarding retreatment options.      We have referred the patient to continue follow-up with the Neurology service via Dr. Hayes regarding her persisting neck pain/back pain with responsiveness to gabapentin as well as to seek Ophthalmology consultation regarding her blurred vision.    We counseled the patient on  intracranial aneurysm/cerebrovascular risk factor reduction for ischemic/hemorrhagic stroke prevention including  monitoring for hypertension, hyperlipidemia, and diabetes with treatment if needed.  Due to the patient's young age, will recommend  termination of her aspirin 81 mg antiplatelet therapy as there is no further thromboembolic risk from the stable coil mass.  Additionally, we counseled the patient on lifestyle modifications, maintaining healthy low fat/low calorie diet, regular moderate exercise, and continuing to abstain from smoking or alcohol/drug use.     Thank you for allowing us to participate in the care of this very interesting patient. Please do not hesitate to contact us with any further questions or concerns.     Sincerely,       Lan Collins MD, PhD  RAMIRE[facility] Fong  Department of Radiology, Neurology, and Neurological Surgery  [facility] [place]    1/23/2025 9:40 AM      I spent approximately 45 minutes reviewing the patient's chart and imaging, with at least half the time spent on counseling the patient on her previously ruptured cerebral aneurysm status post endovascular treatment, clinical and imaging follow-up.

## 2025-01-23 NOTE — PATIENT INSTRUCTIONS
Refill policies:    Allow 2-3 business days for refills; controlled substances may take longer.  Contact your pharmacy at least 5 days prior to running out of medication and have them send an electronic request or submit request through the “request refill” option in your [employer] account.  Refills are not addressed on weekends; covering physicians do not authorize routine medications on weekends.  No narcotics or controlled substances are refilled after noon on Fridays or by on call physicians.  By law, narcotics must be electronically prescribed.  A 30 day supply with no refills is the maximum allowed.  If your prescription is due for a refill, you may be due for a follow up appointment.  To best provide you care, patients receiving routine medications need to be seen at least once a year.  Patients receiving narcotic/controlled substance medications need to be seen at least once every 3 months.  In the event that your preferred pharmacy does not have the requested medication in stock (e.g. Backordered), it is your responsibility to find another pharmacy that has the requested medication available.  We will gladly send a new prescription to that pharmacy at your request.    Scheduling Tests:    If your physician has ordered radiology tests such as MRI or CT scans, please contact Central Scheduling at [phone] right away to schedule the test.  Once scheduled, the [facility] Centralized Referral Team will work with your insurance carrier to obtain pre-certification or prior authorization.  Depending on your insurance carrier, approval may take 3-10 days.  It is highly recommended patients assure they have received an authorization before having a test performed.  If test is done without insurance authorization, patient may be responsible for the entire amount billed.      Precertification and Prior Authorizations:  If your physician has recommended that you have a procedure or additional testing performed the [facility]  Centralized Referral Team will contact your insurance carrier to obtain pre-certification or prior authorization.    You are strongly encouraged to contact your insurance carrier to verify that your procedure/test has been approved and is a COVERED benefit.  Although the [facility] Centralized Referral Team does its due diligence, the insurance carrier gives the disclaimer that \"Although the procedure is authorized, this does not guarantee payment.\"    Ultimately the patient is responsible for payment.   Thank you for your understanding in this matter.  Paperwork Completion:  If you require FMLA or disability paperwork for your recovery, please make sure to either drop it off or have it faxed to our office at [phone]. Be sure the form has your name and date of birth on it.  The form will be faxed to our Forms Department and they will complete it for you.  There is a 25$ fee for all forms that need to be filled out.  Please be aware there is a 10-14 day turnaround time.  You will need to sign a release of information (BUNNY) form if your paperwork does not come with one.  You may call the Forms Department with any questions at [phone].  Their fax number is [phone].

## 2025-02-24 ENCOUNTER — OFFICE VISIT (OUTPATIENT)
Facility: CLINIC | Age: 40
End: 2025-02-24
Payer: MEDICAID

## 2025-02-24 VITALS — RESPIRATION RATE: 16 BRPM | SYSTOLIC BLOOD PRESSURE: 122 MMHG | DIASTOLIC BLOOD PRESSURE: 78 MMHG | HEART RATE: 96 BPM

## 2025-02-24 DIAGNOSIS — R51.9 NONINTRACTABLE HEADACHE, UNSPECIFIED CHRONICITY PATTERN, UNSPECIFIED HEADACHE TYPE: ICD-10-CM

## 2025-02-24 DIAGNOSIS — H53.8 BLURRY VISION: ICD-10-CM

## 2025-02-24 DIAGNOSIS — R26.89 IMBALANCE: ICD-10-CM

## 2025-02-24 DIAGNOSIS — I60.7 CEREBRAL ANEURYSM RUPTURE (HCC): Primary | ICD-10-CM

## 2025-02-24 DIAGNOSIS — M54.9 BACK PAIN, UNSPECIFIED BACK LOCATION, UNSPECIFIED BACK PAIN LATERALITY, UNSPECIFIED CHRONICITY: ICD-10-CM

## 2025-02-24 PROCEDURE — 99214 OFFICE O/P EST MOD 30 MIN: CPT | Performed by: OTHER

## 2025-02-24 RX ORDER — HYDROCODONE BITARTRATE AND ACETAMINOPHEN 5; 325 MG/1; MG/1
1 TABLET ORAL
COMMUNITY
Start: 2025-01-23

## 2025-02-24 RX ORDER — GABAPENTIN 300 MG/1
300 CAPSULE ORAL 3 TIMES DAILY
Qty: 90 CAPSULE | Refills: 11 | Status: SHIPPED | OUTPATIENT
Start: 2025-02-24 | End: 2026-02-19

## 2025-02-24 NOTE — PATIENT INSTRUCTIONS
Instructions from Dr. Hayes:       Please make sure you will be seeing ophthalmologist (MD or DO, but not OD).  IF your eye clinic doesn't have an appointment, you can see [facility].    ~~~~~~~~~~~~~~~~~~~~~~~  After your visit at the [facility]  today,  please direct any follow up questions or medication needs to the staff in our  [place] office so that your concerns may be promptly addressed.  We are available through [employer] or at the numbers below:    The phone number is:   [phone] option #1    The fax number is:  [phone]    Your pharmacy should also send any requests electronically to the [place] office.    Refill policies:    Allow 2-3 business days for refills; controlled substances may take longer.  Contact your pharmacy at least 5 days prior to running out of medication and have them send an electronic request or submit request through the “request refill” option in your [employer] account.  Refills are not addressed on weekends; covering physicians do not authorize routine medications on weekends.  No narcotics or controlled substances are refilled after noon on Fridays or by on call physicians.  By law, narcotics must be electronically prescribed.  A 30 day supply with no refills is the maximum allowed.  If your prescription is due for a refill, you may be due for a follow up appointment.  To best provide you care, patients receiving routine medications need to be seen at least once a year.  Patients receiving narcotic/controlled substance medications need to be seen at least once every 3 months.  In the event that your preferred pharmacy does not have the requested medication in stock (e.g. Backordered), it is your responsibility to find another pharmacy that has the requested medication available.  We will gladly send a new prescription to that pharmacy at your request.    Scheduling Tests:    If your physician has ordered radiology tests such as MRI or CT scans,  please contact Central Scheduling at [phone] right away to schedule the test.  Once scheduled, the [facility] Centralized Referral Team will work with your insurance carrier to obtain pre-certification or prior authorization.  Depending on your insurance carrier, approval may take 3-10 days.  It is highly recommended patients assure they have received an authorization before having a test performed.  If test is done without insurance authorization, patient may be responsible for the entire amount billed.      Precertification and Prior Authorizations:  If your physician has recommended that you have a procedure or additional testing performed the [facility] Centralized Referral Team will contact your insurance carrier to obtain pre-certification or prior authorization.    You are strongly encouraged to contact your insurance carrier to verify that your procedure/test has been approved and is a COVERED benefit.  Although the [facility] Centralized Referral Team does its due diligence, the insurance carrier gives the disclaimer that \"Although the procedure is authorized, this does not guarantee payment.\"    Ultimately the patient is responsible for payment.   Thank you for your understanding in this matter.  Paperwork Completion:  If you require FMLA or disability paperwork for your recovery, please make sure to either drop it off or have it faxed to our office at [phone]. Be sure the form has your name and date of birth on it.  The form will be faxed to our Forms Department and they will complete it for you.  There is a 25$ fee for all forms that need to be filled out.  Please be aware there is a 10-14 day turnaround time.  You will need to sign a release of information (BUNNY) form if your paperwork does not come with one.  You may call the Forms Department with any questions at [phone].  Their fax number is [phone].

## 2025-02-24 NOTE — PROGRESS NOTES
Neurology History & Physical     ASSESSMENT & PLAN:      ICD-10-CM    1. Cerebral aneurysm rupture (HCC)  I60.7       2. Nonintractable headache, unspecified chronicity pattern, unspecified headache type  R51.9       3. Imbalance  R26.89 OP REFERRAL TO [facility]      4. Blurry vision  H53.8 OPHTHALMOLOGY - EXTERNAL      5. Back pain, unspecified back location, unspecified back pain laterality, unspecified chronicity  M54.9 OP REFERRAL TO [facility]        R JARED aneurysm s/p coil embo.  Neuro IR following, planning MRA in Nov 2024.  Much improved headaches and imbalance, cont  mg TID.  She is tolerating the swelling, which is ongoing.  PT reordered for imbalance and for back pain.    Blurry vision is a new issue.  No headache, no pulsatile tinnitus.  Advised to see ophtho.    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    Return in about 3 months (around 5/24/2025).       ~~~~~~~~~~~~~~~~~~~~~~~~~~~    CHIEF COMPLAINT / REASON FOR VISIT:    Chief Complaint   Patient presents with    Neurologic Problem     Headaches are much better. Equilibrium is better. Blurred vision has gotten worse since last visit.      HISTORY OBTAINED FROM:  Patient and others as above  Chart review    HISTORY:  Greg Uriarte is a 39 year old female with SCT, HTN, pre-DM, anxiety, admitted with severe headache.  There was concern for SAH/aneurysm and she had imaging and LP, though initially nondiagnostic, was treated as SAH.  CTA/MRA showed R Pcomm aneurysm, and ventriculomegaly.  Angio showed it was actually an ICA terminus / A1 JARED aneurysm, this was coil embolized.  Monitored in hospital for about 2 weeks.    INTERIM HISTORY:  Improved dysequilibrium/imbalance and headaches, but chronic back pain is ongoing.  Did not do PT.  Taking  mg TID without side effects.    For about past 2 months, blurry vision, daily though intermittent.  No double vision.  Has some non-pulsatile tinnitus.       Not back to work due to endometriosis (procedure scheduled 2/28/25).    DATA REVIEWED:  As documented in the history    Jan 2025  INR 0.99  CBC Hb 9.7    Dec 2024  MRA head  \"1. No hemodynamically significant stenosis or occlusion.   2. Stable susceptibility in the region the right PCOM at the site aneurysm coil.   3. Mild irregularity in the left supraclinoid ICA appears stable \"    Oct 2024  CMP unremarkable    Sept 2024  CBC Hb 9.5  CMP unremarkable   A1c 6.3  Neuro IR note   Head CT unremarkable   (I independently analyzed and interpreted this, and I agree with the reading physician report(s).)    Aug 2024  Neuro notes  CTA h/n, MRI brain, MRA head  (I independently analyzed and interpreted this, and I agree with the reading physician report(s).)    PHYSICAL EXAMINATION:  /78   Pulse 96   Resp 16   LMP 04/27/2024 (Exact Date)     Gen: in NAD  MSE: nl attn/conc, nl language, nl fund of knowledge  CN: EOMI, VFF, nl facial mvmt, nl palate, nl tongue  Motor: 5/5 x4  DTR: 2+ BUE and BLE  Coord: nl FTN b/I  Gait: normal    Allergies[1]    Current medications:   gabapentin 300 MG Oral Cap Take 1 capsule (300 mg total) by mouth in the morning, at noon, and at bedtime. 90 capsule 11    enalapril 10 MG Oral Tab Take 1 tablet (10 mg total) by mouth daily.      sertraline 25 MG Oral Tab Take 1 tablet (25 mg total) by mouth daily.      lidocaine-menthol 4-1 % External Patch Place 2 patches onto the skin daily. To lower back 30 patch 0    acetaminophen 325 MG Oral Tab Take 2 tablets (650 mg total) by mouth every 4 (four) hours as needed for Pain. 30 tablet 0    ibuprofen 400 MG Oral Tab Take 1 tablet (400 mg total) by mouth every 6 (six) hours as needed for Pain. 30 tablet 0    cyclobenzaprine 10 MG Oral Tab Take 1 tablet (10 mg total) by mouth 3 (three) times daily as needed for Muscle spasms. 30 tablet 0       Past Medical History:    Anemia    Anxiety    Anxiety and depression    Depression    Essential  hypertension    Gestational diabetes (HCC)    diet    Hyperlipidemia    Migraines    Prediabetes    A1c 6.2    ruptured cerebral aneurysm    Sickle cell trait    Traumatic brain injury (HCC)       Past Surgical History:   Procedure Laterality Date    Anesth, section      x 3    Ir head and neck embolization  08/10/2024       Social History     Socioeconomic History    Marital status:    Tobacco Use    Smoking status: Former     Current packs/day: 0.00     Types: Cigarettes     Quit date: 2024     Years since quittin.4     Passive exposure: Yes    Smokeless tobacco: Never    Tobacco comments:     Had a brain aneurysm and stopped   Vaping Use    Vaping status: Never Used   Substance and Sexual Activity    Alcohol use: No    Drug use: Yes     Frequency: 1.0 times per week     Types: Cannabis     Comment: gummies    Sexual activity: Yes     Partners: Male     Birth control/protection: Tubal Ligation   Other Topics Concern    Caffeine Concern No    Exercise No    Seat Belt Yes       Family History   Problem Relation Age of Onset    Diabetes Mother     High Blood Pressure Mother     Other (heart murmur) Mother     Other (sarcoma) Brother     Other (neurofibromatosis) Brother     Depression Brother     Anxiety Brother     Diabetes Brother         type 2       Sweta Hayes MD, FAES, FAAN  Board-Certified in Neurology, Epilepsy, and Clinical Neurophysiology  [facility] [place]         [1]   Allergies  Allergen Reactions    Amoxicillin RASH    Latex HIVES    Banana ITCHING

## 2025-03-10 ENCOUNTER — TELEPHONE (OUTPATIENT)
Dept: PHYSICAL THERAPY | Facility: HOSPITAL | Age: 40
End: 2025-03-10

## 2025-03-13 ENCOUNTER — APPOINTMENT (OUTPATIENT)
Dept: OCCUPATIONAL MEDICINE | Facility: HOSPITAL | Age: 40
End: 2025-03-13
Attending: Other
Payer: MEDICAID

## 2025-03-20 ENCOUNTER — OFFICE VISIT (OUTPATIENT)
Dept: OCCUPATIONAL MEDICINE | Facility: HOSPITAL | Age: 40
End: 2025-03-20
Attending: Other
Payer: MEDICAID

## 2025-03-20 DIAGNOSIS — R26.89 IMBALANCE: ICD-10-CM

## 2025-03-20 DIAGNOSIS — R51.9 NONINTRACTABLE HEADACHE, UNSPECIFIED CHRONICITY PATTERN, UNSPECIFIED HEADACHE TYPE: ICD-10-CM

## 2025-03-20 DIAGNOSIS — I60.7 CEREBRAL ANEURYSM RUPTURE (HCC): Primary | ICD-10-CM

## 2025-03-20 PROCEDURE — 97110 THERAPEUTIC EXERCISES: CPT

## 2025-03-20 PROCEDURE — 97165 OT EVAL LOW COMPLEX 30 MIN: CPT

## 2025-03-20 NOTE — PROGRESS NOTES
OT NEURO EVALUATION:     Patient:  Greg Uriarte (39 year old, female)       Cerebral aneurysm rupture (HCC) (I60.7)  Nonintractable headache, unspecified chronicity pattern, unspecified headache type (R51.9)  Imbalance (R26.89) Referring Provider: Sweta Hayes  Today's Date   3/20/2025    Precautions:  Latex Allergy (metal coil in head.)   Date of Evaluation: 03/20/25  Next MD visit: 5/28/2025  Date of Injury: No data recorded  Date of Surgery: 08/27/2024     PATIENT SUMMARY   Summary of chief complaints: UE weakness and minimal spasticity in Bilateral UE.  History of current condition: Pt had annuerism. it was hard to walk. did some moderate therapy. back is still messed up. activity endurance is not as high as it used to be. If pushed past activity tolerance, she is out for the week.   Pain level: current 5 /10, at best 3 /10, at worst 10 /10  Description of symptoms: Spasticity on the L side of the face and in the hands when faigued as well as UE weakness.   Occupation: Nurse   Occupational Roles: worker   Leisure activities/Hobbies: cleaning, dancing, walking, running. .   Prior level of function: independent with no limiation or difficulties  Current limitations: opening jars, doing hair  Pt goals: wishing to return to \"normal\"  Hand Dominance: right  Living Situation: family    Past medical history was reviewed with Greg.  Significant findings include:    Imaging/Tests: MRA BRAIN (CPT=70544) (12/27/2025)  1. No hemodynamically significant stenosis or occlusion.   2. Stable susceptibility in the region the right PCOM at the site aneurysm coil.   3. Mild irregularity in the left supraclinoid ICA appears stable   Greg  has a past medical history of Anemia, Anxiety, Anxiety and depression, Depression, Essential hypertension, Gestational diabetes (HCC), Hyperlipidemia, Migraines, Prediabetes, ruptured cerebral aneurysm, Sickle cell trait, and Traumatic brain injury ([facility]).  She  has a past surgical history  that includes anesth, section and ir head and neck embolization (08/10/2024).    ASSESSMENT  Greg presents to occupational therapy evaluation with primary c/o UE weakness and minimal spasticity in Bilateral UE.. The results of the objective tests and measures show UE weakness and minimal spasticity in bilateral UE. Functional deficits include but are not limited to independent with no limiation or difficulties. Signs and symptoms are consistent with diagnosis of Cerebral aneurysm rupture (HCC) (I60.7)  Nonintractable headache, unspecified chronicity pattern, unspecified headache type (R51.9)  Imbalance (R26.89). Pt and OT discussed evaluation findings, pathology, POC and HEP.  Pt voiced understanding and performs HEP correctly without reported pain. Skilled Occupational Therapy is medically necessary to address the above impairments and reach functional goals.  OBJECTIVE:    Musculoskeletal  LMP 2024 (Exact Date)   Orthotics: N/A  Edema: N/A     ROM and Strength:  (* denotes performed with pain)     DONOVAN UE ROM: WNL     Hand Strength (lbs) R L      68.4 lbs 59.6 lbs     2 pt Pinch 6 * 7     3 pt Pinch 10 10     Lateral pinch 19 15       Neurological:  Cognition:   Overall Cognitive Status: -- (Patient notes difficulties with cognition, however feels more comfortable completing any cognitive assessments onece she recieves her glasses. The [place] Cognitive Assessment will be completed at the following asppointment on 3/27/2024)        ,   Vision:   Current Vision: -- (Patient is now experiencing significant vision changes including blurriness and limited ability to see far. However patient has seen an optometrist and ris expected to receive her glasses at the end of this week.)       Sensory: Numbness and tingling   [place] completed  L: the patient perceived the 3.61 monofilament but was unable to accurately localize the stimulus. She was able to correctly identify the location of the  subsequent monofilament application.   R: the patient  perceived and accurately localized the 3.61 monofilament  Spasticity: hand and face (L side)    Coordination:   9 Hole Peg Test: R- 19.74  L- 21.01 (difficulties with in hand manipulation)    ADLs/IADLs:  ADL's    Bathing: modified independence     Dressing: modified independence     Feeding: modified independence     Grooming: modified independence  IADL's     Homemaking: modified independence    Other Functional Mobility/ADL Comments:       .evalendToday's Treatment and Response:   Pt education was provided on exam findings, treatment diagnosis, treatment plan, expectations, and prognosis.  Today's Treatment       3/20/2025   OT Treatment   Therapeutic Exercise HEP Review   Therapeutic Exercise Min 10   Eval Min 35   Total Timed Procedures 10   Total Service Procedures 45   Total Time 45       Patient was instructed in and issued a HEP for:   Wrist ROM   Digits ROM   Yellow theraputty      Charges:  OT EVAL  x1, TEx1   Based on analysis of data from a problem-focused assessment from a brief chart review, clinical presentation of physical, cognitive and psychosocial skills, as well as review of patient rated outcome measures, this evaluation involved Low complexity decision making, with 1-3 occupational performance component deficits, no comorbidities, and no need for modification of tasks or assistance with assessment.                                                                PLAN OF CARE:    Goals: (to be met in 12 visits)     Pt will improve in 2 lbs in 2-point pinch strength to increase upper extremity strength, range of motion, and coordination to support daily tasks.    Pt will enhance fine motor skills for improved hand-eye coordination and object manipulation to support self-care tasks, household chores, writing and typing, medication management, leisure activities.  Pt will increase endurance and activity tolerance to 45 minutes with no breaks to  support participation in ADL/IADL.    Pt will be independent and compliant with comprehensive HEP to maintain progress achieved in OT.           Frequency / Duration: Patient will be seen 2 x/week or a total of 12 visits over a 90 day period. Treatment will include: Manual Therapy; Therapeutic Activities; Self-Care Home Management; Therapeutic Exercise; Ultrasound    Education or treatment limitation: None   Rehab Potential: good     QuickDASH Outcome Score  Score: (Patient-Rptd) 34.09 % (3/20/2025  4:18 PM)      Patient/Family/Caregiver was advised of these findings, precautions, and treatment options and has agreed to actively participate in planning and for this course of care.    Thank you for your referral. Please co-sign or sign and return this letter via fax as soon as possible to [phone]. If you have any questions, please contact me at Dept: [phone]    Sincerely,  Electronically signed by therapist: Kasandra Song OT  Physician's certification required: Yes  I certify the need for these services furnished under this plan of treatment and while under my care.    X___________________________________________________ Date____________________    Certification From: 3/20/2025  To:6/18/2025

## 2025-03-26 ENCOUNTER — APPOINTMENT (OUTPATIENT)
Dept: PHYSICAL THERAPY | Facility: HOSPITAL | Age: 40
End: 2025-03-26
Attending: Other
Payer: MEDICAID

## 2025-03-27 ENCOUNTER — OFFICE VISIT (OUTPATIENT)
Dept: OCCUPATIONAL MEDICINE | Facility: HOSPITAL | Age: 40
End: 2025-03-27
Attending: Other
Payer: MEDICAID

## 2025-03-27 PROCEDURE — 97110 THERAPEUTIC EXERCISES: CPT

## 2025-03-27 NOTE — PROGRESS NOTES
Patient: Greg Uriarte (39 year old, female) Referring Provider:  Insurance:   Diagnosis: Cerebral aneurysm rupture (HCC) (I60.7)  Nonintractable headache, unspecified chronicity pattern, unspecified headache type (R51.9)  Imbalance (R26.89) Sweta Hayes  [facility] MEDICAID   Date of Surgery: 08/27/2024 Next MD visit:  N/A   Precautions:  Latex Allergy (cerebral coil) 5/28/2025 Referral Information:   Date of Injury: No data recorded Date of Evaluation: Req: 10, Auth: 10, Exp: 5/19/2025 03/20/25 POC Auth Visits:          Today's Date   3/27/2025    Subjective  Patient presents to OT appointment noting an increase in stomach pain. She has recently been experiencing a loss of appetite since Monday 4/24. She states that she has been snacking thorughout the day. She took ibuprophen prior to attending appointment and describes symptoms as sharp shooting pains.       Pain: 7/10     Objective  The cognitive assessment and edema measurements were taken.        Right Wrist crease: 20.0 cm  Left Wrist crease: 20.5 cm  Right MCP: 19.5 cm  Left MCP: 19 cm    Collin Cognitive Assessment (MoCA):  Score: 19/30   Patient demonstrates difficulties in areas such as:   -Visuospatial/executive function  -Naming  -Attention  -Delayed recall        Assessment  Patient was advised to ensure she taking pain medicatons with food as this may be contributing to stomacch pains. She was also advised to monitor her symptoms and get in contact with her referring provider if symptoms continue or worsen. A new goals were added pertaining to sustained attention and task initiation.    Goals (to be met in 12 visits)   Pt will improve in 2 lbs in 2-point pinch strength to increase upper extremity strength, range of motion, and coordination to support daily tasks.    Pt will enhance fine motor skills for improved hand-eye coordination and object manipulation to support self-care tasks, household chores, writing and typing, medication  management, leisure activities.  Pt will increase endurance and activity tolerance to 45 minutes with no breaks to support participation in ADL/IADL.    Pt will be independent and compliant with comprehensive HEP to maintain progress achieved in OT.    Pt will improve sustained attention by independently completing a familiar task for 30 minutes with no breaks to support independence and success in activities like leisure and social participation.  (NEW GOAL)   Pt will initiate and complete a multi-step task, such as meal preparation or household chores by independently organizing and following a written or digital checklist.  (NEW GOAL)              Plan  Patient will be seen 2 x/week or a total of 12 visits over a 90 day period. Treatment will include: Manual Therapy; Therapeutic Activities; Self-Care Home Management; Therapeutic Exercise; Ultrasound    Treatment Last 4 Visits        3/20/2025 3/27/2025   OT Treatment   Treatment Day  2   Therapeutic Exercise HEP Review    Therapeutic Exercise Min 10 45   Eval Min 35    Total Timed Procedures 10 45   Total Service Procedures 45 45   Total Time 45 45         HEP  Wrist ROM   Digits ROM   Yellow theraputty      Charges     TEx3

## 2025-03-31 ENCOUNTER — TELEPHONE (OUTPATIENT)
Dept: PHYSICAL THERAPY | Facility: HOSPITAL | Age: 40
End: 2025-03-31

## 2025-03-31 ENCOUNTER — APPOINTMENT (OUTPATIENT)
Dept: OCCUPATIONAL MEDICINE | Facility: HOSPITAL | Age: 40
End: 2025-03-31
Attending: Other
Payer: MEDICAID

## 2025-03-31 ENCOUNTER — OFFICE VISIT (OUTPATIENT)
Dept: PHYSICAL THERAPY | Facility: HOSPITAL | Age: 40
End: 2025-03-31
Attending: Other
Payer: MEDICAID

## 2025-03-31 DIAGNOSIS — M54.9 BACK PAIN, UNSPECIFIED BACK LOCATION, UNSPECIFIED BACK PAIN LATERALITY, UNSPECIFIED CHRONICITY: ICD-10-CM

## 2025-03-31 DIAGNOSIS — R26.89 IMBALANCE: Primary | ICD-10-CM

## 2025-03-31 PROCEDURE — 97161 PT EVAL LOW COMPLEX 20 MIN: CPT

## 2025-03-31 PROCEDURE — 97110 THERAPEUTIC EXERCISES: CPT

## 2025-04-02 ENCOUNTER — OFFICE VISIT (OUTPATIENT)
Dept: PHYSICAL THERAPY | Facility: HOSPITAL | Age: 40
End: 2025-04-02
Attending: Other
Payer: MEDICAID

## 2025-04-02 PROCEDURE — 97110 THERAPEUTIC EXERCISES: CPT

## 2025-04-02 PROCEDURE — 97140 MANUAL THERAPY 1/> REGIONS: CPT

## 2025-04-02 NOTE — PROGRESS NOTES
SPINE EVALUATION:     Diagnosis:   Imbalance (R26.89)  Back pain, unspecified back location, unspecified back pain laterality, unspecified chronicity Patient:  Greg Uriarte (39 year old, female)        Referring Provider: Sweta Hayes  Today's Date   4/2/2025    Precautions:  Other (use comment) (coil from brain aneurysm 8/2024)   Date of Evaluation: 03/31/25  Next MD visit: No data recorded  Date of Surgery: No data recorded     PATIENT SUMMARY   Summary of chief complaints: low back pain, feeling off balance  History of current condition: Low back pain started in August 2024 after she underwent aneurysm repair. Denies history of back pain . Pain is constant and felt when walking > 10-15 minutes. Bending forward can also produce pain. Denies numbness/tingling on BLE. Pt reports L leg shooting pain coming from the ankle up to the thigh felt at least once a week. Takes Norco if pain is bad. Pt states balance has been improving. Feels depth perception is off so that she hits the doorway, however she has been working on her balance and states she has made great improvements.   Pain level: current 4 /10, at best 4 /10, at worst 10 /10 (housework, prolonged walking)  Description of symptoms: constant LB pain   Occupation: RN   Prior level of function: Independent, currently independent but needs more time  Current limitations: walking, house chores, bending  Pt goals: get stronger, decrease the pain  Red flag signs/symptoms: Pt denies dizziness, drop attacks, dysphagia, dysarthria, diplopia; Pt denies changes in bowel/bladder function, saddle anesthesia    Past medical history was reviewed with Greg.  Significant findings include: brain aneurysm  Imaging/Tests:     Greg  has a past medical history of Anemia, Anxiety, Anxiety and depression, Depression, Essential hypertension, Gestational diabetes (HCC), Hyperlipidemia, Migraines, Prediabetes, ruptured cerebral aneurysm, Sickle cell trait, and Traumatic brain  injury (HCC).  She  has a past surgical history that includes anesth, section and ir head and neck embolization (08/10/2024).    ASSESSMENT  Greg presents to physical therapy evaluation with primary c/o low back pain, feeling off balance. The results of the objective tests and measures show marked tenderness of paraspinals, significant tightness of hamstrings, limited trunk motion and strength. Able to perform all balance challenges without LOB although she still feels off balance. Pt is very motivated.. Functional deficits include but are not limited to walking, house chores, bending. Signs and symptoms are consistent with diagnosis of Imbalance (R26.89)  Back pain, unspecified back location, unspecified back pain laterality, unspecified chronicity. Pt and PT discussed evaluation findings, pathology, POC and HEP.  Pt voiced understanding and performs HEP correctly without reported pain. Skilled Physical Therapy is medically necessary to address the above impairments and reach functional goals.    OBJECTIVE:    Musculoskeletal:  Observation/Posture: anterior pelvic tilt (exaggerated lumbar lordosis, B shoulders slightly hiked up)      Palpation: marked tenderness of lumbar and thoracic paraspinals, marked tightness of R and L hmastrings     Special tests:   (+) tightness of hamstrings B(-) Slump test     ROM and Strength:  (* denotes performed with pain)  Trunk ROM MMT (-/5)     Flex 45 -- (grossly graded 3- in all planes)     Ext 18      R L R L     Side bend 15 12         Rotation 75% 75%       ,   Hip   ROM MMT (-/5)    R L R L     Flex (L2) -- (BLE WFL, strength at 4/5, able to heel/toe walk with stretch felt on lower legs)                     Flexibility: R and L hamstrings flexibility 50/90  LE Flexibility R L     Hip Flexor         Hamstrings significantly restricted significantly restricted     ITB         Piriformis         Quads         Gastroc-soleus mod restricted mod restricted        Neurological:  Sensation:     Intact to touch but feels LLE shooting pain from ankle to thigh occasionally       Balance and Functional Mobility:  Gait: pt ambulates on level ground with -- (able to do head turns wihtout LOB, able to step over and around, able to turn qithout LOB observed).  Balance: SLS: EO R 20 sec, EO L 16 sec          Today's Treatment and Response:   Pt education was provided on exam findings, treatment diagnosis, treatment plan, expectations, and prognosis.  Today's Treatment       3/31/2025   Spine Treatment   Therapeutic Exercise Minutes 10   Evaluation Minutes 35   Total Time Of Timed Procedures 10   Total Time Of Service-Based Procedures 35   Total Treatment Time 45   HEP Access Code: [email or web address]  URL: [email or web address]  Date: 04/02/2025  Prepared by: Fouzia Solares    Exercises  - Supine Posterior Pelvic Tilt  - 2 x daily - 7 x weekly - 1 sets - 10 reps  - Hooklying Gluteal Sets  - 2 x daily - 7 x weekly - 1 sets - 10 reps  - Standing Hamstring Stretch with Step  - 2 x daily - 7 x weekly - 1 sets - 3 reps - 15 hold, hold onto railing, do gently and as tolerated        Patient was instructed in and issued a HEP for: Access Code: [email or web address]  URL: [email or web address]  Date: 04/02/2025  Prepared by: Fouzia Solares    Exercises  - Supine Posterior Pelvic Tilt  - 2 x daily - 7 x weekly - 1 sets - 10 reps  - Hooklying Gluteal Sets  - 2 x daily - 7 x weekly - 1 sets - 10 reps  - Standing Hamstring Stretch with Step  - 2 x daily - 7 x weekly - 1 sets - 3 reps - 15 hold, hold onto railing, do gently and as tolerated    Charges:  PT EVAL: Low Complexity, EVAL1 TE11  In agreement with evaluation findings and clinical rationale, this evaluation involved LOW COMPLEXITY decision making due to no personal factors/comorbidities, 1-2 body structures involved/activity limitations, and stable symptoms as documented in the evaluation.                                                                          PLAN OF CARE:    Goals: (to be met in 12 visits)    Not Met Progress Toward Partially Met Met   Pt will improve transversus abdominis recruitment to perform proper isometric contraction without requiring verbal or tactile cuing to promote advancement of therex. [] [] [] []   Pt will demonstrate good understanding of proper posture and body mechanics to decrease pain and improve spinal safety. [] [] [] []   Pt will improve lumbar spine AROM flexion to >70 deg to allow increase ease with bending forward to don shoes. [] [] [] []   Pt will demonstrate increased R and L hamstrings flexibility to minimize compressive like forces [] [] [] []   Pt will have decreased paraspinal mm tension to tolerate standing >20 minutes for work and home activities. [] [] [] []   Pt will demonstrate improved core strength to be able to perform light to moderate lifting of object  with <3/10 pain. [] [] [] []   Pt will be independent and compliant with comprehensive HEP to maintain progress achieved in PT [] [] [] []         Frequency / Duration: Patient will be seen 2x/week or a total of 12  visits over a 90 day period. Treatment will include: Manual Therapy; Therapeutic Exercise; Home Exercise Program instruction; Neuromuscular Re-education    Education or treatment limitation: None   Rehab Potential: good     QuickDASH Outcome Score  Score: (Patient-Rptd) 34.09 % (3/20/2025  4:18 PM)       Patient/Family/Caregiver was advised of these findings, precautions, and treatment options and has agreed to actively participate in planning and for this course of care.    Thank you for your referral. Please co-sign or sign and return this letter via fax as soon as possible to [phone]. If you have any questions, please contact me at Dept: [phone]    Sincerely,  Electronically signed by therapist: Fouzia Solares, PT  Physician's certification required: Yes  I certify the need for these services  furnished under this plan of treatment and while under my care.    X___________________________________________________ Date____________________    Certification From: 4/2/2025  To:7/1/2025

## 2025-04-03 ENCOUNTER — APPOINTMENT (OUTPATIENT)
Dept: OCCUPATIONAL MEDICINE | Facility: HOSPITAL | Age: 40
End: 2025-04-03
Attending: Other
Payer: MEDICAID

## 2025-04-03 NOTE — PROGRESS NOTES
Patient: Greg Uriarte (39 year old, female) Referring Provider:  Insurance:   Diagnosis: Imbalance (R26.89)  Back pain, unspecified back location, unspecified back pain laterality, unspecified chronicity Sweta LI Bhatt [employer]   Date of Surgery: No data recorded Next MD visit:  N/A   Precautions:  Other (use comment) (coil from brain aneurysm 8/2024) No data recorded Referral Information:    Date of Evaluation: Req: 8, Auth: 8, Exp: 5/30/2025 03/31/25 POC Auth Visits:          Today's Date   4/2/2025    Subjective  Exercises helped with flexibility. Postural correction also decreased her pain       Pain: 5/10     Objective  Ambulated into clinic with steady gait, no device. She needed verbal and tactile cues to avoid anterior pelvic tilt in sitting and standing , was able to find a more neutral pelvic position towards end of treatment.Use of wedge provided good LB relief and pt is interested in acquiring one. Educated pt that this is only for positioning, not for sleeping and she verbalized understanding.         Assessment  Pt able to demonstrate understanding of good posture with decreased tendency to arch her back and activate her core. Continue on posture education and core strengthening/activation. She reported a decrease in pain level at end of visit.    Goals (to be met in 12 visits)    Not Met Progress Toward Partially Met Met   Pt will improve transversus abdominis recruitment to perform proper isometric contraction without requiring verbal or tactile cuing to promote advancement of therex. [] [] [] []   Pt will demonstrate good understanding of proper posture and body mechanics to decrease pain and improve spinal safety. [] [] [] []   Pt will improve lumbar spine AROM flexion to >70 deg to allow increase ease with bending forward to don shoes. [] [] [] []   Pt will demonstrate increased R and L hamstrings flexibility to minimize compressive like forces [] [] [] []   Pt will have decreased  paraspinal mm tension to tolerate standing >20 minutes for work and home activities. [] [] [] []   Pt will demonstrate improved core strength to be able to perform light to moderate lifting of object  with <3/10 pain. [] [] [] []   Pt will be independent and compliant with comprehensive HEP to maintain progress achieved in PT [] [] [] []             Plan  Continue PT per poc    Treatment Last 4 Visits  Treatment Day: 2       3/31/2025 4/2/2025   Spine Treatment   Therapeutic Exercise  Nu step level 3 x 5 minutes  Hamstrings stretch with strap  Lying on wedge: pelvic tilt SKTC stretches  On Liberian ball : assisted DKTC  and partial trunk rotation  Seated trunk stretches with Swiss ball into flexion and diagonals  Forward lunges on Bosu  Heel raises, partial squats  In staggered stance: resisted UE movements multi direction for balance and core activation   Manual Therapy  STM to LB area in prone Gr 2 PA mobs to lumbar spine   Modalities  CP to LB at end of visit   Therapeutic Exercise Minutes 10 30   Manual Therapy Minutes  10   Evaluation Minutes 35    Hot/Cold Pack Minutes  5   Total Time Of Timed Procedures 10 40   Total Time Of Service-Based Procedures 35 5   Total Treatment Time 45 45   HEP Access Code: [email or web address]  URL: [email or web address]  Date: 04/02/2025  Prepared by: Fouzia Solares    Exercises  - Supine Posterior Pelvic Tilt  - 2 x daily - 7 x weekly - 1 sets - 10 reps  - Hooklying Gluteal Sets  - 2 x daily - 7 x weekly - 1 sets - 10 reps  - Standing Hamstring Stretch with Step  - 2 x daily - 7 x weekly - 1 sets - 3 reps - 15 hold, hold onto railing, do gently and as tolerated Access Code: [email or web address]  URL: [email or web address]  Date: 04/03/2025  Prepared by: Fouzia Solares    Exercises  - Hooklying Hamstring Stretch with Strap  - 1 x daily - 7 x weekly - 1 sets - 3 reps - 15 hold  - Heel Raises with Counter Support  - 1 x daily - 7 x weekly - 1 sets - 10 reps  - Mini  Squat with Counter Support  - 1 x daily - 7 x weekly - 1 sets - 10 reps        HEP  Access Code: [email or web address]  URL: [email or web address]  Date: 04/03/2025  Prepared by: Fouzia Solares    Exercises  - Hooklying Hamstring Stretch with Strap  - 1 x daily - 7 x weekly - 1 sets - 3 reps - 15 hold  - Heel Raises with Counter Support  - 1 x daily - 7 x weekly - 1 sets - 10 reps  - Mini Squat with Counter Support  - 1 x daily - 7 x weekly - 1 sets - 10 reps    Charges  1MT 2TE

## 2025-04-07 ENCOUNTER — TELEPHONE (OUTPATIENT)
Dept: OCCUPATIONAL MEDICINE | Facility: HOSPITAL | Age: 40
End: 2025-04-07

## 2025-04-07 NOTE — TELEPHONE ENCOUNTER
Spoke to patient about today's missed appointment. She thought appointment was scheduled for 6:30. PT appointment time on Wednesday and OT appointment time on Thursday were confirmed with patient.

## 2025-04-09 ENCOUNTER — APPOINTMENT (OUTPATIENT)
Dept: PHYSICAL THERAPY | Facility: HOSPITAL | Age: 40
End: 2025-04-09
Attending: Other
Payer: MEDICAID

## 2025-04-10 ENCOUNTER — TELEPHONE (OUTPATIENT)
Dept: OCCUPATIONAL MEDICINE | Facility: HOSPITAL | Age: 40
End: 2025-04-10

## 2025-04-10 NOTE — TELEPHONE ENCOUNTER
Spoke with patient about today's appointment. She states she has a stomach virus and will call in if she is not feeling better by her next appointment on monday.

## 2025-04-14 ENCOUNTER — TELEPHONE (OUTPATIENT)
Dept: PHYSICAL THERAPY | Facility: HOSPITAL | Age: 40
End: 2025-04-14

## 2025-04-14 ENCOUNTER — TELEPHONE (OUTPATIENT)
Dept: OCCUPATIONAL MEDICINE | Facility: HOSPITAL | Age: 40
End: 2025-04-14

## 2025-04-14 NOTE — TELEPHONE ENCOUNTER
Called patient regarding today's missed appointment. Unable to leave message due to full mailbox.

## 2025-04-16 ENCOUNTER — APPOINTMENT (OUTPATIENT)
Dept: PHYSICAL THERAPY | Facility: HOSPITAL | Age: 40
End: 2025-04-16
Attending: Other
Payer: MEDICAID

## 2025-04-16 ENCOUNTER — TELEPHONE (OUTPATIENT)
Dept: PHYSICAL THERAPY | Facility: HOSPITAL | Age: 40
End: 2025-04-16

## 2025-04-17 ENCOUNTER — TELEPHONE (OUTPATIENT)
Dept: OCCUPATIONAL MEDICINE | Facility: HOSPITAL | Age: 40
End: 2025-04-17

## 2025-04-17 NOTE — TELEPHONE ENCOUNTER
Called patient about today's missed appointment, but pt did not answer and I was unable to leave a message

## 2025-04-21 ENCOUNTER — APPOINTMENT (OUTPATIENT)
Dept: OCCUPATIONAL MEDICINE | Facility: HOSPITAL | Age: 40
End: 2025-04-21
Attending: Other
Payer: MEDICAID

## 2025-04-21 ENCOUNTER — APPOINTMENT (OUTPATIENT)
Dept: PHYSICAL THERAPY | Facility: HOSPITAL | Age: 40
End: 2025-04-21
Attending: Other
Payer: MEDICAID

## 2025-04-23 ENCOUNTER — APPOINTMENT (OUTPATIENT)
Dept: PHYSICAL THERAPY | Facility: HOSPITAL | Age: 40
End: 2025-04-23
Attending: Other
Payer: MEDICAID

## 2025-04-24 ENCOUNTER — APPOINTMENT (OUTPATIENT)
Dept: OCCUPATIONAL MEDICINE | Facility: HOSPITAL | Age: 40
End: 2025-04-24
Attending: Other
Payer: MEDICAID

## 2025-04-28 ENCOUNTER — APPOINTMENT (OUTPATIENT)
Dept: PHYSICAL THERAPY | Facility: HOSPITAL | Age: 40
End: 2025-04-28
Attending: Other
Payer: MEDICAID

## 2025-04-28 ENCOUNTER — APPOINTMENT (OUTPATIENT)
Dept: OCCUPATIONAL MEDICINE | Facility: HOSPITAL | Age: 40
End: 2025-04-28
Attending: Other
Payer: MEDICAID

## 2025-05-05 ENCOUNTER — APPOINTMENT (OUTPATIENT)
Dept: PHYSICAL THERAPY | Facility: HOSPITAL | Age: 40
End: 2025-05-05
Attending: Other
Payer: MEDICAID

## 2025-05-05 ENCOUNTER — APPOINTMENT (OUTPATIENT)
Dept: OCCUPATIONAL MEDICINE | Facility: HOSPITAL | Age: 40
End: 2025-05-05
Attending: Other
Payer: MEDICAID

## 2025-05-20 ENCOUNTER — HOSPITAL ENCOUNTER (EMERGENCY)
Facility: HOSPITAL | Age: 40
Discharge: HOME | End: 2025-05-20
Attending: EMERGENCY MEDICINE
Payer: MEDICAID

## 2025-05-20 ENCOUNTER — APPOINTMENT (OUTPATIENT)
Dept: CT IMAGING | Facility: HOSPITAL | Age: 40
End: 2025-05-20
Attending: EMERGENCY MEDICINE
Payer: MEDICAID

## 2025-05-20 ENCOUNTER — APPOINTMENT (OUTPATIENT)
Dept: ULTRASOUND IMAGING | Facility: HOSPITAL | Age: 40
End: 2025-05-20
Attending: EMERGENCY MEDICINE
Payer: MEDICAID

## 2025-05-20 VITALS
HEIGHT: 65 IN | BODY MASS INDEX: 39.99 KG/M2 | SYSTOLIC BLOOD PRESSURE: 133 MMHG | DIASTOLIC BLOOD PRESSURE: 81 MMHG | WEIGHT: 240 LBS | RESPIRATION RATE: 14 BRPM | HEART RATE: 86 BPM | TEMPERATURE: 97 F | OXYGEN SATURATION: 100 %

## 2025-05-20 DIAGNOSIS — K80.20 GALLSTONES: Primary | ICD-10-CM

## 2025-05-20 DIAGNOSIS — L72.3 SEBACEOUS CYST: ICD-10-CM

## 2025-05-20 LAB
ALBUMIN SERPL-MCNC: 4.8 G/DL (ref 3.2–4.8)
ALBUMIN/GLOB SERPL: 1.6 {RATIO} (ref 1–2)
ALP LIVER SERPL-CCNC: 105 U/L (ref 37–98)
ALT SERPL-CCNC: 9 U/L (ref 10–49)
ANION GAP SERPL CALC-SCNC: 6 MMOL/L (ref 0–18)
AST SERPL-CCNC: 11 U/L (ref ?–34)
B-HCG UR QL: NEGATIVE
BASOPHILS # BLD AUTO: 0.01 X10(3) UL (ref 0–0.2)
BASOPHILS NFR BLD AUTO: 0.1 %
BILIRUB SERPL-MCNC: 0.3 MG/DL (ref 0.3–1.2)
BILIRUB UR QL STRIP.AUTO: NEGATIVE
BUN BLD-MCNC: 8 MG/DL (ref 9–23)
CALCIUM BLD-MCNC: 9.8 MG/DL (ref 8.7–10.6)
CHLORIDE SERPL-SCNC: 106 MMOL/L (ref 98–112)
CLARITY UR REFRACT.AUTO: CLEAR
CO2 SERPL-SCNC: 27 MMOL/L (ref 21–32)
CREAT BLD-MCNC: 0.65 MG/DL (ref 0.55–1.02)
EGFRCR SERPLBLD CKD-EPI 2021: 116 ML/MIN/1.73M2 (ref 60–?)
EOSINOPHIL # BLD AUTO: 0.16 X10(3) UL (ref 0–0.7)
EOSINOPHIL NFR BLD AUTO: 1.9 %
ERYTHROCYTE [DISTWIDTH] IN BLOOD BY AUTOMATED COUNT: 18.2 %
GLOBULIN PLAS-MCNC: 3 G/DL (ref 2–3.5)
GLUCOSE BLD-MCNC: 118 MG/DL (ref 70–99)
GLUCOSE UR STRIP.AUTO-MCNC: NORMAL MG/DL
HCT VFR BLD AUTO: 35.6 % (ref 35–48)
HGB BLD-MCNC: 10.8 G/DL (ref 12–16)
IMM GRANULOCYTES # BLD AUTO: 0.06 X10(3) UL (ref 0–1)
IMM GRANULOCYTES NFR BLD: 0.7 %
KETONES UR STRIP.AUTO-MCNC: NEGATIVE MG/DL
LEUKOCYTE ESTERASE UR QL STRIP.AUTO: NEGATIVE
LIPASE SERPL-CCNC: 31 U/L (ref 12–53)
LYMPHOCYTES # BLD AUTO: 2.07 X10(3) UL (ref 1–4)
LYMPHOCYTES NFR BLD AUTO: 24.1 %
MCH RBC QN AUTO: 22.3 PG (ref 26–34)
MCHC RBC AUTO-ENTMCNC: 30.3 G/DL (ref 31–37)
MCV RBC AUTO: 73.4 FL (ref 80–100)
MONOCYTES # BLD AUTO: 0.47 X10(3) UL (ref 0.1–1)
MONOCYTES NFR BLD AUTO: 5.5 %
NEUTROPHILS # BLD AUTO: 5.82 X10 (3) UL (ref 1.5–7.7)
NEUTROPHILS # BLD AUTO: 5.82 X10(3) UL (ref 1.5–7.7)
NEUTROPHILS NFR BLD AUTO: 67.7 %
NITRITE UR QL STRIP.AUTO: NEGATIVE
OSMOLALITY SERPL CALC.SUM OF ELEC: 287 MOSM/KG (ref 275–295)
PH UR STRIP.AUTO: 5.5 [PH] (ref 5–8)
PLATELET # BLD AUTO: 324 10(3)UL (ref 150–450)
POTASSIUM SERPL-SCNC: 3.7 MMOL/L (ref 3.5–5.1)
PROT SERPL-MCNC: 7.8 G/DL (ref 5.7–8.2)
PROT UR STRIP.AUTO-MCNC: NEGATIVE MG/DL
RBC # BLD AUTO: 4.85 X10(6)UL (ref 3.8–5.3)
SODIUM SERPL-SCNC: 139 MMOL/L (ref 136–145)
SP GR UR STRIP.AUTO: 1.02 (ref 1–1.03)
UROBILINOGEN UR STRIP.AUTO-MCNC: NORMAL MG/DL
WBC # BLD AUTO: 8.6 X10(3) UL (ref 4–11)

## 2025-05-20 PROCEDURE — 85025 COMPLETE CBC W/AUTO DIFF WBC: CPT | Performed by: EMERGENCY MEDICINE

## 2025-05-20 PROCEDURE — 96375 TX/PRO/DX INJ NEW DRUG ADDON: CPT

## 2025-05-20 PROCEDURE — 96374 THER/PROPH/DIAG INJ IV PUSH: CPT

## 2025-05-20 PROCEDURE — 76700 US EXAM ABDOM COMPLETE: CPT | Performed by: EMERGENCY MEDICINE

## 2025-05-20 PROCEDURE — 99285 EMERGENCY DEPT VISIT HI MDM: CPT

## 2025-05-20 PROCEDURE — 96372 THER/PROPH/DIAG INJ SC/IM: CPT

## 2025-05-20 PROCEDURE — 81001 URINALYSIS AUTO W/SCOPE: CPT | Performed by: EMERGENCY MEDICINE

## 2025-05-20 PROCEDURE — 74177 CT ABD & PELVIS W/CONTRAST: CPT | Performed by: EMERGENCY MEDICINE

## 2025-05-20 PROCEDURE — 81025 URINE PREGNANCY TEST: CPT

## 2025-05-20 PROCEDURE — 80053 COMPREHEN METABOLIC PANEL: CPT

## 2025-05-20 PROCEDURE — 83690 ASSAY OF LIPASE: CPT | Performed by: EMERGENCY MEDICINE

## 2025-05-20 PROCEDURE — 83690 ASSAY OF LIPASE: CPT

## 2025-05-20 PROCEDURE — 80053 COMPREHEN METABOLIC PANEL: CPT | Performed by: EMERGENCY MEDICINE

## 2025-05-20 PROCEDURE — 81001 URINALYSIS AUTO W/SCOPE: CPT

## 2025-05-20 PROCEDURE — 85025 COMPLETE CBC W/AUTO DIFF WBC: CPT

## 2025-05-20 RX ORDER — ONDANSETRON 2 MG/ML
4 INJECTION INTRAMUSCULAR; INTRAVENOUS ONCE
Status: COMPLETED | OUTPATIENT
Start: 2025-05-20 | End: 2025-05-20

## 2025-05-20 RX ORDER — DICYCLOMINE HCL 20 MG
20 TABLET ORAL 3 TIMES DAILY PRN
Qty: 30 TABLET | Refills: 0 | Status: SHIPPED | OUTPATIENT
Start: 2025-05-20 | End: 2025-06-19

## 2025-05-20 RX ORDER — DICYCLOMINE HYDROCHLORIDE 10 MG/ML
10 INJECTION INTRAMUSCULAR ONCE
Status: COMPLETED | OUTPATIENT
Start: 2025-05-20 | End: 2025-05-20

## 2025-05-20 RX ORDER — MORPHINE SULFATE 4 MG/ML
4 INJECTION, SOLUTION INTRAMUSCULAR; INTRAVENOUS ONCE
Status: COMPLETED | OUTPATIENT
Start: 2025-05-20 | End: 2025-05-20

## 2025-05-20 RX ORDER — FAMOTIDINE 10 MG/ML
20 INJECTION, SOLUTION INTRAVENOUS ONCE
Status: COMPLETED | OUTPATIENT
Start: 2025-05-20 | End: 2025-05-20

## 2025-05-20 RX ORDER — FAMOTIDINE 20 MG/1
20 TABLET, FILM COATED ORAL 2 TIMES DAILY PRN
Qty: 30 TABLET | Refills: 0 | Status: SHIPPED | OUTPATIENT
Start: 2025-05-20 | End: 2025-06-03

## 2025-05-20 NOTE — ED INITIAL ASSESSMENT (HPI)
Patient reports abdominal pain radiating to her back since 10 am. + nausea. Denies vomiting, diarrhea, fever, dysuria,

## 2025-05-20 NOTE — DISCHARGE INSTRUCTIONS
You were seen in the emergency department for abdominal pain.  You were found to have gallstones.  Your CT also showed a sebaceous cyst.     You can take Tylenol as needed for pain.  You can also trial Pepcid daily.  Avoid spicy foods.  Take Bentyl as needed 3 times daily for severe crampy pain.  Avoid fatty and spicy foods.  Try to stay hydrated with plenty of fluids.  Apply warm compresses to your abdomen.      Return to the ER if you develop fevers, severe abdominal pain, vomiting and you are unable to keep anything down, or the new concerns or worsening symptoms.  Please follow-up closely with your primary care physician for reevaluation.

## 2025-05-20 NOTE — ED PROVIDER NOTES
Patient Seen in: [facility] Emergency Department        History  Chief Complaint   Patient presents with    Abdomen/Flank Pain     Stated Complaint: abdominal pain    Subjective:   HPI          Patient is a 40-year-old female with a history of hypertension, gestational diabetes, hyperlipidemia, ruptured cerebral aneurysm who presents to the ED for evaluation of periumbilical and epigastric abdominal pain starting today.  Patient reports pain is crampy and also sharp, constant.  Worse with eating.  She also has pain in her mid thoracic region.  She denies any alleviating factors.  Reports some nausea but no vomiting.  No fevers, chest pain, shortness of breath, diarrhea.  No vaginal bleeding or urinary symptoms.  Patient states that she has a history of endometriosis.    Objective:     Past Medical History:    Anemia    Anxiety    Anxiety and depression    Depression    Essential hypertension    Gestational diabetes (HCC)    diet    Hyperlipidemia    Migraines    Prediabetes    A1c 6.2    ruptured cerebral aneurysm    Sickle cell trait    Traumatic brain injury (HCC)              Past Surgical History:   Procedure Laterality Date    Anesth, section      x 3    Ir head and neck embolization  08/10/2024                Social History     Socioeconomic History    Marital status:    Tobacco Use    Smoking status: Former     Current packs/day: 0.00     Types: Cigarettes     Quit date: 2024     Years since quittin.7     Passive exposure: Yes    Smokeless tobacco: Never    Tobacco comments:     Had a brain aneurysm and stopped   Vaping Use    Vaping status: Never Used   Substance and Sexual Activity    Alcohol use: No    Drug use: Yes     Frequency: 1.0 times per week     Types: Cannabis     Comment: gummies    Sexual activity: Yes     Partners: Male     Birth control/protection: Tubal Ligation   Other Topics Concern    Caffeine Concern No    Exercise No    Seat Belt Yes   Social History  Narrative    Diet:  Healthy diet    Caffeine: 4 -5 coffee and tea    Exercise: occas     Social Drivers of Health     Food Insecurity: Unknown (8/10/2024)    Food Insecurity     Food Insecurity: Patient unable to answer   Transportation Needs: No Transportation Needs (8/26/2024)    Transportation Needs     Lack of Transportation: No   Housing Stability: Unknown (8/10/2024)    Housing Stability     Housing Instability: Patient unable to answer                                Physical Exam    ED Triage Vitals [05/20/25 1338]   /73   Pulse 96   Resp 20   Temp 96.7 °F (35.9 °C)   Temp src Temporal   SpO2 100 %   O2 Device None (Room air)       Current Vitals:   Vital Signs  BP: 133/81  Pulse: 86  Resp: 14  Temp: 96.7 °F (35.9 °C)  Temp src: Temporal  MAP (mmHg): 95    Oxygen Therapy  SpO2: 100 %  O2 Device: None (Room air)            Physical Exam  Vitals and nursing note reviewed.   Constitutional:       General: She is not in acute distress.     Appearance: She is not ill-appearing.   HENT:      Head: Normocephalic and atraumatic.      Mouth/Throat:      Mouth: Mucous membranes are moist.   Eyes:      Extraocular Movements: Extraocular movements intact.      Pupils: Pupils are equal, round, and reactive to light.   Cardiovascular:      Rate and Rhythm: Normal rate and regular rhythm.   Pulmonary:      Effort: Pulmonary effort is normal.   Abdominal:      General: There is no distension.      Palpations: Abdomen is soft.      Tenderness: There is no abdominal tenderness.   Musculoskeletal:      Cervical back: Neck supple.      Right lower leg: No edema.      Left lower leg: No edema.   Skin:     General: Skin is warm and dry.      Capillary Refill: Capillary refill takes less than 2 seconds.   Neurological:      General: No focal deficit present.      Mental Status: She is alert.   Psychiatric:         Mood and Affect: Mood normal.                 ED Course  Labs Reviewed   URINALYSIS, ROUTINE - Abnormal; Notable  for the following components:       Result Value    Blood Urine Trace (*)     RBC Urine 3-5 (*)     Squamous Epi. Cells Few (*)     All other components within normal limits   CBC WITH DIFFERENTIAL WITH PLATELET - Abnormal; Notable for the following components:    HGB 10.8 (*)     MCV 73.4 (*)     MCH 22.3 (*)     MCHC 30.3 (*)     All other components within normal limits   COMP METABOLIC PANEL (14) - Abnormal; Notable for the following components:    Glucose 118 (*)     BUN 8 (*)     ALT 9 (*)     Alkaline Phosphatase 105 (*)     All other components within normal limits   LIPASE - Normal   POCT PREGNANCY URINE - Normal   RAINBOW DRAW LAVENDER   RAINBOW DRAW LIGHT GREEN                    MDM     Patient is a 40-year-old female with a history of hypertension, gestational diabetes, hyperlipidemia, ruptured cerebral aneurysm who presents to the ED for evaluation of periumbilical and epigastric abdominal pain starting today.  Patient is afebrile, hemodynamically stable and satting well on room air.  On patient is well-appearing.  She has periumbilical and epigastric tenderness palpation.  No peritoneal signs. Differential diagnosis includes but not limited to pancreatitis, gastritis, GERD, PUD, biliary colic, cholecystitis, appendicitis. Labs from triage show no leukocytosis, hemoglobin 10.8 which is above patient's baseline.  CMP unremarkable.  Lipase normal.  UA negative for UTI.  Urine pregnancy test was negative.  Plan for CT abd/pelvis. Will give IV fluids, Pepcid.     ED Course as of 05/20/25 7586  ------------------------------------------------------------  Time: 05/20 6592  Comment: Patient reevaluated and still complaining of persistent epigastric crampy spasms in addition to right sided back pain.  CT abdomen pelvis shows possible inflamed left-sided sebaceous cyst.  On exam patient has no fluctuance, mass, overlying erythema, warmth or fluctuance in this region.  In addition patient's pain is in her  epigastric region so low suspicion that this is causing her abdominal pain. Discussed that pt will need f/u with PCP for this and advised warm compresses.     Will give IM Bentyl  and obtain right upper quadrant ultrasound to evaluate gallbladder.       ------------------------------------------------------------  Time: 05/20 2158  Comment: Patient reevaluated and tolerated p.o.  Right upper quadrant ultrasound independently reviewed which shows gallstones but no evidence of cholecystitis.  Plan for DC home with general surgery follow-up.  Will prescribe course of Bentyl as needed for spasms and Pepcid.  Pt declines any norco. I discussed return precautions and supportive care measures.  Recommended avoiding spicy and fatty foods.  Advise follow-up with PCP as well.  Patient verbalized understanding and agreement with plan.             Medical Decision Making  Amount and/or Complexity of Data Reviewed  Labs: ordered. Decision-making details documented in ED Course.  Radiology: ordered and independent interpretation performed. Decision-making details documented in ED Course.    Risk  Prescription drug management.        Disposition and Plan     Clinical Impression:  1. Gallstones    2. Sebaceous cyst         Disposition:  Discharge  5/20/2025  9:58 pm    Follow-up:  Anny Preston  [id number]  [facility] [address] [phone]    Schedule an appointment as soon as possible for a visit in 1 day(s)      Tavon Ramos MD  [address] [facility] [id number] [phone]    Schedule an appointment as soon as possible for a visit            Medications Prescribed:  Discharge Medication List as of 5/20/2025 10:15 PM        START taking these medications    Details   famotidine (PEPCID) 20 MG Oral Tab Take 1 tablet (20 mg total) by mouth 2 (two) times daily as needed for Heartburn., Normal, Disp-30 tablet, R-0      dicyclomine 20 MG Oral Tab Take 1 tablet (20 mg total) by mouth 3 (three) times daily as  needed., Normal, Disp-30 tablet, R-0                   Supplementary Documentation:

## 2025-07-21 ENCOUNTER — TELEPHONE (OUTPATIENT)
Dept: NEUROLOGY | Facility: CLINIC | Age: 40
End: 2025-07-21

## 2025-07-21 NOTE — TELEPHONE ENCOUNTER
Patient is scheduled for a surgery and is questioning how long she should be under anesthesia.    Please contact and advise.

## 2025-08-27 ENCOUNTER — OFFICE VISIT (OUTPATIENT)
Facility: CLINIC | Age: 40
End: 2025-08-27

## 2025-08-27 ENCOUNTER — TELEPHONE (OUTPATIENT)
Dept: SURGERY | Facility: CLINIC | Age: 40
End: 2025-08-27

## 2025-08-27 VITALS — RESPIRATION RATE: 16 BRPM | HEART RATE: 80 BPM | SYSTOLIC BLOOD PRESSURE: 126 MMHG | DIASTOLIC BLOOD PRESSURE: 84 MMHG

## 2025-08-27 DIAGNOSIS — I60.7 CEREBRAL ANEURYSM RUPTURE (HCC): Primary | ICD-10-CM

## 2025-08-27 DIAGNOSIS — I67.1 CEREBRAL ANEURYSM (HCC): Primary | ICD-10-CM

## 2025-08-27 DIAGNOSIS — R26.89 IMBALANCE: ICD-10-CM

## 2025-08-27 DIAGNOSIS — M54.9 BACK PAIN, UNSPECIFIED BACK LOCATION, UNSPECIFIED BACK PAIN LATERALITY, UNSPECIFIED CHRONICITY: ICD-10-CM

## 2025-08-27 DIAGNOSIS — M54.2 NECK PAIN: ICD-10-CM

## 2025-08-27 PROCEDURE — 99214 OFFICE O/P EST MOD 30 MIN: CPT | Performed by: OTHER

## 2025-08-27 RX ORDER — FOLIC ACID 1 MG/1
1 TABLET ORAL WEEKLY
COMMUNITY

## 2025-08-27 RX ORDER — ROSUVASTATIN CALCIUM 20 MG/1
20 TABLET, COATED ORAL DAILY
COMMUNITY
Start: 2025-05-23 | End: 2026-05-23

## 2025-08-27 RX ORDER — FAMOTIDINE 20 MG/1
20 TABLET, FILM COATED ORAL AS NEEDED
COMMUNITY
Start: 2025-05-20

## 2025-08-27 RX ORDER — EZETIMIBE 10 MG/1
10 TABLET ORAL DAILY
COMMUNITY
Start: 2025-06-27

## 2025-08-27 RX ORDER — FERROUS SULFATE 325(65) MG
325 TABLET, DELAYED RELEASE (ENTERIC COATED) ORAL
COMMUNITY
Start: 2025-03-25

## 2025-08-27 RX ORDER — DICYCLOMINE HCL 20 MG
TABLET ORAL AS NEEDED
COMMUNITY
Start: 2025-05-21

## 2025-08-27 RX ORDER — PHENTERMINE HYDROCHLORIDE 37.5 MG/1
37.5 TABLET ORAL
COMMUNITY
Start: 2025-08-21 | End: 2025-09-20

## 2025-08-27 RX ORDER — MEDROXYPROGESTERONE ACETATE 10 MG
10 TABLET ORAL DAILY
COMMUNITY
Start: 2025-04-04 | End: 2026-04-04

## 2025-08-27 RX ORDER — LISINOPRIL 10 MG/1
10 TABLET ORAL DAILY
COMMUNITY
Start: 2025-05-23 | End: 2026-05-23

## (undated) NOTE — LETTER
[address]  [address]  Consent for Procedure/Sedation  Date: 8/10/24         Time: 1415    I hereby authorize Dr. Collins, my physician and his/her assistants (if applicable), which may include medical students, residents, and/or fellows, to perform the following surgical operation/ procedure and administer such anesthesia as may be determined necessary by my physician: Diagnostic cerebral angiogram, possible balloon assist, possible stent, possible coiling, possible flow diverter, possible web device, and possible closure device on Greg Uriarte  2.   I recognize that during the surgical operation/procedure, unforeseen conditions may necessitate additional or different procedures than those listed above.  I, therefore, further authorize and request that the above-named surgeon, assistants, or designees perform such procedures as are, in their judgment, necessary and desirable.    3.   My surgeon/physician has discussed prior to my surgery the potential benefits, risks and side effects of this procedure; the likelihood of achieving goals; and potential problems that might occur during recuperation.  They also discussed reasonable alternatives to the procedure, including risks, benefits, and side effects related to the alternatives and risks related to not receiving this procedure.  I have had all my questions answered and I acknowledge that no guarantee has been made as to the result that may be obtained.    4.   Should the need arise during my operation/procedure, which includes change of level of care prior to discharge, I also consent to the administration of blood and/or blood products.  Further, I understand that despite careful testing and screening of blood or blood products by collecting agencies, I may still be subject to ill effects as a result of receiving a blood transfusion and/or blood products.  The following are some, but not all, of the potential risks that can  occur: fever and allergic reactions, hemolytic reactions, transmission of diseases such as Hepatitis, AIDS and Cytomegalovirus (CMV) and fluid overload.  In the event that I wish to have an autologous transfusion of my own blood, or a directed donor transfusion, I will discuss this with my physician.    Check only if Refusing Blood or Blood Products  I understand refusal of blood or blood products as deemed necessary by my physician may have serious consequences to my condition to include possible death. I hereby assume responsibility for my refusal and release the hospital, its personnel, and my physicians from any responsibility for the consequences of my refusal.          o  Refuse         5.   I authorize the use of any specimen, organs, tissues, body parts or foreign objects that may be removed from my body during the operation/procedure for diagnosis, research or teaching purposes and their subsequent disposal by hospital authorities.  I also authorize the release of specimen test results and/or written reports to my treating physician on the hospital medical staff or other referring or consulting physicians involved in my care, at the discretion of the Pathologist or my treating physician.    6.   I consent to the photographing or videotaping of the operations or procedures to be performed, including appropriate portions of my body for medical, scientific, or educational purposes, provided my identity is not revealed by the pictures or by descriptive texts accompanying them.  If the procedure has been photographed/videotaped, the surgeon will obtain the original picture, image, videotape or CD.  The hospital will not be responsible for storage, release or maintenance of the picture, image, tape or CD.    7.   I consent to the presence of a  or observers in the operating room as deemed necessary by my physician or their designees.    8.   I recognize that in the event my procedure results in  extended X-Ray/fluoroscopy time, I may develop a skin reaction.    9. If I have a Do Not Attempt Resuscitation (DNAR) order in place, that status will be suspended while in the operating room, procedural suite, and during the recovery period unless otherwise explicitly stated by me (or a person authorized to consent on my behalf). The surgeon or my attending physician will determine when the applicable recovery period ends for purposes of reinstating the DNAR order.  10. Patients having a sterilization procedure: I understand that if the procedure is successful the results will be permanent and it will therefore be impossible for me to inseminate, conceive, or bear children.  I also understand that the procedure is intended to result in sterility, although the result has not been guaranteed.   11. I acknowledge that my physician has explained sedation/analgesia administration to me including the risk and benefits I consent to the administration of sedation/analgesia as may be necessary or desirable in the judgment of my physician.    I CERTIFY THAT I HAVE READ AND FULLY UNDERSTAND THE ABOVE CONSENT TO OPERATION and/or OTHER PROCEDURE.     ____________________________________       _________________________________      ______________________________  Signature of Patient         Signature of Responsible Person        Printed Name of Responsible Person    ____________________________________      _________________________________      ______________________________       Signature of Witness          Relationship to Patient                       Date                                       Time  Patient Name: Greg Uriarte  : 1985    Reviewed: 2024   Printed: August 10, 2024  Medical Record #: [id number] Page 1 of 1

## (undated) NOTE — ED AVS SNAPSHOT
Manoj Kirby   MRN: [id number]    Department:  [facility] Emergency Department   Date of Visit:  3/8/2020           Disclosure     Insurance plans vary and the physician(s) referred by the ER may not be covered by your plan.  Please contact your i tell this physician (or your personal doctor if your instructions are to return to your personal doctor) about any new or lasting problems. The primary care or specialist physician will see patients referred from the [facility] Emergency Department.  Warren Fontanez

## (undated) NOTE — LETTER
August 23, 2024  To Whom It May Concern,  Please let this letter certify that RADHIKA MANDEL   was admitted to [facility] from 8/9/24-8/23/24 for an acute medical issue.  She may return to:        [x]  Work - on 9/10/2024     []   School    []   P.E. Class   []  Sports     []  Without restrictions     [x]   With the following restrictions:  per neurology      Additional Comments:     Any restrictions per Dr. Collins of neurology.     Please feel free to contact me at [phone] with any questions.   Sincerely,    Nik Jordan DO  [facility]

## (undated) NOTE — LETTER
[address]  [address]  Consent for Procedure/Sedation  Date: ***         Time: ***    {[facility] ivs consent:7296}

## (undated) NOTE — LETTER
[address]  [address]  Consent for Procedure/Sedation  Date: 8/10/2024         Time: 1700    I hereby authorize Dr. Collins, my physician and his/her assistants (if applicable), which may include medical students, residents, and/or fellows, to perform the following surgical operation/ procedure and administer such anesthesia as may be determined necessary by my physician:  Operation/Procedure name (s) Cerebral Angiogram with possible intervention on Greg Uriarte  2.   I recognize that during the surgical operation/procedure, unforeseen conditions may necessitate additional or different procedures than those listed above.  I, therefore, further authorize and request that the above-named surgeon, assistants, or designees perform such procedures as are, in their judgment, necessary and desirable.    3.   My surgeon/physician has discussed prior to my surgery the potential benefits, risks and side effects of this procedure; the likelihood of achieving goals; and potential problems that might occur during recuperation.  They also discussed reasonable alternatives to the procedure, including risks, benefits, and side effects related to the alternatives and risks related to not receiving this procedure.  I have had all my questions answered and I acknowledge that no guarantee has been made as to the result that may be obtained.    4.   Should the need arise during my operation/procedure, which includes change of level of care prior to discharge, I also consent to the administration of blood and/or blood products.  Further, I understand that despite careful testing and screening of blood or blood products by collecting agencies, I may still be subject to ill effects as a result of receiving a blood transfusion and/or blood products.  The following are some, but not all, of the potential risks that can occur: fever and allergic reactions, hemolytic reactions, transmission of diseases  such as Hepatitis, AIDS and Cytomegalovirus (CMV) and fluid overload.  In the event that I wish to have an autologous transfusion of my own blood, or a directed donor transfusion, I will discuss this with my physician.     Check only if Refusing Blood or Blood Products  I understand refusal of blood or blood products as deemed necessary by my physician may have serious consequences to my condition to include possible death. I hereby assume responsibility for my refusal and release the hospital, its personnel, and my physicians from any responsibility for the consequences of my refusal.      o  Refuse        5.   I authorize the use of any specimen, organs, tissues, body parts or foreign objects that may be removed from my body during the operation/procedure for diagnosis, research or teaching purposes and their subsequent disposal by hospital authorities.  I also authorize the release of specimen test results and/or written reports to my treating physician on the hospital medical staff or other referring or consulting physicians involved in my care, at the discretion of the Pathologist or my treating physician.    6.   I consent to the photographing or videotaping of the operations or procedures to be performed, including appropriate portions of my body for medical, scientific, or educational purposes, provided my identity is not revealed by the pictures or by descriptive texts accompanying them.  If the procedure has been photographed/videotaped, the surgeon will obtain the original picture, image, videotape or CD.  The hospital will not be responsible for storage, release or maintenance of the picture, image, tape or CD.    7.   I consent to the presence of a  or observers in the operating room as deemed necessary by my physician or their designees.    8.   I recognize that in the event my procedure results in extended X-Ray/fluoroscopy time, I may develop a skin reaction.    9. If I have a Do  Not Attempt Resuscitation (DNAR) order in place, that status will be suspended while in the operating room, procedural suite, and during the recovery period unless otherwise explicitly stated by me (or a person authorized to consent on my behalf). The surgeon or my attending physician will determine when the applicable recovery period ends for purposes of reinstating the DNAR order.  10. Patients having a sterilization procedure: I understand that if the procedure is successful the results will be permanent and it will therefore be impossible for me to inseminate, conceive, or bear children.  I also understand that the procedure is intended to result in sterility, although the result has not been guaranteed.   11. I acknowledge that my physician has explained sedation/analgesia administration to me including the risk and benefits I consent to the administration of sedation/analgesia as may be necessary or desirable in the judgment of my physician.    I CERTIFY THAT I HAVE READ AND FULLY UNDERSTAND THE ABOVE CONSENT TO OPERATION and/or OTHER PROCEDURE.     ____________________________________       _________________________________      ______________________________  Signature of Patient         Signature of Responsible Person        Printed Name of Responsible Person    ____________________________________      _________________________________      ______________________________       Signature of Witness          Relationship to Patient                       Date                                         Time  Patient Name: Greg ROBERTS Chapito  : 1985    Reviewed: 2024   Printed: August 10, 2024  Medical Record #: [id number] Page 1 of 1

## (undated) NOTE — LETTER
[address]  [address]    Consent for Anesthesia    IGreg agree to be cared for by a physician anesthesiologist alone and/or with a nurse anesthetist, who is specially trained to monitor me and give me medicine to put me to sleep or keep me comfortable during my procedure    I understand that my anesthesiologist and/or anesthetist is not an employee or agent of [facility] or [employer]. He or she works for [employer].    As the patient asking for anesthesia services, I agree to:  Allow the anesthesiologist (anesthesia doctor) to give me medicine and do additional procedures as necessary. Some examples are: Starting or using an “IV” to give me medicine, fluids or blood during my procedure, and having a breathing tube placed to help me breathe when I’m asleep (intubation). In the event that my heart stops working properly, I understand that my anesthesiologist will make every effort to sustain my life, unless otherwise directed by [facility] Do Not Resuscitate documents.  Tell my anesthesia doctor before my procedure:   If I am pregnant.   The last time that I ate or drank.  iii. All of the medicines I take (including prescriptions, herbal supplements, and pills I can buy without a prescription (including street drugs/illegal medications). Failure to inform my anesthesiologist about these medicines may increase my risk of anesthetic complications.  Iv.If I am allergic to anything or have had a reaction to anesthesia before.  I understand how the anesthesia medicine will help me (benefits).  I understand that with any type of anesthesia medicine there are risks:  The most common risks are: nausea, vomiting, sore throat, muscle soreness, damage to my eyes, mouth, or teeth (from breathing tube placement).  Rare risks include: remembering what happened during my procedure, allergic reactions to medications, injury to my airway,  heart, lungs, vision, nerves, or muscles and in extremely rare instances death.  My doctor has explained to me other choices available to me for my care (alternatives).  Pregnant Patients (“epidural”):  I understand that the risks of having an epidural (medicine given into my back to help control pain during labor), include itching, low blood pressure, difficulty urinating, headache or slowing of the baby’s heart. Very rare risks include infection, bleeding, seizure, irregular heart rhythms and nerve injury.  Regional Anesthesia (“spinal”, “epidural”, & “nerve blocks”):  I understand that rare but potential complications include headache, bleeding, infection, seizure, irregular heart rhythms, and nerve injury.    _____________________________________________________________________________  Patient (or Representative) Signature/Relationship to Patient  Date   Time    _____________________________________________________________________________   Name (if used)    Language/Organization   Time    _____________________________________________________________________________  Nurse Anesthetist Signature     Date   Time    ______________________________________________________________________________  Anesthesiologist Signature     Date   Time  I have discussed the procedure and information above with the patient (or patient’s representative) and answered their questions. The patient or their representative has agreed to have anesthesia services.    _____________________________________________________________________________  Witness       Date   Time  I have verified that the signature is that of the patient or patient’s representative, and that it was signed before the procedure    Patient Name: Greg Uriarte     : 1985                 Printed: 8/10/2024 at 4:48 PM    Medical Record #: [id number]                                            Page 1 of 1

## (undated) NOTE — LETTER
Date: 10/14/2024    Patient Name: Greg Uriarte      To Whom it may concern:    This letter is regarding Greg Uriarte ( 1985), who is under my care.  I recommend she remain completely off from work, for medical reasons, until 2024.    Sincerely,        Sweta Hayes MD, FAES, FAAN  Board-Certified in Neurology, Epilepsy, and Clinical Neurophysiology  [facility]